# Patient Record
Sex: FEMALE | Race: WHITE | NOT HISPANIC OR LATINO | Employment: OTHER | URBAN - METROPOLITAN AREA
[De-identification: names, ages, dates, MRNs, and addresses within clinical notes are randomized per-mention and may not be internally consistent; named-entity substitution may affect disease eponyms.]

---

## 2018-02-05 ENCOUNTER — CONVERSION ENCOUNTER (OUTPATIENT)
Dept: MAMMOGRAPHY | Facility: HOSPITAL | Age: 63
End: 2018-02-05

## 2019-01-17 ENCOUNTER — OFFICE VISIT CONVERTED (OUTPATIENT)
Dept: FAMILY MEDICINE CLINIC | Facility: CLINIC | Age: 64
End: 2019-01-17
Attending: NURSE PRACTITIONER

## 2019-02-20 ENCOUNTER — HOSPITAL ENCOUNTER (OUTPATIENT)
Dept: GASTROENTEROLOGY | Facility: HOSPITAL | Age: 64
Setting detail: HOSPITAL OUTPATIENT SURGERY
Discharge: HOME | End: 2019-02-20
Attending: INTERNAL MEDICINE

## 2019-07-17 ENCOUNTER — OFFICE VISIT CONVERTED (OUTPATIENT)
Dept: FAMILY MEDICINE CLINIC | Facility: CLINIC | Age: 64
End: 2019-07-17
Attending: NURSE PRACTITIONER

## 2019-10-17 ENCOUNTER — OFFICE VISIT CONVERTED (OUTPATIENT)
Dept: FAMILY MEDICINE CLINIC | Facility: CLINIC | Age: 64
End: 2019-10-17
Attending: NURSE PRACTITIONER

## 2019-10-17 ENCOUNTER — HOSPITAL ENCOUNTER (OUTPATIENT)
Dept: FAMILY MEDICINE CLINIC | Facility: CLINIC | Age: 64
Discharge: HOME | End: 2019-10-17
Attending: NURSE PRACTITIONER

## 2019-10-17 LAB
FOLATE SERPL-MCNC: >20 NG/ML (ref 4.8–20)
IRON SATN MFR SERPL: 17 % (ref 20–55)
IRON SERPL-MCNC: 68 UG/DL (ref 60–170)
TIBC SERPL-MCNC: 396 UG/DL (ref 245–450)
TRANSFERRIN SERPL-MCNC: 277 MG/DL (ref 250–380)
VIT B12 SERPL-MCNC: 419 PG/ML (ref 211–911)

## 2019-12-13 ENCOUNTER — OFFICE VISIT CONVERTED (OUTPATIENT)
Dept: FAMILY MEDICINE CLINIC | Facility: CLINIC | Age: 64
End: 2019-12-13
Attending: NURSE PRACTITIONER

## 2019-12-16 ENCOUNTER — HOSPITAL ENCOUNTER (OUTPATIENT)
Dept: GENERAL RADIOLOGY | Facility: HOSPITAL | Age: 64
Discharge: HOME | End: 2019-12-16
Attending: NURSE PRACTITIONER

## 2019-12-27 ENCOUNTER — OFFICE VISIT CONVERTED (OUTPATIENT)
Dept: ORTHOPEDIC SURGERY | Facility: CLINIC | Age: 64
End: 2019-12-27
Attending: ORTHOPAEDIC SURGERY

## 2019-12-31 ENCOUNTER — HOSPITAL ENCOUNTER (OUTPATIENT)
Dept: MRI IMAGING | Facility: HOSPITAL | Age: 64
Discharge: HOME | End: 2019-12-31
Attending: ORTHOPAEDIC SURGERY

## 2020-01-09 ENCOUNTER — CONVERSION ENCOUNTER (OUTPATIENT)
Dept: ORTHOPEDIC SURGERY | Facility: CLINIC | Age: 65
End: 2020-01-09

## 2020-01-09 ENCOUNTER — OFFICE VISIT CONVERTED (OUTPATIENT)
Dept: ORTHOPEDIC SURGERY | Facility: CLINIC | Age: 65
End: 2020-01-09
Attending: ORTHOPAEDIC SURGERY

## 2020-01-15 ENCOUNTER — OFFICE VISIT CONVERTED (OUTPATIENT)
Dept: FAMILY MEDICINE CLINIC | Facility: CLINIC | Age: 65
End: 2020-01-15
Attending: NURSE PRACTITIONER

## 2020-02-11 ENCOUNTER — CONVERSION ENCOUNTER (OUTPATIENT)
Dept: GASTROENTEROLOGY | Facility: CLINIC | Age: 65
End: 2020-02-11

## 2020-02-11 ENCOUNTER — OFFICE VISIT CONVERTED (OUTPATIENT)
Dept: GASTROENTEROLOGY | Facility: CLINIC | Age: 65
End: 2020-02-11
Attending: NURSE PRACTITIONER

## 2020-03-13 ENCOUNTER — HOSPITAL ENCOUNTER (OUTPATIENT)
Dept: GASTROENTEROLOGY | Facility: HOSPITAL | Age: 65
Setting detail: HOSPITAL OUTPATIENT SURGERY
Discharge: HOME | End: 2020-03-13
Attending: INTERNAL MEDICINE

## 2020-04-10 ENCOUNTER — OFFICE VISIT CONVERTED (OUTPATIENT)
Dept: ORTHOPEDIC SURGERY | Facility: CLINIC | Age: 65
End: 2020-04-10
Attending: ORTHOPAEDIC SURGERY

## 2020-04-15 ENCOUNTER — TELEMEDICINE CONVERTED (OUTPATIENT)
Dept: FAMILY MEDICINE CLINIC | Facility: CLINIC | Age: 65
End: 2020-04-15
Attending: NURSE PRACTITIONER

## 2020-05-07 ENCOUNTER — TELEMEDICINE CONVERTED (OUTPATIENT)
Dept: GASTROENTEROLOGY | Facility: CLINIC | Age: 65
End: 2020-05-07
Attending: NURSE PRACTITIONER

## 2020-05-19 ENCOUNTER — HOSPITAL ENCOUNTER (OUTPATIENT)
Dept: LAB | Facility: HOSPITAL | Age: 65
Discharge: HOME | End: 2020-05-19
Attending: NURSE PRACTITIONER

## 2020-05-19 LAB
25(OH)D3 SERPL-MCNC: 31.1 NG/ML (ref 30–100)
ALBUMIN SERPL-MCNC: 4 G/DL (ref 3.5–5)
ALBUMIN/GLOB SERPL: 1 {RATIO} (ref 1.4–2.6)
ALP SERPL-CCNC: 74 U/L (ref 43–160)
ALT SERPL-CCNC: 22 U/L (ref 10–40)
ANION GAP SERPL CALC-SCNC: 21 MMOL/L (ref 8–19)
AST SERPL-CCNC: 33 U/L (ref 15–50)
BASOPHILS # BLD AUTO: 0.03 10*3/UL (ref 0–0.2)
BASOPHILS NFR BLD AUTO: 0.6 % (ref 0–3)
BILIRUB SERPL-MCNC: 0.39 MG/DL (ref 0.2–1.3)
BUN SERPL-MCNC: 12 MG/DL (ref 5–25)
BUN/CREAT SERPL: 11 {RATIO} (ref 6–20)
CALCIUM SERPL-MCNC: 9.3 MG/DL (ref 8.7–10.4)
CHLORIDE SERPL-SCNC: 101 MMOL/L (ref 99–111)
CHOLEST SERPL-MCNC: 193 MG/DL (ref 107–200)
CHOLEST/HDLC SERPL: 2.5 {RATIO} (ref 3–6)
CONV ABS IMM GRAN: 0.01 10*3/UL (ref 0–0.2)
CONV CO2: 21 MMOL/L (ref 22–32)
CONV IMMATURE GRAN: 0.2 % (ref 0–1.8)
CONV TOTAL PROTEIN: 8 G/DL (ref 6.3–8.2)
CREAT UR-MCNC: 1.12 MG/DL (ref 0.5–0.9)
DEPRECATED RDW RBC AUTO: 47.5 FL (ref 36.4–46.3)
EOSINOPHIL # BLD AUTO: 0.05 10*3/UL (ref 0–0.7)
EOSINOPHIL # BLD AUTO: 1 % (ref 0–7)
ERYTHROCYTE [DISTWIDTH] IN BLOOD BY AUTOMATED COUNT: 14.5 % (ref 11.7–14.4)
FOLATE SERPL-MCNC: >20 NG/ML (ref 4.8–20)
GFR SERPLBLD BASED ON 1.73 SQ M-ARVRAT: 52 ML/MIN/{1.73_M2}
GLOBULIN UR ELPH-MCNC: 4 G/DL (ref 2–3.5)
GLUCOSE SERPL-MCNC: 102 MG/DL (ref 65–99)
HCT VFR BLD AUTO: 42 % (ref 37–47)
HDLC SERPL-MCNC: 77 MG/DL (ref 40–60)
HGB BLD-MCNC: 13.2 G/DL (ref 12–16)
IRON SATN MFR SERPL: 12 % (ref 20–55)
IRON SERPL-MCNC: 59 UG/DL (ref 60–170)
LDLC SERPL CALC-MCNC: 95 MG/DL (ref 70–100)
LYMPHOCYTES # BLD AUTO: 1.82 10*3/UL (ref 1–5)
LYMPHOCYTES NFR BLD AUTO: 35.2 % (ref 20–45)
MCH RBC QN AUTO: 27.8 PG (ref 27–31)
MCHC RBC AUTO-ENTMCNC: 31.4 G/DL (ref 33–37)
MCV RBC AUTO: 88.6 FL (ref 81–99)
MONOCYTES # BLD AUTO: 0.55 10*3/UL (ref 0.2–1.2)
MONOCYTES NFR BLD AUTO: 10.6 % (ref 3–10)
NEUTROPHILS # BLD AUTO: 2.71 10*3/UL (ref 2–8)
NEUTROPHILS NFR BLD AUTO: 52.4 % (ref 30–85)
NRBC CBCN: 0 % (ref 0–0.7)
OSMOLALITY SERPL CALC.SUM OF ELEC: 286 MOSM/KG (ref 273–304)
PLATELET # BLD AUTO: 332 10*3/UL (ref 130–400)
PMV BLD AUTO: 9.9 FL (ref 9.4–12.3)
POTASSIUM SERPL-SCNC: 4.7 MMOL/L (ref 3.5–5.3)
RBC # BLD AUTO: 4.74 10*6/UL (ref 4.2–5.4)
SODIUM SERPL-SCNC: 138 MMOL/L (ref 135–147)
TIBC SERPL-MCNC: 480 UG/DL (ref 245–450)
TRANSFERRIN SERPL-MCNC: 336 MG/DL (ref 250–380)
TRIGL SERPL-MCNC: 104 MG/DL (ref 40–150)
VIT B12 SERPL-MCNC: 458 PG/ML (ref 211–911)
VLDLC SERPL-MCNC: 21 MG/DL (ref 5–37)
WBC # BLD AUTO: 5.17 10*3/UL (ref 4.8–10.8)

## 2020-07-14 ENCOUNTER — OFFICE VISIT CONVERTED (OUTPATIENT)
Dept: ORTHOPEDIC SURGERY | Facility: CLINIC | Age: 65
End: 2020-07-14
Attending: ORTHOPAEDIC SURGERY

## 2020-07-15 ENCOUNTER — TELEMEDICINE CONVERTED (OUTPATIENT)
Dept: FAMILY MEDICINE CLINIC | Facility: CLINIC | Age: 65
End: 2020-07-15
Attending: NURSE PRACTITIONER

## 2020-09-18 ENCOUNTER — HOSPITAL ENCOUNTER (OUTPATIENT)
Dept: LAB | Facility: HOSPITAL | Age: 65
Discharge: HOME | End: 2020-09-18
Attending: NURSE PRACTITIONER

## 2020-09-19 LAB
25(OH)D3 SERPL-MCNC: 39.1 NG/ML (ref 30–100)
IRON SATN MFR SERPL: 17 % (ref 20–55)
IRON SERPL-MCNC: 82 UG/DL (ref 60–170)
TIBC SERPL-MCNC: 470 UG/DL (ref 245–450)
TRANSFERRIN SERPL-MCNC: 329 MG/DL (ref 250–380)

## 2020-09-22 LAB
CONV CELIAC DISEASE AB-IGA: 79 MG/DL (ref 68–408)
TTG IGA SER-ACNC: <2 U/ML (ref 0–3)

## 2020-10-05 ENCOUNTER — OFFICE VISIT CONVERTED (OUTPATIENT)
Dept: FAMILY MEDICINE CLINIC | Facility: CLINIC | Age: 65
End: 2020-10-05
Attending: NURSE PRACTITIONER

## 2020-10-06 ENCOUNTER — OFFICE VISIT CONVERTED (OUTPATIENT)
Dept: ORTHOPEDIC SURGERY | Facility: CLINIC | Age: 65
End: 2020-10-06
Attending: ORTHOPAEDIC SURGERY

## 2020-10-26 ENCOUNTER — HOSPITAL ENCOUNTER (OUTPATIENT)
Dept: PREADMISSION TESTING | Facility: HOSPITAL | Age: 65
Discharge: HOME | End: 2020-10-26
Attending: ORTHOPAEDIC SURGERY

## 2020-10-26 LAB
ALBUMIN SERPL-MCNC: 3.6 G/DL (ref 3.5–5)
ALBUMIN/GLOB SERPL: 0.8 {RATIO} (ref 1.4–2.6)
ALP SERPL-CCNC: 76 U/L (ref 43–160)
ALT SERPL-CCNC: 18 U/L (ref 10–40)
ANION GAP SERPL CALC-SCNC: 15 MMOL/L (ref 8–19)
APTT BLD: 22 S (ref 22.2–34.2)
AST SERPL-CCNC: 26 U/L (ref 15–50)
BASOPHILS # BLD AUTO: 0.07 10*3/UL (ref 0–0.2)
BASOPHILS NFR BLD AUTO: 1.1 % (ref 0–3)
BILIRUB SERPL-MCNC: 0.33 MG/DL (ref 0.2–1.3)
BUN SERPL-MCNC: 16 MG/DL (ref 5–25)
BUN/CREAT SERPL: 15 {RATIO} (ref 6–20)
CALCIUM SERPL-MCNC: 10.1 MG/DL (ref 8.7–10.4)
CHLORIDE SERPL-SCNC: 102 MMOL/L (ref 99–111)
CONV ABS IMM GRAN: 0.03 10*3/UL (ref 0–0.2)
CONV CO2: 22 MMOL/L (ref 22–32)
CONV IMMATURE GRAN: 0.5 % (ref 0–1.8)
CONV TOTAL PROTEIN: 7.9 G/DL (ref 6.3–8.2)
CREAT UR-MCNC: 1.04 MG/DL (ref 0.5–0.9)
DEPRECATED RDW RBC AUTO: 45.4 FL (ref 36.4–46.3)
EOSINOPHIL # BLD AUTO: 0.25 10*3/UL (ref 0–0.7)
EOSINOPHIL # BLD AUTO: 3.8 % (ref 0–7)
ERYTHROCYTE [DISTWIDTH] IN BLOOD BY AUTOMATED COUNT: 14.2 % (ref 11.7–14.4)
EST. AVERAGE GLUCOSE BLD GHB EST-MCNC: 108 MG/DL
GFR SERPLBLD BASED ON 1.73 SQ M-ARVRAT: 57 ML/MIN/{1.73_M2}
GLOBULIN UR ELPH-MCNC: 4.3 G/DL (ref 2–3.5)
GLUCOSE SERPL-MCNC: 90 MG/DL (ref 65–99)
HBA1C MFR BLD: 5.4 % (ref 3.5–5.7)
HCT VFR BLD AUTO: 38.8 % (ref 37–47)
HGB BLD-MCNC: 12.6 G/DL (ref 12–16)
INR PPP: 0.99 (ref 2–3)
LYMPHOCYTES # BLD AUTO: 1.79 10*3/UL (ref 1–5)
LYMPHOCYTES NFR BLD AUTO: 26.9 % (ref 20–45)
MCH RBC QN AUTO: 28.2 PG (ref 27–31)
MCHC RBC AUTO-ENTMCNC: 32.5 G/DL (ref 33–37)
MCV RBC AUTO: 86.8 FL (ref 81–99)
MONOCYTES # BLD AUTO: 0.66 10*3/UL (ref 0.2–1.2)
MONOCYTES NFR BLD AUTO: 9.9 % (ref 3–10)
NEUTROPHILS # BLD AUTO: 3.85 10*3/UL (ref 2–8)
NEUTROPHILS NFR BLD AUTO: 57.8 % (ref 30–85)
NRBC CBCN: 0 % (ref 0–0.7)
OSMOLALITY SERPL CALC.SUM OF ELEC: 279 MOSM/KG (ref 273–304)
PLATELET # BLD AUTO: 284 10*3/UL (ref 130–400)
PMV BLD AUTO: 9.1 FL (ref 9.4–12.3)
POTASSIUM SERPL-SCNC: 4.7 MMOL/L (ref 3.5–5.3)
PROTHROMBIN TIME: 10.7 S (ref 9.4–12)
RBC # BLD AUTO: 4.47 10*6/UL (ref 4.2–5.4)
SODIUM SERPL-SCNC: 134 MMOL/L (ref 135–147)
WBC # BLD AUTO: 6.65 10*3/UL (ref 4.8–10.8)

## 2020-10-30 ENCOUNTER — HOSPITAL ENCOUNTER (OUTPATIENT)
Dept: PREADMISSION TESTING | Facility: HOSPITAL | Age: 65
Discharge: HOME | End: 2020-10-30
Attending: ORTHOPAEDIC SURGERY

## 2020-11-01 LAB — SARS-COV-2 RNA SPEC QL NAA+PROBE: NOT DETECTED

## 2020-11-11 ENCOUNTER — CONVERSION ENCOUNTER (OUTPATIENT)
Dept: FAMILY MEDICINE CLINIC | Facility: CLINIC | Age: 65
End: 2020-11-11

## 2020-11-11 ENCOUNTER — OFFICE VISIT CONVERTED (OUTPATIENT)
Dept: FAMILY MEDICINE CLINIC | Facility: CLINIC | Age: 65
End: 2020-11-11
Attending: NURSE PRACTITIONER

## 2020-11-19 ENCOUNTER — CONVERSION ENCOUNTER (OUTPATIENT)
Dept: ORTHOPEDIC SURGERY | Facility: CLINIC | Age: 65
End: 2020-11-19

## 2020-11-19 ENCOUNTER — OFFICE VISIT CONVERTED (OUTPATIENT)
Dept: ORTHOPEDIC SURGERY | Facility: CLINIC | Age: 65
End: 2020-11-19
Attending: ORTHOPAEDIC SURGERY

## 2020-12-21 ENCOUNTER — OFFICE VISIT CONVERTED (OUTPATIENT)
Dept: ORTHOPEDIC SURGERY | Facility: CLINIC | Age: 65
End: 2020-12-21
Attending: PHYSICIAN ASSISTANT

## 2021-01-04 ENCOUNTER — OFFICE VISIT CONVERTED (OUTPATIENT)
Dept: FAMILY MEDICINE CLINIC | Facility: CLINIC | Age: 66
End: 2021-01-04
Attending: NURSE PRACTITIONER

## 2021-02-01 ENCOUNTER — OFFICE VISIT CONVERTED (OUTPATIENT)
Dept: ORTHOPEDIC SURGERY | Facility: CLINIC | Age: 66
End: 2021-02-01
Attending: PHYSICIAN ASSISTANT

## 2021-03-10 ENCOUNTER — HOSPITAL ENCOUNTER (OUTPATIENT)
Dept: VACCINE CLINIC | Facility: HOSPITAL | Age: 66
Discharge: HOME | End: 2021-03-10
Attending: INTERNAL MEDICINE

## 2021-03-15 ENCOUNTER — HOSPITAL ENCOUNTER (OUTPATIENT)
Dept: LAB | Facility: HOSPITAL | Age: 66
Discharge: HOME | End: 2021-03-15
Attending: NURSE PRACTITIONER

## 2021-03-15 LAB
ALBUMIN SERPL-MCNC: 3.6 G/DL (ref 3.5–5)
ALBUMIN/GLOB SERPL: 0.8 {RATIO} (ref 1.4–2.6)
ALP SERPL-CCNC: 70 U/L (ref 43–160)
ALT SERPL-CCNC: 15 U/L (ref 10–40)
ANION GAP SERPL CALC-SCNC: 12 MMOL/L (ref 8–19)
AST SERPL-CCNC: 25 U/L (ref 15–50)
BASOPHILS # BLD AUTO: 0.05 10*3/UL (ref 0–0.2)
BASOPHILS NFR BLD AUTO: 0.9 % (ref 0–3)
BILIRUB SERPL-MCNC: 0.42 MG/DL (ref 0.2–1.3)
BUN SERPL-MCNC: 15 MG/DL (ref 5–25)
BUN/CREAT SERPL: 17 {RATIO} (ref 6–20)
CALCIUM SERPL-MCNC: 9.1 MG/DL (ref 8.7–10.4)
CHLORIDE SERPL-SCNC: 101 MMOL/L (ref 99–111)
CHOLEST SERPL-MCNC: 167 MG/DL (ref 107–200)
CHOLEST/HDLC SERPL: 2.7 {RATIO} (ref 3–6)
CONV ABS IMM GRAN: 0.01 10*3/UL (ref 0–0.2)
CONV CO2: 25 MMOL/L (ref 22–32)
CONV IMMATURE GRAN: 0.2 % (ref 0–1.8)
CONV TOTAL PROTEIN: 7.9 G/DL (ref 6.3–8.2)
CREAT UR-MCNC: 0.9 MG/DL (ref 0.5–0.9)
DEPRECATED RDW RBC AUTO: 51.3 FL (ref 36.4–46.3)
EOSINOPHIL # BLD AUTO: 0.14 10*3/UL (ref 0–0.7)
EOSINOPHIL # BLD AUTO: 2.4 % (ref 0–7)
ERYTHROCYTE [DISTWIDTH] IN BLOOD BY AUTOMATED COUNT: 16.1 % (ref 11.7–14.4)
FOLATE SERPL-MCNC: >20 NG/ML (ref 4.8–20)
GFR SERPLBLD BASED ON 1.73 SQ M-ARVRAT: >60 ML/MIN/{1.73_M2}
GLOBULIN UR ELPH-MCNC: 4.3 G/DL (ref 2–3.5)
GLUCOSE SERPL-MCNC: 86 MG/DL (ref 65–99)
HCT VFR BLD AUTO: 38.7 % (ref 37–47)
HDLC SERPL-MCNC: 63 MG/DL (ref 40–60)
HGB BLD-MCNC: 11.9 G/DL (ref 12–16)
IRON SATN MFR SERPL: 20 % (ref 20–55)
IRON SERPL-MCNC: 80 UG/DL (ref 60–170)
LDLC SERPL CALC-MCNC: 82 MG/DL (ref 70–100)
LYMPHOCYTES # BLD AUTO: 2.77 10*3/UL (ref 1–5)
LYMPHOCYTES NFR BLD AUTO: 47.4 % (ref 20–45)
MCH RBC QN AUTO: 26.6 PG (ref 27–31)
MCHC RBC AUTO-ENTMCNC: 30.7 G/DL (ref 33–37)
MCV RBC AUTO: 86.6 FL (ref 81–99)
MONOCYTES # BLD AUTO: 0.45 10*3/UL (ref 0.2–1.2)
MONOCYTES NFR BLD AUTO: 7.7 % (ref 3–10)
NEUTROPHILS # BLD AUTO: 2.42 10*3/UL (ref 2–8)
NEUTROPHILS NFR BLD AUTO: 41.4 % (ref 30–85)
NRBC CBCN: 0 % (ref 0–0.7)
OSMOLALITY SERPL CALC.SUM OF ELEC: 278 MOSM/KG (ref 273–304)
PLATELET # BLD AUTO: 316 10*3/UL (ref 130–400)
PMV BLD AUTO: 10.4 FL (ref 9.4–12.3)
POTASSIUM SERPL-SCNC: 4.2 MMOL/L (ref 3.5–5.3)
RBC # BLD AUTO: 4.47 10*6/UL (ref 4.2–5.4)
SODIUM SERPL-SCNC: 134 MMOL/L (ref 135–147)
TIBC SERPL-MCNC: 406 UG/DL (ref 245–450)
TRANSFERRIN SERPL-MCNC: 284 MG/DL (ref 250–380)
TRIGL SERPL-MCNC: 108 MG/DL (ref 40–150)
TSH SERPL-ACNC: 3.42 M[IU]/L (ref 0.27–4.2)
VIT B12 SERPL-MCNC: >2000 PG/ML (ref 211–911)
VLDLC SERPL-MCNC: 22 MG/DL (ref 5–37)
WBC # BLD AUTO: 5.84 10*3/UL (ref 4.8–10.8)

## 2021-03-31 ENCOUNTER — OFFICE VISIT CONVERTED (OUTPATIENT)
Dept: FAMILY MEDICINE CLINIC | Facility: CLINIC | Age: 66
End: 2021-03-31
Attending: NURSE PRACTITIONER

## 2021-03-31 ENCOUNTER — HOSPITAL ENCOUNTER (OUTPATIENT)
Dept: VACCINE CLINIC | Facility: HOSPITAL | Age: 66
Discharge: HOME | End: 2021-03-31
Attending: INTERNAL MEDICINE

## 2021-05-03 ENCOUNTER — CONVERSION ENCOUNTER (OUTPATIENT)
Dept: OTHER | Facility: HOSPITAL | Age: 66
End: 2021-05-03

## 2021-05-03 ENCOUNTER — OFFICE VISIT CONVERTED (OUTPATIENT)
Dept: ORTHOPEDIC SURGERY | Facility: CLINIC | Age: 66
End: 2021-05-03
Attending: PHYSICIAN ASSISTANT

## 2021-05-12 NOTE — PROGRESS NOTES
Progress Note      Patient Name: Jeimy Willard   Patient ID: [id number]   Sex: Female   YOB: 1955    Primary Care Provider: Jeaneth HOLLINS   Referring Provider: Jeaneth HOLLINS    Visit Date: April 10, 2020    Provider: Chuck Valdez MD   Location: [facility]   Location Address: [address] [place] [address]  [id number]   Location Phone: [phone]          Chief Complaint  · Left knee pain      History Of Present Illness  Jeimy Willard is a 65 year old /White female who presents today to [facility]. She presents today for evaluation of left knee pain. She had a previous steroid injection about 3 months ago. The injection helped her significantly about 2 months. However, last month she did notice increasing pain to the knee. No new injury or symptoms. She is requesting a Zilretta today. She had a trip planned out west in 3 weeks that has been cancelled.       Past Medical History  Anemia; Arthritis; Bone Density Screening; Cancer; GERD (gastroesophageal reflux disease); Hypertension; Insomnia; Lymphoma, Malignant; Osteoporosis; Pap smear for cervical cancer screening; Screening Mammogram; Seasonal allergies; Stomach Disorder; Stomach Neoplasm, Malignant; Ulcer; Vitamin D deficiency         Past Surgical History  Cholecystecomy; Colonoscopy; EGD; Gallbladder; Gastrectomy, laparoscopic         Medication List  acetaminophen 325 mg oral tablet; Ambien 5 mg oral tablet; celecoxib 200 mg oral capsule; cyanocobalamin (vitamin B-12) 1,000 mcg/mL injection solution; ferrous sulfate 325 mg (65 mg iron) oral tablet; folic acid 1 mg oral tablet; lisinopril 10 mg oral tablet; loratadine 10 mg oral tablet; Miracle Cream 0; olopatadine 0.1 % ophthalmic (eye) drops; pantoprazole 40 mg oral tablet,delayed release (DR/EC); Premarin 0.625 mg/gram vaginal cream; PreserVision AREDS 7,160-113-100 unit-mg-unit oral tablet; Tums oral; Vitamin B-6 oral; Vitamin D3 1,000 unit oral  "tablet; Zantac 150 mg oral tablet         Allergy List  NO KNOWN DRUG ALLERGIES         Family Medical History  Breast Neoplasm, Malignant; Lung Neoplasm, Malignant; Stroke; Heart Disease; Hypertension; Cancer, Unspecified; Diabetes, unspecified type; Alzheimer's Disease; - No Family History of Colorectal Cancer; Renal failure; Diabetes; Esophagus Neoplasm, Malignant; Family history of Arthritis         Social History  Alcohol (Never); Alcohol Use (Never); lives with spouse; .; Recreational Drug Use (Never); Retired.; Tobacco (Never)         Review of Systems  · Constitutional  o Denies  o : fever, chills, weight loss  · Cardiovascular  o Denies  o : chest pain, shortness of breath  · Gastrointestinal  o Denies  o : liver disease, heartburn, nausea, blood in stools  · Genitourinary  o Denies  o : painful urination, blood in urine  · Integument  o Denies  o : rash, itching  · Neurologic  o Denies  o : headache, weakness, loss of consciousness  · Musculoskeletal  o Denies  o : painful, swollen joints  · Psychiatric  o Denies  o : drug/alcohol addiction, anxiety, depression      Vitals  Date Time BP Position Site L\R Cuff Size HR RR TEMP (F) WT  HT  BMI kg/m2 BSA m2 O2 Sat        04/10/2020 08:28 AM         205lbs 16oz 5'  3\" 36.49 2.04           Physical Examination  · Constitutional  o Appearance  o : well developed, well-nourished, no obvious deformities present  · Head and Face  o Head  o :   § Inspection  § : normocephalic  o Face  o :   § Inspection  § : no facial lesions  · Eyes  o Conjunctivae  o : conjunctivae normal  o Sclerae  o : sclerae white  · Ears, Nose, Mouth and Throat  o Ears  o :   § External Ears  § : appearance within normal limits  § Hearing  § : intact  o Nose  o :   § External Nose  § : appearance normal  · Neck  o Inspection/Palpation  o : normal appearance  o Range of Motion  o : full range of motion  · Respiratory  o Respiratory Effort  o : breathing unlabored  o Inspection of " Chest  o : normal appearance  o Auscultation of Lungs  o : no audible wheezing or rales  · Cardiovascular  o Heart  o : regular rate  · Gastrointestinal  o Abdominal Examination  o : soft and non-tender  · Skin and Subcutaneous Tissue  o General Inspection  o : intact, no rashes  · Psychiatric  o General  o : Alert and oriented x3  o Judgement and Insight  o : judgment and insight intact  o Mood and Affect  o : mood normal, affect appropriate  · Left Knee  o Inspection  o : Positive crepitus, sensation intact, Neurovascularly intact, flexion 110 degrees to full extension, skin intact, no skin discoloration, stable to varus and valgus stress, stable to anterior and posterior drawer, negative Alejandra, mild medial joint line tenderness, tender lateral joint line, Patella is stable, pain with patella compression  · Injection Note/Aspiration Note  o Site  o : left knee  o Procedure  o : Procedure: After educating the patient, patient gave consent for procedure. After using Chloraprep, the joint space was injected. The patient tolerated the procedure well.  o Medication  o : 32mg of SAMPLE Zilretta with 5ccs of 1% Lidocaine          Assessment  · Primary osteoarthritis of left knee     715.16/M17.12  · Left knee pain, unspecified chronicity     719.46/M25.562      Plan  · Orders  o Zilretta- 1 mg. () () - 715.16/M17.12 - 04/10/2020   Lot [id number] Exp 11 2020 Flexion Administered by CELI PIPER MD  o Knee Intra-articular Injection without US [facility] (33875) - 715.16/M17.12 - 04/10/2020   Lot [id number] Exp 07 01 2021 Hospira Administered by CELI PIPER MD  · Medications  o Medications have been Reconciled  o Transition of Care or Provider Policy  · Instructions  o Reviewed the patient's Past Medical, Social, and Family history as well as the ROS at today's visit, no changes.  o Call or return if worsening symptoms.  o This note is transcribed by Radha hernández  o Discussed treatment option with  the patient. She wishes to pursue left knee Zilretta injection today. Risks and benefits were discussed. She tolerated the injection well. Follow-up in 3 months to recheck.             Electronically Signed by: Radha Wilcox, -Author on April 13, 2020 12:02:55 PM  Electronically Co-signed by: Chuck Valdez MD -Reviewer on April 13, 2020 08:48:25 PM

## 2021-05-12 NOTE — PROGRESS NOTES
Progress Note      Patient Name: Jeimy Willard   Patient ID: [id number]   Sex: Female   YOB: 1955    Primary Care Provider: Jeaneth HOLLINS   Referring Provider: Jeaneth HOLLINS    Visit Date: April 15, 2020    Provider: GUNJAN Malloy   Location: [facility]   Location Address: [address] [place] [address]  [id number]   Location Phone: [phone]          Chief Complaint  · 3 Month Follow up for HTN, Insomnia, GERD, Anemia, and Vitamin D Deficiency      History Of Present Illness  Video Conferencing Visit  Jeimy Willard is a 65 year old /White female who is presenting for evaluation via video conferencing. Verbal consent obtained before beginning visit.   The following staff were present during this visit: Sofiya Nash CMA   Jeimy Willard is a 65 year old /White female who presents for evaluation and treatment of:      3 Month Follow up for HTN, Insomnia, GERD, Anemia, and Vitamin D Deficiency  Last lab work done 7/2019  Med Refills needed of ambien and acetaminophen.    Pt reported she took ferrous sulfate for a few days but caused nausea so stopped taking.    Also needing new referral to /[facility] for injections in her Lt Knee arthritis. Last injection on Monday.    uds 1.20. Insomnia - pt is sleeping 7-8 hours and awakens rested. denies any med se.          flu shot- 9/2019  Pap-10/2018  Dexa- 2/2018  Mammo- 2019 [facility]  Colon- 2/2019       Past Medical History  Disease Name Date Onset Notes   Anemia --  --    Arthritis --  --    Bone Density Screening 2/5/18 --    Cancer --  --    GERD (gastroesophageal reflux disease) --  --    Hypertension --  --    Insomnia --  --    Lymphoma, Malignant 1999 --    Osteoporosis --  --    Pap smear for cervical cancer screening 10/2018 --    Screening Mammogram 2019 [facility]   Seasonal allergies --  --    Stomach Disorder --  --    Stomach Neoplasm, Malignant 2005 --    Ulcer --  --     Vitamin D deficiency --  --          Past Surgical History  Procedure Name Date Notes   Cholecystecomy --  --    Colonoscopy 2/20/19 02/20/2019 - Polyp (6mm) in ascending colon, Polyp (2mm) in sigmoid colon - Polypectomy, Mild diverticulosis of sigmoid colon, Grade 1 internal hemorrhoids.    EGD 03/13/2020 Normal mucosa in the whole esophagus, Nodular tissue visualized at the esophago-jejunal anastomosis, Afferent and efferent small bowel limbs visualized. Patchy erythema and single erosion visualized in one jejunal limb. No gastric tissue visualized.   Gallbladder --  --    Gastrectomy, laparoscopic --  --          Medication List  Name Date Started Instructions   acetaminophen 325 mg oral tablet 10/17/2019 take 1 - 2 tablets (325 - 650 mg) by oral route every 4-6 hours as needed for 90 days   Ambien 5 mg oral tablet 01/15/2020 take 1 tablet (5 mg) by oral route once daily at bedtime for 90 days   celecoxib 200 mg oral capsule 01/15/2020 take 1 capsule (200 mg) by oral route once daily for 90 days   cyanocobalamin (vitamin B-12) 1,000 mcg/mL injection solution 01/15/2020 inject 1 milliliter (1,000 mcg) by intramuscular route once a month   ferrous sulfate 325 mg (65 mg iron) oral tablet 01/15/2020 take 1 tablet (325 mg) by oral route once daily for 90 days   folic acid 1 mg oral tablet 01/15/2020 take 1 tablet (1 mg) by oral route once daily for 90 days   lisinopril 10 mg oral tablet 01/15/2020 take 1 tablet (10 mg) by oral route once daily for 90 days   loratadine 10 mg oral tablet 01/15/2020 take 1 tablet (10 mg) by oral route once daily for 90 days   Miracle Cream 0 01/15/2020 clotrimazole/zinc/hydrocortisone apply to affected area tid   olopatadine 0.1 % ophthalmic (eye) drops  instill 1 drop into affected eye(s) by ophthalmic route 2 times per day at an interval of 6 to 8 hours   pantoprazole 40 mg oral tablet,delayed release (DR/EC) 01/24/2020 take 1 tablet (40 mg) by oral route once daily in the morning    Premarin 0.625 mg/gram vaginal cream  --    PreserVision AREDS 7,160-113-100 unit-mg-unit oral tablet  take 1 tablet by oral route 2 times a day   Tums oral  --    Vitamin B-6 oral  --    Vitamin D3 1,000 unit oral tablet  take 1 tablet by oral route daily   Zantac 150 mg oral tablet 01/15/2020 take 1 tablet (150 mg) by oral route 2 times per day for 90 days         Allergy List  Allergen Name Date Reaction Notes   NO KNOWN DRUG ALLERGIES --  --  --          Family Medical History  Disease Name Relative/Age Notes   Breast Neoplasm, Malignant Mother/   --    Lung Neoplasm, Malignant Grandmother (maternal)/   --    Stroke Mother/   Mother   Heart Disease Brother/  Mother/   Mother; Brother   Hypertension Brother/  Mother/   --    Cancer, Unspecified Brother/  Mother/   Mother; Brother   Diabetes, unspecified type Brother/  Father/  Mother/   Mother; Father; Brother   Alzheimer's Disease Grandfather (maternal)/   --    - No Family History of Colorectal Cancer  --    Renal failure Mother/   --    Diabetes Brother/  Father/  Mother/   --    Esophagus Neoplasm, Malignant Grandmother (paternal)/   --    Family history of Arthritis Mother/   Mother         Social History  Finding Status Start/Stop Quantity Notes   Alcohol Never --/-- --  --    Alcohol Use Never --/-- --  does not drink   lives with spouse --  --/-- --  --    . --  --/-- --  --    Recreational Drug Use Never --/-- --  no   Retired. --  --/-- --  --    Tobacco Never --/-- --  never smoker         Immunizations  NameDate Admin Mfg Trade Name Lot Number Route Inj VIS Given VIS Publication   Eznlxecit79/17/2019 NE Not Entered  NE NE     Comments: [facility]         Review of Systems  · Constitutional  o Denies  o : fatigue, fever, weight gain, weight loss, chills  · Cardiovascular  o Denies  o : chest Pain, palpitations, edema (swelling)  · Respiratory  o Denies  o : frequent cough, shortness of breath  · Gastrointestinal  o Denies  o : nausea,  vomiting, changes in bowel habits  · Genitourinary  o Denies  o : dysuria, urinary frequency, urinary urgency, polyuria  · Neurologic  o Denies  o : headache, tingling or numbness, dizziness  · Musculoskeletal  o Admits  o : knee pain  o Denies  o : joint pain, myalgias  · Endocrine  o Denies  o : polydipsia, polyphagia  · Psychiatric  o Admits  o : difficulty sleeping  o Denies  o : mood changes, memory changes, SI/HI          Assessment  · Anemia     285.9/D64.9  · Essential hypertension     401.9/I10  · GERD (gastroesophageal reflux disease)     530.81/K21.9  · Insomnia, unspecified     780.52/G47.00  · Vitamin D deficiency     268.9/E55.9  · Arthritis of knee, left     716.96/M17.12      Plan  · Orders  o B12 Folate levels (B12FO) - 285.9/D64.9 - 04/15/2020  o CBC with Auto Diff [facility] (47841) - 285.9/D64.9 - 04/15/2020  o Iron panel (iron, TIBC, transferrin saturation) (91034, 66065, 89042) - 285.9/D64.9 - 04/15/2020  o HTN/Lipid Panel (CMP, Lipid) [facility] (38283, 47506) - 401.9/I10 - 04/15/2020  o Vitamin D Level (31619) - 268.9/E55.9 - 04/15/2020  o LEESA Report (KASPR) - - 04/15/2020  o ACO-39: Current medications updated and reviewed () - - 04/15/2020  o ORTHOPEDIC CONSULTATION (ORTHO) - 716.96/M17.12 - 04/15/2020  · Medications  o acetaminophen 325 mg oral tablet   SIG: take 1 - 2 tablets (325 - 650 mg) by oral route every 4-6 hours as needed for 90 days   DISP: (90) tablets with 1 refills  Adjusted on 04/15/2020     o Ambien 5 mg oral tablet   SIG: take 1 tablet (5 mg) by oral route once daily at bedtime for 90 days   DISP: (90) tablet with 0 refills  Adjusted on 04/15/2020     o Medications have been Reconciled  o Transition of Care or Provider Policy  · Instructions  o Patient advised to monitor blood pressure (B/P) at home and journal readings. Patient informed that a B/P reading at home of more than 130/80 is considered hypertension. For readings greater [id number]/90 or higher patient is advised to  follow up in the office with readings for management. Patient advised to limit sodium intake.  o Maintain a healthy weight. Avoid tight fitting clothes. Avoid fried, fatty foods, tomato sauce, chocolate, mint, garlic, onion, alcohol. caffeine. Eat smaller meals, dont lie down after a meal, dont smoke. Elevate the head of your bed 6-9 inches.  o Avoid any electronic use for at least 30 minutes prior to bed time. Cell phone screens, tablets and TVs imitate daylight, so your brain can become confused on the time of day. No caffeine use in the late afternoon and evenings.  o Obtained a written consent for LEESA query. Discussed the risk and benefits of the use of controlled substances with the patient, including the risk of tolerance and drug dependence. The patient has been counseled on the need to have an exit strategy, including potentially discontinuing the use of controlled substances. LEESA has or will be reviewed as soon as it becomes avaliable.  o Patient was educated/instructed on their diagnosis, treatment and medications prior to discharge from the clinic today.  o Call the office with any concerns or questions.  · Disposition  o Return to Office in 3 months.            Electronically Signed by: GUNJAN Malloy -Author on April 15, 2020 08:10:42 AM

## 2021-05-13 NOTE — PROGRESS NOTES
Progress Note      Patient Name: Jeimy Willard   Patient ID: [id number]   Sex: Female   YOB: 1955    Primary Care Provider: Jeaneth HOLLINS   Referring Provider: Jeaneth HOLLINS    Visit Date: November 11, 2020    Provider: GUNJAN Malloy   Location: [facility]   Location Address: [address] [place] [address]  [id number]   Location Phone: [phone]          Chief Complaint  · Follow up office visit within 7 calendar days of discharge from inpatient status (high complexity).      History Of Present Illness  Jeimy Willard is a 65 year old /White female who presents for evaluation and treatment of:   FOLLOW UP OFFICE VISIT WITHIN 7 CALENDAR DAYS OF INPATIENT STATUS (SEVERE COMPLEXITY)  Jeimy Willard presents to office for follow up post discharge from inpatient status within 7 calendar days. Patient was contacted within 2 business days via phone conversation. Documentation of that phone contact is present in the patient's electronic chart. Patient was admitted to an inpatient faciliity on 11/04/2020 and discharged on 11/05/2020 due to: Left Knee OA   Admitting MD: Dr. Moy Santiago/   Her discharge summary has been reviewed and placed in the patient's electronic chart.   Patient's problem list is: Anemia, GERD (gastroesophageal reflux disease), and Ulcer   Patient's outpatient medication list has been reconciled with the medication list from the discharge summary and has been reviewed with the patient. Current medication list is: acetaminophen 325 mg oral tablet, Ambien CR 6.25 mg oral tablet,ext release multiphase, celecoxib 200 mg oral capsule, cyanocobalamin (vitamin B-12) 1,000 mcg/mL injection solution, Eliquis 2.5 mg oral tablet, folic acid 1 mg oral tablet, hydrocodone-acetaminophen 7.5-325 mg oral tablet, lisinopril 10 mg oral tablet, Miracle Cream 0, Norco 7.5-325 mg oral tablet, olopatadine 0.1 % ophthalmic (eye)  drops, pantoprazole 40 mg oral tablet,delayed release (DR/EC), Premarin 0.625 mg/gram vaginal cream, PreserVision AREDS 7,160-113-100 unit-mg-unit oral tablet, Tums oral, Vitamin B-6 oral, Vitamin D3 125 mcg (5,000 unit) oral tablet, Vitron-C 65 mg iron- 125 mg oral tablet,delayed release (DR/EC), and Voltaren 1 % topical gel      Pt is her for In-Pt follow up from having Lt knee total arthroplasty by Dr. Valdez  Currently doing PT 3 times per week  Will follow up with Ortho on 11/19/20 to have staples removed.    Pt reports pain is improved.            Past Medical History  Disease Name Date Onset Notes   Anemia --  --    Arthritis --  --    Bone Density Screening 2/5/18 --    Cancer --  --    GERD (gastroesophageal reflux disease) --  --    Hypertension --  --    Insomnia --  --    Lymphoma, Malignant 1999 --    Osteoporosis --  --    Pap smear for cervical cancer screening 10/2018 --    Screening Mammogram 8/20/20 [place]- 2019   Seasonal allergies --  --    Stomach Disorder --  --    Stomach Neoplasm, Malignant 2005 --    Ulcer --  --    Vitamin D deficiency --  --          Past Surgical History  Procedure Name Date Notes   Cholecystecomy --  --    Colonoscopy 2/20/19 02/20/2019 - Polyp (6mm) in ascending colon, Polyp (2mm) in sigmoid colon - Polypectomy, Mild diverticulosis of sigmoid colon, Grade 1 internal hemorrhoids.    EGD 03/13/2020 Normal mucosa in the whole esophagus, Nodular tissue visualized at the esophago-jejunal anastomosis, Afferent and efferent small bowel limbs visualized. Patchy erythema and single erosion visualized in one jejunal limb. No gastric tissue visualized.   Gallbladder --  --    Gastrectomy, laparoscopic --  --          Medication List  Name Date Started Instructions   acetaminophen 325 mg oral tablet 10/05/2020 take 1 - 2 tablets (325 - 650 mg) by oral route every 4-6 hours as needed for 90 days   Ambien CR 6.25 mg oral tablet,ext release multiphase 10/05/2020 take 1 tablet  (6.25 mg) by oral route once daily at bedtime for 30 days   celecoxib 200 mg oral capsule 07/15/2020 take 1 capsule (200 mg) by oral route once daily for 90 days   cyanocobalamin (vitamin B-12) 1,000 mcg/mL injection solution 07/15/2020 inject 1 milliliter (1,000 mcg) by intramuscular route once a month   Eliquis 2.5 mg oral tablet  take 1 tablet (2.5 mg) by oral route 2 times per day for 14 days   folic acid 1 mg oral tablet 07/15/2020 take 1 tablet (1 mg) by oral route once daily for 90 days   hydrocodone-acetaminophen 7.5-325 mg oral tablet  take 1 tablet by oral route every 4-6 hours as needed for pain   lisinopril 10 mg oral tablet 07/15/2020 take 1 tablet (10 mg) by oral route once daily for 90 days   Miracle Cream 0 01/15/2020 clotrimazole/zinc/hydrocortisone apply to affected area tid   Norco 7.5-325 mg oral tablet 11/10/2020 take 1 tablet by oral route every 4 to 6 hours   olopatadine 0.1 % ophthalmic (eye) drops  instill 1 drop into affected eye(s) by ophthalmic route 2 times per day at an interval of 6 to 8 hours   pantoprazole 40 mg oral tablet,delayed release (DR/EC) 07/15/2020 take 1 tablet (40 mg) by oral route once daily in the morning   Premarin 0.625 mg/gram vaginal cream 07/15/2020 insert one-fourth applicatorful (0.5 gram) by vaginal route twice weekly   PreserVision AREDS 7,160-113-100 unit-mg-unit oral tablet  take 1 tablet by oral route 2 times a day   Tums oral  --    Vitamin B-6 oral  --    Vitamin D3 125 mcg (5,000 unit) oral tablet  take 1 tablet by oral route 2 times a day   Vitron-C 65 mg iron- 125 mg oral tablet,delayed release (DR/EC)  take 1 tablet by oral route daily   Voltaren 1 % topical gel 07/15/2020 apply 2 grmas to left knee by topical route QID         Allergy List  Allergen Name Date Reaction Notes   NO KNOWN DRUG ALLERGIES --  --  --          Family Medical History  Disease Name Relative/Age Notes   Breast Neoplasm, Malignant Mother/   --    Lung Neoplasm, Malignant  "Grandmother (maternal)/   --    Stroke Mother/   Mother   Heart Disease Brother/  Mother/   Mother; Brother   Hypertension Brother/  Mother/   --    Cancer, Unspecified Brother/  Mother/   Mother; Brother   Diabetes, unspecified type Brother/  Father/  Mother/   Mother; Father; Brother   Alzheimer's Disease Grandfather (maternal)/   --    - No Family History of Colorectal Cancer  --    Renal failure Mother/   --    Diabetes Brother/  Father/  Mother/   --    Esophagus Neoplasm, Malignant Grandmother (paternal)/   --    Family history of Arthritis Mother/   Mother         Social History  Finding Status Start/Stop Quantity Notes   Alcohol Never --/-- --  --    lives with spouse --  --/-- --  --    . --  --/-- --  --    Recreational Drug Use Never --/-- --  no   Retired. --  --/-- --  --    Tobacco Never --/-- --  never smoker         Immunizations  NameDate Admin Mfg Trade Name Lot Number Route Inj VIS Given VIS Publication   [id number] NE Not Entered  NE NE     Comments: Pt reported, administered at pharmacy         Review of Systems  · Constitutional  o Denies  o : fever, fatigue, weight loss, weight gain  · Cardiovascular  o Denies  o : lower extremity edema, claudication, chest pressure, palpitations  · Respiratory  o Denies  o : shortness of breath, wheezing, frequent cough, hemoptysis, dyspnea on exertion  · Gastrointestinal  o Denies  o : nausea, vomiting, diarrhea, constipation, abdominal pain  · Musculoskeletal  o Admits  o : knee pain  o Denies  o : joint pain, myalgias      Vitals  Date Time BP Position Site L\R Cuff Size HR RR TEMP (F) WT  HT  BMI kg/m2 BSA m2 O2 Sat FR L/min FiO2 HC       10/05/2020 07:33 /70 Sitting    82 - R 24 98.1 210lbs 0oz 5'  3\" 37.2 2.06 96 %      10/06/2020 08:29 AM         210lbs 4oz 5'  3\" 37.24 2.06       11/11/2020 02:04 /63 Sitting    100 - R 24 98.8  5'  3\"   97 %            Physical Examination  · Constitutional  o Appearance  o : " well-nourished, in no acute distress  · Eyes  o Conjunctivae  o : conjunctivae normal  o Sclerae  o : sclerae white  o Pupils and Irises  o : pupils equal and round  o Eyelids/Ocular Adnexae  o : eyelid appearance normal, no exudates present  · Respiratory  o Respiratory Effort  o : breathing unlabored  o Inspection of Chest  o : normal appearance  o Auscultation of Lungs  o : normal breath sounds throughout inspiration and expiration  · Cardiovascular  o Heart  o :   § Auscultation of Heart  § : regular rate and rhythm, no murmurs, gallops or rubs  o Peripheral Vascular System  o :   § Carotid Arteries  § : normal pulses bilaterally, no bruits present  § Extremities  § : mild lower extremity edema present-left lower extremity, no cyanosis, no distal hair loss, normal capillary refill  · Gastrointestinal  o Abdominal Examination  o : abdomen nontender to palpation, tone normal without rigidity or guarding, no masses present, bowel sounds present  · Musculoskeletal  o Left Lower Extremity  o :   § Inspection/Palpation  § : left knee surgical incision with staples will approximated over medial knee. mild erythema, no warmth or drainage. mild bruising and edema noted.   · Skin and Subcutaneous Tissue  o General Inspection  o : no rashes or lesions present, no areas of discoloration  o Body Hair  o : hair normal for age, general body hair distribution normal for age  o Digits and Nails  o : no clubbing, cyanosis, deformities or edema present, normal appearing nails  · Neurologic  o Mental Status Examination  o :   § Orientation  § : grossly oriented to person, place and time  o Gait and Station  o : normal gait, able to stand without difficulty  · Psychiatric  o Judgement and Insight  o : judgment and insight intact  o Mood and Affect  o : mood normal, affect appropriate     bends slightly greater than 90 degrees.               Assessment  · Knee osteoarthritis     715.36/M17.10  continue with PT and ortho. icing and  wear compression stockings.   · Knee pain, left     719.46/M25.562      Plan  · Orders  o Discharge medications reconciled with the current medication list (1111F) - - 11/11/2020  o ACO-39: Current medications updated and reviewed (, 1159F) - - 11/11/2020  · Instructions  o Patient was educated/instructed on their diagnosis, treatment and medications prior to discharge from the clinic today.  o Patient discharge summary has been reviewed and placed in patient's electronic medical record.  o Patient received a phone call from my office within 2 business days of discharge from inpatient status, and documented within the patient's chart.  o Also patient was seen (face to face) for follow up evaluation within 7 calendar days of discharge from inpatient status for a high complexity issue.  o Patient was educated on their diagnosis, treatment and any medication changes while being evaluated today.  · Disposition  o FOLLOW UP PRN            Electronically Signed by: GUNJAN Malloy -Author on November 11, 2020 02:36:21 PM

## 2021-05-13 NOTE — PROGRESS NOTES
Progress Note      Patient Name: Jeimy Willard   Patient ID: [id number]   Sex: Female   YOB: 1955    Primary Care Provider: Jeaneth HOLLINS   Referring Provider: Jeaneth HOLLINS    Visit Date: October 6, 2020    Provider: Chuck Valdez MD   Location: [facility]   Location Address: [address] [place] [address]  [id number]   Location Phone: [phone]          Chief Complaint  · Left knee pain      History Of Present Illness  Jeimy Willard is a 65 year old /White female who presents today to [facility].      The patient presents here today for follow up evaluation of left knee pain. The patient has had previous injections in the past for her left knee with minimal relief. She received a steroid injection in July 2020. The patient reports she has had knee pain in her left knee for several years. She reports her pain is getting worse. She does use Celebrex and voltaren gel for pain.            Past Medical History  Anemia; Arthritis; Bone Density Screening; Cancer; GERD (gastroesophageal reflux disease); Hypertension; Insomnia; Lymphoma, Malignant; Osteoporosis; Pap smear for cervical cancer screening; Screening Mammogram; Seasonal allergies; Stomach Disorder; Stomach Neoplasm, Malignant; Ulcer; Vitamin D deficiency         Past Surgical History  Cholecystecomy; Colonoscopy; EGD; Gallbladder; Gastrectomy, laparoscopic         Medication List  acetaminophen 325 mg oral tablet; Ambien CR 6.25 mg oral tablet,ext release multiphase; celecoxib 200 mg oral capsule; cyanocobalamin (vitamin B-12) 1,000 mcg/mL injection solution; folic acid 1 mg oral tablet; lisinopril 10 mg oral tablet; loratadine 10 mg oral tablet; Miracle Cream 0; Ocuvite Eye Health 50 mg-15 unit- 4.5 mg-2.5 mg oral tablet,chewable; olopatadine 0.1 % ophthalmic (eye) drops; pantoprazole 40 mg oral tablet,delayed release (DR/EC); Premarin 0.625 mg/gram vaginal cream; PreserVision AREDS  "7,160-113-100 unit-mg-unit oral tablet; Tums oral; Vitamin B-6 oral; Vitamin D3 1,000 unit oral tablet; Vitron-C 65 mg iron- 125 mg oral tablet,delayed release (DR/EC); Voltaren 1 % topical gel         Allergy List  NO KNOWN DRUG ALLERGIES       Allergies Reconciled  Family Medical History  Breast Neoplasm, Malignant; Lung Neoplasm, Malignant; Stroke; Heart Disease; Hypertension; Cancer, Unspecified; Diabetes, unspecified type; Alzheimer's Disease; - No Family History of Colorectal Cancer; Renal failure; Diabetes; Esophagus Neoplasm, Malignant; Family history of Arthritis         Social History  Alcohol (Never); lives with spouse; .; Recreational Drug Use (Never); Retired.; Tobacco (Never)         Review of Systems  · Constitutional  o Denies  o : fever, chills, weight loss  · Cardiovascular  o Denies  o : chest pain, shortness of breath  · Gastrointestinal  o Denies  o : liver disease, heartburn, nausea, blood in stools  · Genitourinary  o Denies  o : painful urination, blood in urine  · Integument  o Denies  o : rash, itching  · Neurologic  o Denies  o : headache, weakness, loss of consciousness  · Musculoskeletal  o Denies  o : painful, swollen joints  · Psychiatric  o Denies  o : drug/alcohol addiction, anxiety, depression      Vitals  Date Time BP Position Site L\R Cuff Size HR RR TEMP (F) WT  HT  BMI kg/m2 BSA m2 O2 Sat FR L/min FiO2 HC       10/06/2020 08:29 AM         210lbs 4oz 5'  3\" 37.24 2.06             Physical Examination  · Constitutional  o Appearance  o : well developed, well-nourished, no obvious deformities present  · Head and Face  o Head  o :   § Inspection  § : normocephalic  o Face  o :   § Inspection  § : no facial lesions  · Eyes  o Conjunctivae  o : conjunctivae normal  o Sclerae  o : sclerae white  · Ears, Nose, Mouth and Throat  o Ears  o :   § External Ears  § : appearance within normal limits  § Hearing  § : intact  o Nose  o :   § External Nose  § : appearance " normal  · Neck  o Inspection/Palpation  o : normal appearance  o Range of Motion  o : full range of motion  · Respiratory  o Respiratory Effort  o : breathing unlabored  o Inspection of Chest  o : normal appearance  o Auscultation of Lungs  o : no audible wheezing or rales  · Cardiovascular  o Heart  o : regular rate  · Gastrointestinal  o Abdominal Examination  o : soft and non-tender  · Skin and Subcutaneous Tissue  o General Inspection  o : intact, no rashes  · Psychiatric  o General  o : Alert and oriented x3  o Judgement and Insight  o : judgment and insight intact  o Mood and Affect  o : mood normal, affect appropriate  · Left Knee  o Inspection  o : 0 Extension; 15 valgus angulation. flexion 125. Positive crepitus. Neurovascularly intact. Stability intact. Stable to anterior and posterior drawer.   · In Office Procedures  o View  o : LAT/SUNRISE/STANDING  o Site  o : left knee  o Indication  o : left knee pain  o Study  o : X-rays ordered, taken in the office, and reviewed today.  o Xray  o : Advance moderate degenerative changes to the knee with valgus deformity. Tricompartmental osteophytes. No fracture.   o Comparative Data  o : No comparative data found          Assessment  · Primary osteoarthritis of left knee     715.16/M17.12  · Left knee pain, unspecified chronicity     719.46/M25.562      Plan  · Orders  o Knee (Left) [facility]erred View ([id number]) - 719.46/M25.562 - 10/06/2020  · Medications  o Medications have been Reconciled  o Transition of Care or Provider Policy  · Instructions  o Reviewed the patient's Past Medical, Social, and Family history as well as the ROS at today's visit, no changes.  o Call or return if worsening symptoms.  o Discussed surgery.  o Risks/benefits discussed with patient including, but not limited to: infection, bleeding, neurovascular damage, malunion, nonunion, aesthetic deformity, need for further surgery, and death.  o Discussed with patient the implant type being used  during surgery and patient understands and desires to proceed.  o Surgery pamphlet given.  o X-ray ordered, taken and reviewed at this visit.  o The above service was scribed by Charmaine Mg on my behalf and I attest to the accuracy of the note. jsb  o Discussed treatment options with the patient. We discussed the risks and benefits of a Left Total Knee Arthroplasty. Patient expressed understanding and wished to proceed with a Left Total Knee Arthroplasty. Her  works from home so he will be there to help her post operatively. Follow up 2 weeks post operatively.  o Electronically Identified Patient Education Materials Provided Electronically  · Referrals  o ID: [id number] Date: 07/14/2020 Type: Inbound  Specialty: Orthopedic Surgery            Electronically Signed by: Charmaine Mg MA -Author on October 6, 2020 09:06:41 AM  Electronically Co-signed by: Chuck Valdez MD -Reviewer on October 6, 2020 10:05:51 PM

## 2021-05-13 NOTE — PROGRESS NOTES
Progress Note      Patient Name: Jeimy Willard   Patient ID: [id number]   Sex: Female   YOB: 1955    Primary Care Provider: Jeaneth HOLLINS   Referring Provider: Jeaneth HOLLINS    Visit Date: October 5, 2020    Provider: GUNJAN Malloy   Location: [facility]   Location Address: [address] [place] [address]  [id number]   Location Phone: [phone]          Chief Complaint  · 3 Month Follow up for HTN, Insomnia, GERD, Anemia, and Vitamin D Deficiency      History Of Present Illness  Jeimy Willard is a 65 year old /White female who presents for evaluation and treatment of:      3 Month Follow up for HTN, Insomnia, GERD, Anemia, and Vitamin D Deficiency  Last lab work done 9/18/20  Med Refills needed (printed scripts)    Needing referrals to  and     Anemia - Dr. Sharma added Vitron iron w/ vit c taking 1 daily.    Flu shot- 9/26/20    HTN - well controlled.    Insomnia - Pt reports     Vit D - takes vit d otc daily.    Gerd - controlled.           Past Medical History  Disease Name Date Onset Notes   Anemia --  --    Arthritis --  --    Bone Density Screening 2/5/18 --    Cancer --  --    GERD (gastroesophageal reflux disease) --  --    Hypertension --  --    Insomnia --  --    Lymphoma, Malignant 1999 --    Osteoporosis --  --    Pap smear for cervical cancer screening 10/2018 --    Screening Mammogram 8/20/20 [facility]- 2019   Seasonal allergies --  --    Stomach Disorder --  --    Stomach Neoplasm, Malignant 2005 --    Ulcer --  --    Vitamin D deficiency --  --          Past Surgical History  Procedure Name Date Notes   Cholecystecomy --  --    Colonoscopy 2/20/19 02/20/2019 - Polyp (6mm) in ascending colon, Polyp (2mm) in sigmoid colon - Polypectomy, Mild diverticulosis of sigmoid colon, Grade 1 internal hemorrhoids.    EGD 03/13/2020 Normal mucosa in the whole esophagus, Nodular tissue visualized at the  esophago-jejunal anastomosis, Afferent and efferent small bowel limbs visualized. Patchy erythema and single erosion visualized in one jejunal limb. No gastric tissue visualized.   Gallbladder --  --    Gastrectomy, laparoscopic --  --          Medication List  Name Date Started Instructions   acetaminophen 325 mg oral tablet 10/05/2020 take 1 - 2 tablets (325 - 650 mg) by oral route every 4-6 hours as needed for 90 days   Ambien CR 6.25 mg oral tablet,ext release multiphase 10/05/2020 take 1 tablet (6.25 mg) by oral route once daily at bedtime for 30 days   celecoxib 200 mg oral capsule 07/15/2020 take 1 capsule (200 mg) by oral route once daily for 90 days   cyanocobalamin (vitamin B-12) 1,000 mcg/mL injection solution 07/15/2020 inject 1 milliliter (1,000 mcg) by intramuscular route once a month   folic acid 1 mg oral tablet 07/15/2020 take 1 tablet (1 mg) by oral route once daily for 90 days   lisinopril 10 mg oral tablet 07/15/2020 take 1 tablet (10 mg) by oral route once daily for 90 days   loratadine 10 mg oral tablet 07/15/2020 take 1 tablet (10 mg) by oral route once daily for 90 days   Miracle Cream 0 01/15/2020 clotrimazole/zinc/hydrocortisone apply to affected area tid   Ocuvite Eye Health 50 mg-15 unit- 4.5 mg-2.5 mg oral tablet,chewable  --    olopatadine 0.1 % ophthalmic (eye) drops  instill 1 drop into affected eye(s) by ophthalmic route 2 times per day at an interval of 6 to 8 hours   pantoprazole 40 mg oral tablet,delayed release (DR/EC) 07/15/2020 take 1 tablet (40 mg) by oral route once daily in the morning   Premarin 0.625 mg/gram vaginal cream 07/15/2020 insert one-fourth applicatorful (0.5 gram) by vaginal route twice weekly   PreserVision AREDS 7,160-113-100 unit-mg-unit oral tablet  take 1 tablet by oral route 2 times a day   Tums oral  --    Vitamin B-6 oral  --    Vitamin D3 1,000 unit oral tablet  take 1 tablet by oral route daily   Vitron-C 65 mg iron- 125 mg oral tablet,delayed release  (/EC)  take 1 tablet by oral route daily   Voltaren 1 % topical gel 07/15/2020 apply 2 grmas to left knee by topical route QID         Allergy List  Allergen Name Date Reaction Notes   NO KNOWN DRUG ALLERGIES --  --  --          Family Medical History  Disease Name Relative/Age Notes   Breast Neoplasm, Malignant Mother/   --    Lung Neoplasm, Malignant Grandmother (maternal)/   --    Stroke Mother/   Mother   Heart Disease Brother/  Mother/   Mother; Brother   Hypertension Brother/  Mother/   --    Cancer, Unspecified Brother/  Mother/   Mother; Brother   Diabetes, unspecified type Brother/  Father/  Mother/   Mother; Father; Brother   Alzheimer's Disease Grandfather (maternal)/   --    - No Family History of Colorectal Cancer  --    Renal failure Mother/   --    Diabetes Brother/  Father/  Mother/   --    Esophagus Neoplasm, Malignant Grandmother (paternal)/   --    Family history of Arthritis Mother/   Mother         Social History  Finding Status Start/Stop Quantity Notes   Alcohol Never --/-- --  --    lives with spouse --  --/-- --  --    . --  --/-- --  --    Recreational Drug Use Never --/-- --  no   Retired. --  --/-- --  --    Tobacco Never --/-- --  never smoker         Immunizations  NameDate Admin Mfg Trade Name Lot Number Route Inj VIS Given VIS Publication   Ysutyurfi58/26/2020 NE Not Entered  NE NE     Comments: Pt reported, administered at pharmacy         Review of Systems  · Constitutional  o Denies  o : fatigue, fever, weight gain, weight loss, chills  · Cardiovascular  o Denies  o : chest Pain, palpitations, edema (swelling)  · Respiratory  o Denies  o : frequent cough, shortness of breath  · Gastrointestinal  o Denies  o : nausea, vomiting, changes in bowel habits  · Genitourinary  o Denies  o : dysuria, urinary frequency, urinary urgency, polyuria  · Neurologic  o Denies  o : headache, tingling or numbness, dizziness  · Musculoskeletal  o Denies  o : joint pain,  "myalgias  · Endocrine  o Denies  o : polydipsia, polyphagia  · Psychiatric  o Denies  o : mood changes, memory changes, SI/HI  · Allergic-Immunologic  o Denies  o : eczema, seasonal allergies, urticaria      Vitals  Date Time BP Position Site L\R Cuff Size HR RR TEMP (F) WT  HT  BMI kg/m2 BSA m2 O2 Sat FR L/min FiO2 HC       01/15/2020 08:25 /81 Sitting    98 - R 18  204lbs 16oz 5'  3\" 36.31 2.03 97 %      02/11/2020 09:01 /73 Sitting       206lbs 6oz 5'  3\" 36.56 2.04       10/05/2020 07:33 /70 Sitting    82 - R 24 98.1 210lbs 0oz 5'  3\" 37.2 2.06 96 %            Physical Examination  · Constitutional  o Appearance  o : well-nourished, in no acute distress  · Eyes  o Conjunctivae  o : conjunctivae normal  o Sclerae  o : sclerae white  o Pupils and Irises  o : pupils equal and round  o Eyelids/Ocular Adnexae  o : eyelid appearance normal, no exudates present  · Ears, Nose, Mouth and Throat  o Ears  o :   § External Ears  § : external auditory canal appearance within normal limits, no discharge present  § Otoscopic Examination  § : tympanic membrane appearance within normal limits bilaterally, cerumen not present  o Nose  o :   § External Nose  § : appearance normal  § Intranasal Exam  § : mucosa within normal limits, vestibules normal, no intranasal lesions present, septum midline, sinuses non tender to palpation  § Nasopharynx  § : no discharge present  o Oral Cavity  o :   § Oral Mucosa  § : oral mucosa normal  § Lips  § : lip appearance normal  § Teeth  § : normal dentation for age  o Throat  o :   § Oropharynx  § : no inflammation or lesions present, tonsils within normal limits  · Neck  o Inspection/Palpation  o : normal appearance, no masses or tenderness, trachea midline  o Range of Motion  o : cervical range of motion within normal limits  o Thyroid  o : gland size normal, nontender, no nodules or masses present on palpation  · Respiratory  o Respiratory Effort  o : breathing " unlabored  o Inspection of Chest  o : normal appearance  o Auscultation of Lungs  o : normal breath sounds throughout inspiration and expiration  · Cardiovascular  o Heart  o :   § Auscultation of Heart  § : regular rate and rhythm, no murmurs, gallops or rubs  o Peripheral Vascular System  o :   § Carotid Arteries  § : normal pulses bilaterally, no bruits present  § Extremities  § : no clubbing or edema  · Gastrointestinal  o Abdominal Examination  o : abdomen nontender to palpation, tone normal without rigidity or guarding, no masses present, bowel sounds present  · Musculoskeletal  o Spine  o :   § Inspection/Palpation  § : no spinal tenderness, scoliosis or kyphosis present  § Range of Motion  § : spine range of motion normal  o Right Upper Extremity  o :   § Inspection/Palpation  § : no tenderness to palpation, ROM normal  o Left Upper Extremity  o :   § Inspection/Palpation  § : no tenderness to palpation, ROM normal  o Right Lower Extremity  o :   § Inspection/Palpation  § : no joint or limb tenderness to palpation, ROM normal  o Left Lower Extremity  o :   § Inspection/Palpation  § : no joint or limb tenderness to palpation, ROM normal  · Skin and Subcutaneous Tissue  o General Inspection  o : no rashes or lesions present, no areas of discoloration  o Body Hair  o : hair normal for age, general body hair distribution normal for age  o Digits and Nails  o : no clubbing, cyanosis, deformities or edema present, normal appearing nails  · Neurologic  o Mental Status Examination  o :   § Orientation  § : grossly oriented to person, place and time  o Gait and Station  o : normal gait, able to stand without difficulty  · Psychiatric  o Judgement and Insight  o : judgment and insight intact  o Mood and Affect  o : mood normal, affect appropriate          Results  · In-Office Procedures  o Lab procedure  § IOP - Urine Drug Screen In-House [facility] ([address])   § Amphetamines Ur Ql: Negative   § Barbiturates Ur Ql: Negative    § Buprenorphine+Nor Ur Ql Scn: Negative   § Benzodiaz Ur Ql: Negative   § Cocaine Ur Ql: Negative   § Methadone Ur Ql: Negative   § Methamphet Ur Ql: Negative   § MDMA Ur Ql Scn: Negative   § Opiates Ur Ql: Negative   § Oxycodone Ur Ql: Negative   § PCP Ur Ql: Negative   § THC Ur Ql: Negative   § Temp in Range?: Within/Acceptable   § Control Seen?: Yes       Assessment  · Screening for depression     V79.0/Z13.89  · Anemia     285.9/D64.9  · Essential hypertension     401.9/I10  · GERD (gastroesophageal reflux disease)     530.81/K21.9  · Insomnia, unspecified     780.52/G47.00  · Vitamin D deficiency     268.9/E55.9  · Knee pain, left     719.46/M25.562      Plan  · Orders  o LEESA Report (KASPR) - - 10/05/2020  o ACO-39: Current medications updated and reviewed (1159F, ) - - 10/05/2020  o ACO-18: Negative screen for clinical depression using a standardized tool () - - 10/05/2020  o ACO-14: Influenza immunization administered or previously received [facility] () - - 10/05/2020  o ORTHOPEDIC CONSULTATION (ORTHO) - 719.46/M25.562 - 10/05/2020   ch    referral  o HEMATOLOGY/ONCOLOGY CONSULTATION (HEMOC) - 285.9/D64.9 - 10/05/2020   [facility]  referral  · Medications  o Ambien CR 6.25 mg oral tablet,ext release multiphase   SIG: take 1 tablet (6.25 mg) by oral route once daily at bedtime for 30 days   DISP: (30) Tablet with 2 refills  Adjusted on 10/05/2020     o acetaminophen 325 mg oral tablet   SIG: take 1 - 2 tablets (325 - 650 mg) by oral route every 4-6 hours as needed for 90 days   DISP: (90) Tablet with 1 refills  Refilled on 10/05/2020     o Medications have been Reconciled  o Transition of Care or Provider Policy  · Instructions  o Depression Screen completed and scanned into the EMR under the designated folder within the patient's documents.  o Today's PHQ-9 result is __1_  o Patient advised to monitor blood pressure (B/P) at home and journal readings. Patient informed  that a B/P reading at home of more than 130/80 is considered hypertension. For readings greater xffh816/90 or higher patient is advised to follow up in the office with readings for management. Patient advised to limit sodium intake.  o Maintain a healthy weight. Avoid tight fitting clothes. Avoid fried, fatty foods, tomato sauce, chocolate, mint, garlic, onion, alcohol. caffeine. Eat smaller meals, dont lie down after a meal, dont smoke. Elevate the head of your bed 6-9 inches.  o Avoid any electronic use for at least 30 minutes prior to bed time. Cell phone screens, tablets and TVs imitate daylight, so your brain can become confused on the time of day. No caffeine use in the late afternoon and evenings.  o Obtained a written consent for LEESA query. Discussed the risk and benefits of the use of controlled substances with the patient, including the risk of tolerance and drug dependence. The patient has been counseled on the need to have an exit strategy, including potentially discontinuing the use of controlled substances. LEESA has or will be reviewed as soon as it becomes avaliable.  o Patient was educated/instructed on their diagnosis, treatment and medications prior to discharge from the clinic today.  o Call the office with any concerns or questions.  · Disposition  o Return to Office in 3 months.            Electronically Signed by: GUNJAN Malloy -Author on October 6, 2020 03:02:47 PM

## 2021-05-13 NOTE — PROGRESS NOTES
Progress Note      Patient Name: Jeimy Willard   Patient ID: [id number]   Sex: Female   YOB: 1955    Primary Care Provider: Jeaneth HOLLINS   Referring Provider: Jeaneth HOLLINS    Visit Date: July 14, 2020    Provider: Chuck Valdez MD   Location: [facility]   Location Address: [address] [place] [address]  [id number]   Location Phone: [phone]          Chief Complaint  · Follow up left knee pain      History Of Present Illness  Jeimy Willard is a 65 year old /White female who presents today to [facility].      The patient presents today for follow-up of left knee pain. She had a previous Zilretta injection which helped for about a month. The injection started to wear off. She has no new injuries. She is interested in knee replacement in the future but not any time soon.       Past Medical History  Anemia; Arthritis; Bone Density Screening; Cancer; GERD (gastroesophageal reflux disease); Hypertension; Insomnia; Lymphoma, Malignant; Osteoporosis; Pap smear for cervical cancer screening; Screening Mammogram; Seasonal allergies; Stomach Disorder; Stomach Neoplasm, Malignant; Ulcer; Vitamin D deficiency         Past Surgical History  Cholecystecomy; Colonoscopy; EGD; Gallbladder; Gastrectomy, laparoscopic         Medication List  acetaminophen 325 mg oral tablet; Ambien 5 mg oral tablet; celecoxib 200 mg oral capsule; cyanocobalamin (vitamin B-12) 1,000 mcg/mL injection solution; ferrous sulfate 325 mg (65 mg iron) oral tablet,delayed release (DR/EC); folic acid 1 mg oral tablet; lisinopril 10 mg oral tablet; loratadine 10 mg oral tablet; Miracle Cream 0; Ocuvite Eye Health 50 mg-15 unit- 4.5 mg-2.5 mg oral tablet,chewable; olopatadine 0.1 % ophthalmic (eye) drops; pantoprazole 40 mg oral tablet,delayed release (DR/EC); Premarin 0.625 mg/gram vaginal cream; PreserVision AREDS 7,160-113-100 unit-mg-unit oral tablet; Tums oral; Vitamin B-6 oral; Vitamin D3  "1,000 unit oral tablet         Allergy List  NO KNOWN DRUG ALLERGIES         Family Medical History  Breast Neoplasm, Malignant; Lung Neoplasm, Malignant; Stroke; Heart Disease; Hypertension; Cancer, Unspecified; Diabetes, unspecified type; Alzheimer's Disease; - No Family History of Colorectal Cancer; Renal failure; Diabetes; Esophagus Neoplasm, Malignant; Family history of Arthritis         Social History  Alcohol (Never); lives with spouse; .; Recreational Drug Use (Never); Retired.; Tobacco (Never)         Review of Systems  · Constitutional  o Denies  o : fever, chills, weight loss  · Cardiovascular  o Denies  o : chest pain, shortness of breath  · Gastrointestinal  o Denies  o : liver disease, heartburn, nausea, blood in stools  · Genitourinary  o Denies  o : painful urination, blood in urine  · Integument  o Denies  o : rash, itching  · Neurologic  o Denies  o : headache, weakness, loss of consciousness  · Musculoskeletal  o Denies  o : painful, swollen joints  · Psychiatric  o Denies  o : drug/alcohol addiction, anxiety, depression      Vitals  Date Time BP Position Site L\R Cuff Size HR RR TEMP (F) WT  HT  BMI kg/m2 BSA m2 O2 Sat        07/14/2020 08:23 AM      77 - R   204lbs 16oz 5'  3\" 36.31 2.03 97 %          Physical Examination  · Constitutional  o Appearance  o : well developed, well-nourished, no obvious deformities present  · Head and Face  o Head  o :   § Inspection  § : normocephalic  o Face  o :   § Inspection  § : no facial lesions  · Eyes  o Conjunctivae  o : conjunctivae normal  o Sclerae  o : sclerae white  · Ears, Nose, Mouth and Throat  o Ears  o :   § External Ears  § : appearance within normal limits  § Hearing  § : intact  o Nose  o :   § External Nose  § : appearance normal  · Neck  o Inspection/Palpation  o : normal appearance  o Range of Motion  o : full range of motion  · Respiratory  o Respiratory Effort  o : breathing unlabored  o Inspection of Chest  o : normal " appearance  o Auscultation of Lungs  o : no audible wheezing or rales  · Cardiovascular  o Heart  o : regular rate  · Gastrointestinal  o Abdominal Examination  o : soft and non-tender  · Skin and Subcutaneous Tissue  o General Inspection  o : intact, no rashes  · Psychiatric  o General  o : Alert and oriented x3  o Judgement and Insight  o : judgment and insight intact  o Mood and Affect  o : mood normal, affect appropriate  · Left Knee  o Inspection  o : no significant swelling, crepitus with ROM, ROM intact, stability intact, Neurovascularly intact   · Injection Note/Aspiration Note  o Site  o : left knee  o Procedure  o : Procedure: After educating the patient, patient gave consent for procedure. After using Chloraprep, the joint space was injected. The patient tolerated the procedure well.  o Medication  o : 80 mg of DepoMedrol with 9cc of 1% Lidocaine          Assessment  · Primary osteoarthritis of left knee     715.16/M17.12  · Pain: Knee     719.46/M25.569      Plan  · Orders  o Depo-Medrol injection 80mg () - 715.16/M17.12 - 07/15/2020   Lot [id number] Exp 06 2021 [employer] Administered by CELI PIPER MD  o Knee Intra-articular Injection without US Guidance [facility] ([id number]) - 715.16/M17.12 - 07/15/2020   Lot [id number] Exp 07 01 2021 [employer] Administered by CELI PIPER MD  · Medications  o Medications have been Reconciled  o Transition of Care or Provider Policy  · Instructions  o Reviewed the patient's Past Medical, Social, and Family history as well as the ROS at today's visit, no changes.  o Call or return if worsening symptoms.  o This note was transcribed by Delores Ludwig. edgar.  o Patient tolerated the left knee injection well today. Follow-up in 3 months with standing x-rays, 3 views of left knee.             Electronically Signed by: Delores Ludwig-, Other -Author on July 15, 2020 07:58:48 AM  Electronically Co-signed by: Celi Piper MD -Reviewer on July 15,  2020 10:15:31 PM

## 2021-05-13 NOTE — PROGRESS NOTES
Progress Note      Patient Name: Jeimy Willard   Patient ID: [id number]   Sex: Female   YOB: 1955    Primary Care Provider: Jeaneth HOLLINS   Referring Provider: Jeaneth HOLLINS    Visit Date: July 15, 2020    Provider: GUNJAN Malloy   Location: [facility]   Location Address: [address] [place] [address]  [id number]   Location Phone: [phone]          Chief Complaint  · 3 Month Follow up for HTN, Insomnia, GERD, Anemia, and Vitamin D Deficiency      History Of Present Illness  Video Conferencing Visit  Jeimy Willard is a 65 year old /White female who is presenting for evaluation via video conferencing via ZOOM. Verbal consent obtained before beginning visit.   The following staff were present during this visit: Sofiya Nash CMA   Jeimy Willard is a 65 year old /White female who presents for evaluation and treatment of:      3 Month Follow up for HTN, Insomnia, GERD, Anemia, and Vitamin D Deficiency.  Last lab work done 5/2020, next lab work due 8/20/20.  Med Refills needed including Ambien, Voltaren Gel for knee pain ( 4gm 3-4 x per day), and Premarin cream (1gm QHS 2x a week)    Pt was to have celiac panel done. had done at [facility] but was not a complete panel done and was told she needed to repeat the celiac panel. Have not received a call back from Myra Gould's office about the re-order of the lab.     HTN - pt reports b/p wnl at home when checking.    GERD - flaring 2-3 times per week. Pt continue with gastro and had recent EGD.    Insomnia - pt reports most nights getting 8 hours.     flu shot- 9/2019  Pap-10/2018  Dexa- 2/2018  Mammo- 2019 [facility]  Colon- 2/2019  Last UDS 1/15/20       Past Medical History  Disease Name Date Onset Notes   Anemia --  --    Arthritis --  --    Bone Density Screening 2/5/18 --    Cancer --  --    GERD (gastroesophageal reflux disease) --  --    Hypertension --  --    Insomnia --  --    Lymphoma,  Malignant 1999 --    Osteoporosis --  --    Pap smear for cervical cancer screening 10/2018 --    Screening Mammogram 2019 [facility]   Seasonal allergies --  --    Stomach Disorder --  --    Stomach Neoplasm, Malignant 2005 --    Ulcer --  --    Vitamin D deficiency --  --          Past Surgical History  Procedure Name Date Notes   Cholecystecomy --  --    Colonoscopy 2/20/19 02/20/2019 - Polyp (6mm) in ascending colon, Polyp (2mm) in sigmoid colon - Polypectomy, Mild diverticulosis of sigmoid colon, Grade 1 internal hemorrhoids.    EGD 03/13/2020 Normal mucosa in the whole esophagus, Nodular tissue visualized at the esophago-jejunal anastomosis, Afferent and efferent small bowel limbs visualized. Patchy erythema and single erosion visualized in one jejunal limb. No gastric tissue visualized.   Gallbladder --  --    Gastrectomy, laparoscopic --  --          Medication List  Name Date Started Instructions   acetaminophen 325 mg oral tablet 04/15/2020 take 1 - 2 tablets (325 - 650 mg) by oral route every 4-6 hours as needed for 90 days   Ambien 5 mg oral tablet 04/15/2020 take 1 tablet (5 mg) by oral route once daily at bedtime for 90 days   celecoxib 200 mg oral capsule 07/15/2020 take 1 capsule (200 mg) by oral route once daily for 90 days   cyanocobalamin (vitamin B-12) 1,000 mcg/mL injection solution 07/15/2020 inject 1 milliliter (1,000 mcg) by intramuscular route once a month   ferrous sulfate 325 mg (65 mg iron) oral tablet,delayed release (DR/EC)  take 1 tablet by oral route daily   folic acid 1 mg oral tablet 07/15/2020 take 1 tablet (1 mg) by oral route once daily for 90 days   lisinopril 10 mg oral tablet 07/15/2020 take 1 tablet (10 mg) by oral route once daily for 90 days   loratadine 10 mg oral tablet 07/15/2020 take 1 tablet (10 mg) by oral route once daily for 90 days   Miracle Cream 0 01/15/2020 clotrimazole/zinc/hydrocortisone apply to affected area tid   Zevia Eye TopFun 50 mg-15 unit- 4.5 mg-2.5 mg  oral tablet,chewable  --    olopatadine 0.1 % ophthalmic (eye) drops  instill 1 drop into affected eye(s) by ophthalmic route 2 times per day at an interval of 6 to 8 hours   pantoprazole 40 mg oral tablet,delayed release (DR/EC) 07/15/2020 take 1 tablet (40 mg) by oral route once daily in the morning   Premarin 0.625 mg/gram vaginal cream  --    PreserVision AREDS 7,160-113-100 unit-mg-unit oral tablet  take 1 tablet by oral route 2 times a day   Tums oral  --    Vitamin B-6 oral  --    Vitamin D3 1,000 unit oral tablet  take 1 tablet by oral route daily         Allergy List  Allergen Name Date Reaction Notes   NO KNOWN DRUG ALLERGIES --  --  --          Family Medical History  Disease Name Relative/Age Notes   Breast Neoplasm, Malignant Mother/   --    Lung Neoplasm, Malignant Grandmother (maternal)/   --    Stroke Mother/   Mother   Heart Disease Brother/  Mother/   Mother; Brother   Hypertension Brother/  Mother/   --    Cancer, Unspecified Brother/  Mother/   Mother; Brother   Diabetes, unspecified type Brother/  Father/  Mother/   Mother; Father; Brother   Alzheimer's Disease Grandfather (maternal)/   --    - No Family History of Colorectal Cancer  --    Renal failure Mother/   --    Diabetes Brother/  Father/  Mother/   --    Esophagus Neoplasm, Malignant Grandmother (paternal)/   --    Family history of Arthritis Mother/   Mother         Social History  Finding Status Start/Stop Quantity Notes   Alcohol Never --/-- --  --    lives with spouse --  --/-- --  --    . --  --/-- --  --    Recreational Drug Use Never --/-- --  no   Retired. --  --/-- --  --    Tobacco Never --/-- --  never smoker         Immunizations  NameDate Admin Mfg Trade Name Lot Number Route Inj VIS Given VIS Publication   [id number]/17/2019 NE Not Entered  NE NE     Comments: [facility]         Review of Systems  · Constitutional  o Denies  o : fatigue, fever, weight gain, weight loss,  "chills  · Cardiovascular  o Denies  o : chest Pain, palpitations, edema (swelling)  · Respiratory  o Denies  o : frequent cough, shortness of breath  · Gastrointestinal  o Admits  o : reflux/heartburn  o Denies  o : nausea, vomiting, changes in bowel habits  · Genitourinary  o Denies  o : dysuria, urinary frequency, urinary urgency, polyuria  · Neurologic  o Denies  o : headache, tingling or numbness, dizziness  · Musculoskeletal  o Admits  o : knee pain  o Denies  o : joint pain, myalgias  · Psychiatric  o Admits  o : difficulty sleeping  o Denies  o : mood changes, memory changes, SI/HI      Vitals  Date Time BP Position Site L\R Cuff Size HR RR TEMP (F) WT  HT  BMI kg/m2 BSA m2 O2 Sat HC       02/11/2020 09:01 /73 Sitting       206lbs 6oz 5'  3\" 36.56 2.04           Physical Examination  · Constitutional  o Appearance  o : NAD, alert and oriented, pleasant  · Neurologic  o Mental Status Examination  o :   § Orientation  § : grossly oriented to person, place and time          Assessment  · Asthma     493.90/J45.909  · Essential hypertension     401.9/I10  · GERD (gastroesophageal reflux disease)     530.81/K21.9  · Insomnia, unspecified     780.52/G47.00  · Vitamin D deficiency     268.9/E55.9  · Knee pain     719.46/M25.569  · Jejunal inflammation     558.9/K52.9  · Knee pain, left     719.46/M25.562      Plan  · Orders  o LEESA Report (KASPR) - - 07/15/2020  o ACO-39: Current medications updated and reviewed () - - 07/15/2020  o Celiac disease panel (70994, 27220) - 558.9/K52.9 - 07/15/2020  · Medications  o Voltaren 1 % topical gel   SIG: apply 2 grmas to left knee by topical route QID   DISP: (1) 100 gm tube with 1 refills  Prescribed on 07/15/2020     o Premarin 0.625 mg/gram vaginal cream   SIG: insert one-fourth applicatorful (0.5 gram) by vaginal route twice weekly   DISP: (1) 30 gm tube/kit with 5 refills  Adjusted on 07/15/2020     o Ambien 5 mg oral tablet   SIG: take 1 tablet (5 mg) by oral " route once daily at bedtime for 90 days   DISP: (90) tablet with 0 refills  Refilled on 07/15/2020     o celecoxib 200 mg oral capsule   SIG: take 1 capsule (200 mg) by oral route once daily for 90 days   DISP: (90) capsule with 1 refills  Refilled on 07/15/2020     o cyanocobalamin (vitamin B-12) 1,000 mcg/mL injection solution   SIG: inject 1 milliliter (1,000 mcg) by intramuscular route once a month   DISP: (3) 1 ml vial with 1 refills  Refilled on 07/15/2020     o folic acid 1 mg oral tablet   SIG: take 1 tablet (1 mg) by oral route once daily for 90 days   DISP: (90) tablet with 1 refills  Refilled on 07/15/2020     o lisinopril 10 mg oral tablet   SIG: take 1 tablet (10 mg) by oral route once daily for 90 days   DISP: (90) tablet with 1 refills  Refilled on 07/15/2020     o loratadine 10 mg oral tablet   SIG: take 1 tablet (10 mg) by oral route once daily for 90 days   DISP: (90) tablet with 1 refills  Refilled on 07/15/2020     o pantoprazole 40 mg oral tablet,delayed release (DR/EC)   SIG: take 1 tablet (40 mg) by oral route once daily in the morning   DISP: (90) tablets with 1 refills  Refilled on 07/15/2020     o Medications have been Reconciled  o Transition of Care or Provider Policy  · Instructions  o Patient advised to monitor blood pressure (B/P) at home and journal readings. Patient informed that a B/P reading at home of more than 130/80 is considered hypertension. For readings greater weit392/90 or higher patient is advised to follow up in the office with readings for management. Patient advised to limit sodium intake.  o Maintain a healthy weight. Avoid tight fitting clothes. Avoid fried, fatty foods, tomato sauce, chocolate, mint, garlic, onion, alcohol. caffeine. Eat smaller meals, dont lie down after a meal, dont smoke. Elevate the head of your bed 6-9 inches.  o Avoid any electronic use for at least 30 minutes prior to bed time. Cell phone screens, tablets and TVs imitate daylight, so your  brain can become confused on the time of day. No caffeine use in the late afternoon and evenings.  o Obtained a written consent for LEESA query. Discussed the risk and benefits of the use of controlled substances with the patient, including the risk of tolerance and drug dependence. The patient has been counseled on the need to have an exit strategy, including potentially discontinuing the use of controlled substances. LEESA has or will be reviewed as soon as it becomes avaliable.  o Patient was educated/instructed on their diagnosis, treatment and medications prior to discharge from the clinic today.  · Disposition  o Return to Office in 3 months.            Electronically Signed by: GUNJAN Malloy -Author on July 15, 2020 08:37:06 AM

## 2021-05-13 NOTE — PROGRESS NOTES
Progress Note      Patient Name: Jeimy Willard   Patient ID: [id number]   Sex: Female   YOB: 1955    Primary Care Provider: Jeaneth HOLLINS   Referring Provider: Jeaneth HOLLINS    Visit Date: May 7, 2020    Provider: GUNJAN Bartlett   Location: [facility]   Location Address: [address] [place] [address], [address]  [place], [place]  [id number]   Location Phone: [phone]          Chief Complaint  · Follow-up of EGD      History Of Present Illness  Video Conferencing Visit  Jeimy Willard is a 65 year old /White female who is presenting for evaluation via video conferencing. Verbal consent obtained before beginning visit.   The following staff were present during this visit: Carmencita Cage MA; GUNJAN Bartlett      She presents for follow-up GERD and history of non-Hodgkin's lymphoma.    3/13/2020 EGD-normal esophagus.  Nodular tissue visualized at the esophago-jejunal anastomosis  No gastric tissue visualized.  Patchy erythema and single erosion visualized in 1 jejunal limb-chronic active inflammation and villous blunting.  Rx sent for Carafate.    She reports that reflux is improved since taking Carafate.  Denies dysphagia.      She obtained celiac labs at [facility], but results have not been sent.  She denies having any issues when eating gluten containing foods.      She is on Celebrex  and has been on this for several years.             Past Medical History  Anemia; Arthritis; Bone Density Screening; Cancer; GERD (gastroesophageal reflux disease); Hypertension; Insomnia; Lymphoma, Malignant; Osteoporosis; Pap smear for cervical cancer screening; Screening Mammogram; Seasonal allergies; Stomach Disorder; Stomach Neoplasm, Malignant; Ulcer; Vitamin D deficiency         Past Surgical History  Cholecystecomy; Colonoscopy; EGD; Gallbladder; Gastrectomy, laparoscopic         Medication List  acetaminophen 325 mg oral tablet; Ambien 5 mg oral tablet; celecoxib 200 mg  "oral capsule; cyanocobalamin (vitamin B-12) 1,000 mcg/mL injection solution; folic acid 1 mg oral tablet; lisinopril 10 mg oral tablet; loratadine 10 mg oral tablet; Miracle Cream 0; Ocuvite Eye Health 50 mg-15 unit- 4.5 mg-2.5 mg oral tablet,chewable; olopatadine 0.1 % ophthalmic (eye) drops; pantoprazole 40 mg oral tablet,delayed release (DR/EC); Premarin 0.625 mg/gram vaginal cream; PreserVision AREDS 7,160-113-100 unit-mg-unit oral tablet; Tums oral; Vitamin B-6 oral; Vitamin D3 1,000 unit oral tablet         Allergy List  NO KNOWN DRUG ALLERGIES       Allergies Reconciled  Family Medical History  Breast Neoplasm, Malignant; Lung Neoplasm, Malignant; Stroke; Heart Disease; Hypertension; Cancer, Unspecified; Diabetes, unspecified type; Alzheimer's Disease; - No Family History of Colorectal Cancer; Renal failure; Diabetes; Esophagus Neoplasm, Malignant; Family history of Arthritis         Social History  Alcohol (Never); lives with spouse; .; Recreational Drug Use (Never); Retired.; Tobacco (Never)         Immunizations  Name Date Admin   Influenza          Review of Systems  · Constitutional  o Denies  o : chills, fever  · Cardiovascular  o Denies  o : chest pain, irregular heart beats  · Respiratory  o Denies  o : cough, shortness of breath  · Gastrointestinal  o Admits  o : see HPI   · Endocrine  o Denies  o : weight gain, weight loss      Vitals  Date Time BP Position Site L\R Cuff Size HR RR TEMP (F) WT  HT  BMI kg/m2 BSA m2 O2 Sat        05/07/2020 01:34 PM         205lbs 16oz 5'  3\" 36.49 2.04           Physical Examination  · Constitutional  o Appearance  o : Healthy-appearing, awake and alert in no acute distress  · Head and Face  o Head  o : Normocephalic with no worriesome skin lesions  · Eyes  o Vision  o :   § Visual Fields  § : eyes move symmetrical in all directions  o Sclerae  o : sclerae anicteric  o Pupils and Irises  o : pupils equal and symmetrical  · Neck  o Inspection/Palpation  o : " normal appearance, trachea midline  · Respiratory  o Respiratory Effort  o : Breathing is unlabored.  · Skin and Subcutaneous Tissue  o General Inspection  o : no lesions present, no areas of discoloration  · Psychiatric  o General  o : Alert and oriented x3  o Mood and Affect  o : Mood and affect are appropriate to circumstances          Assessment  · Gastroesophageal Reflux     530.81/K21.9  · History of non-Hodgkin's lymphoma     V10.79/Z85.72  with gastrectomy      Plan  · Medications  o Medications have been Reconciled  · Instructions  o Information given on current diagnoses.  · Disposition  o Follow up PRN - Call if any change in bowel pattern, abdominal pain, rectal bleeding, or any new GI complaint.            Electronically Signed by: GUNJAN Bartlett -Author on May 7, 2020 02:14:48 PM

## 2021-05-13 NOTE — PROGRESS NOTES
Progress Note      Patient Name: Jeimy Willard   Patient ID: [id number]   Sex: Female   YOB: 1955    Primary Care Provider: Jeaneth HOLLINS   Referring Provider: Jeaneth HOLLINS    Visit Date: November 19, 2020    Provider: Chuck Valdez MD   Location: [facility]   Location Address: [address] [place] [address]  [id number]   Location Phone: [phone]          Chief Complaint  · Followup status post left total knee arthroplasty 11/04/2020.       History Of Present Illness  Jeimy Willard is a 65 year old /White female who presents today for followup of the left knee. She is doing well today and is having no problems. She has been attending physical therapy and has been making gains with therapy. No redness, drainage, or signs of infection. She has been wearing her RUBI hose and using her medication as directed.       Past Medical History  Anemia; Arthritis; Bone Density Screening; Cancer; GERD (gastroesophageal reflux disease); Hypertension; Insomnia; Lymphoma, Malignant; Osteoporosis; Pap smear for cervical cancer screening; Screening Mammogram; Seasonal allergies; Stomach Disorder; Stomach Neoplasm, Malignant; Ulcer; Vitamin D deficiency         Past Surgical History  Cholecystecomy; Colonoscopy; EGD; Gallbladder; Gastrectomy, laparoscopic         Medication List  acetaminophen 325 mg oral tablet; Ambien CR 6.25 mg oral tablet,ext release multiphase; celecoxib 200 mg oral capsule; cyanocobalamin (vitamin B-12) 1,000 mcg/mL injection solution; Eliquis 2.5 mg oral tablet; folic acid 1 mg oral tablet; hydrocodone-acetaminophen 7.5-325 mg oral tablet; lisinopril 10 mg oral tablet; Miracle Cream 0; Norco 7.5-325 mg oral tablet; olopatadine 0.1 % ophthalmic (eye) drops; pantoprazole 40 mg oral tablet,delayed release (DR/EC); Premarin 0.625 mg/gram vaginal cream; PreserVision AREDS 7,160-113-100 unit-mg-unit oral tablet; Tums oral; Vitamin B-6 oral; Vitamin D3 125 mcg  "(5,000 unit) oral tablet; Vitron-C 65 mg iron- 125 mg oral tablet,delayed release (DR/EC); Voltaren 1 % topical gel         Allergy List  NO KNOWN DRUG ALLERGIES         Family Medical History  Breast Neoplasm, Malignant; Lung Neoplasm, Malignant; Stroke; Heart Disease; Hypertension; Cancer, Unspecified; Diabetes, unspecified type; Alzheimer's Disease; - No Family History of Colorectal Cancer; Renal failure; Diabetes; Esophagus Neoplasm, Malignant; Family history of Arthritis         Social History  Alcohol (Never); lives with spouse; .; Recreational Drug Use (Never); Retired.; Tobacco (Never)         Review of Systems  · Constitutional  o Denies  o : fever, chills, weight loss  · Cardiovascular  o Denies  o : chest pain, shortness of breath  · Gastrointestinal  o Denies  o : liver disease, heartburn, nausea, blood in stools  · Genitourinary  o Denies  o : painful urination, blood in urine  · Integument  o Denies  o : rash, itching  · Neurologic  o Denies  o : headache, weakness, loss of consciousness  · Musculoskeletal  o Denies  o : painful, swollen joints  · Psychiatric  o Denies  o : drug/alcohol addiction, anxiety, depression      Vitals  Date Time BP Position Site L\R Cuff Size HR RR TEMP (F) WT  HT  BMI kg/m2 BSA m2 O2 Sat FR L/min FiO2 HC       11/19/2020 10:02 AM         207lbs 2oz 5'  2\" 37.88 2.03             Physical Examination  · Constitutional  o Appearance  o : well developed, well-nourished, no obvious deformities present  · Head and Face  o Head  o :   § Inspection  § : normocephalic  o Face  o :   § Inspection  § : no facial lesions  · Eyes  o Conjunctivae  o : conjunctivae normal  o Sclerae  o : sclerae white  · Ears, Nose, Mouth and Throat  o Ears  o :   § External Ears  § : appearance within normal limits  § Hearing  § : intact  o Nose  o :   § External Nose  § : appearance normal  · Neck  o Inspection/Palpation  o : normal appearance  o Range of Motion  o : full range of " motion  · Respiratory  o Respiratory Effort  o : breathing unlabored  o Inspection of Chest  o : normal appearance  o Auscultation of Lungs  o : no audible wheezing or rales  · Cardiovascular  o Heart  o : regular rate  · Gastrointestinal  o Abdominal Examination  o : soft and non-tender  · Skin and Subcutaneous Tissue  o General Inspection  o : intact, no rashes  · Psychiatric  o General  o : Alert and oriented x3  o Judgement and Insight  o : judgment and insight intact  o Mood and Affect  o : mood normal, affect appropriate  · Left Knee  o Inspection  o : Incisions are well healing. Staples removed. Small area of bleeding where staples were removed. No signs of infection. Range of motion -3 to 102. Stable to varus/valgus stress. Stable to anterior/posterior drawer. Ambulates with walker with mild antalgic gait.   · In Office Procedures  o View  o : AP/LATERAL/SUNRISE  o Site  o : Left knee  o Indication  o : Left total knee arthroplasty  o Study  o : X-rays ordered, taken in the office, and reviewed today.  o Xray  o : Intact total knee replacement without evidence of subsidence, loosening, or periprosthetic fracture.   o Comparative Data  o : Comparative Data found and reviewed today           Assessment  · Aftercare following left total knee arthroplasty 11/04/2020     V54.81/Z47.1  · Left knee pain, unspecified chronicity     719.46/M25.562      Plan  · Orders  o Knee (Left) [facility]erred View (17151-ZS) - 719.46/M25.562 - 11/19/2020  · Instructions  o Staples removed in clinic today.  o Reviewed the patient's Past Medical, Social, and Family history as well as the ROS at today's visit, no changes.  o Call or return if worsening symptoms.  o Note transcribed by Lauren Leonard. edgar  o She is doing well today. Continue with physical therapy. Steri-Strips placed on the incision today. Given a refill for pain medication, as well as 2 more weeks of Eliquis, as she is not able to take aspirin. Follow up in 4 weeks  to recheck.   · Referrals  o ID: [id number] Date: 07/14/2020 Type: Inbound  Specialty: Orthopedic Surgery            Electronically Signed by: Lauren Leonard-, Other -Author on November 21, 2020 02:28:39 PM  Electronically Co-signed by: Chuck Valdez MD -Reviewer on November 21, 2020 09:25:19 PM

## 2021-05-14 VITALS
RESPIRATION RATE: 24 BRPM | HEIGHT: 63 IN | OXYGEN SATURATION: 97 % | TEMPERATURE: 98.8 F | SYSTOLIC BLOOD PRESSURE: 105 MMHG | HEART RATE: 100 BPM | DIASTOLIC BLOOD PRESSURE: 63 MMHG

## 2021-05-14 VITALS — HEIGHT: 63 IN | WEIGHT: 210.25 LBS | BODY MASS INDEX: 37.25 KG/M2

## 2021-05-14 VITALS — BODY MASS INDEX: 37.61 KG/M2 | HEART RATE: 115 BPM | OXYGEN SATURATION: 98 % | WEIGHT: 204.37 LBS | HEIGHT: 62 IN

## 2021-05-14 VITALS
OXYGEN SATURATION: 96 % | SYSTOLIC BLOOD PRESSURE: 126 MMHG | TEMPERATURE: 98.1 F | HEART RATE: 82 BPM | WEIGHT: 210 LBS | BODY MASS INDEX: 37.21 KG/M2 | DIASTOLIC BLOOD PRESSURE: 70 MMHG | HEIGHT: 63 IN | RESPIRATION RATE: 24 BRPM

## 2021-05-14 VITALS
OXYGEN SATURATION: 98 % | DIASTOLIC BLOOD PRESSURE: 85 MMHG | HEIGHT: 62 IN | SYSTOLIC BLOOD PRESSURE: 123 MMHG | HEART RATE: 88 BPM | RESPIRATION RATE: 18 BRPM | TEMPERATURE: 98 F | WEIGHT: 201 LBS | BODY MASS INDEX: 36.99 KG/M2

## 2021-05-14 VITALS
OXYGEN SATURATION: 97 % | SYSTOLIC BLOOD PRESSURE: 117 MMHG | DIASTOLIC BLOOD PRESSURE: 78 MMHG | WEIGHT: 201.37 LBS | HEIGHT: 62 IN | HEART RATE: 94 BPM | TEMPERATURE: 98.6 F | RESPIRATION RATE: 18 BRPM | BODY MASS INDEX: 37.06 KG/M2

## 2021-05-14 VITALS — HEART RATE: 103 BPM | WEIGHT: 202.12 LBS | HEIGHT: 62 IN | BODY MASS INDEX: 37.19 KG/M2 | OXYGEN SATURATION: 98 %

## 2021-05-14 VITALS — WEIGHT: 203 LBS | OXYGEN SATURATION: 98 % | BODY MASS INDEX: 37.36 KG/M2 | HEART RATE: 85 BPM | HEIGHT: 62 IN

## 2021-05-14 VITALS — WEIGHT: 207.12 LBS | BODY MASS INDEX: 38.12 KG/M2 | HEIGHT: 62 IN

## 2021-05-14 NOTE — PROGRESS NOTES
Progress Note      Patient Name: Jeimy Willard   Patient ID: [id number]   Sex: Female   YOB: 1955    Primary Care Provider: Jeaneth HOLLINS   Referring Provider: Jeaneth HOLLINS    Visit Date: May 3, 2021    Provider: Arnulfo Burnette PA-C   Location: [facility]   Location Address: [address] [place] [address]  [id number]   Location Phone: [phone]          Chief Complaint  · Left TKA      History Of Present Illness  Jeimy Willard is a 66 year old /White female who presents today to [facility]. Patient presents for follow-up evaluation of left total knee arthroplasty, 11/4/2020. Patient states that she is doing well she states she has been able to use the knee, goes up and down stairs, states the knee feels somewhat better compared to prior to her surgery. Patient is about to take a cross-country road trip, she denies swelling, denies locking catching or buckling of the knee. No new complaints.       Past Medical History  Anemia; Arthritis; Bone Density Screening; Cancer; GERD (gastroesophageal reflux disease); Hypertension; Insomnia; Lymphoma, Malignant; Osteoporosis; Pap smear for cervical cancer screening; Screening Mammogram; Seasonal allergies; Stomach Disorder; Stomach Neoplasm, Malignant; Ulcer; Vitamin D deficiency         Past Surgical History  Cholecystecomy; Colonoscopy; EGD; Gallbladder; Gastrectomy, laparoscopic; Total knee replacement         Medication List  acetaminophen 325 mg oral tablet; Ambien CR 6.25 mg oral tablet,ext release multiphase; celecoxib 200 mg oral capsule; cyanocobalamin (vitamin B-12) 1,000 mcg/mL injection solution; folic acid 1 mg oral tablet; lisinopril 10 mg oral tablet; loratadine 10 mg oral tablet; Miracle Cream 0; olopatadine 0.1 % ophthalmic (eye) drops; pantoprazole 40 mg oral tablet,delayed release (DR/EC); Pepcid 40 mg oral tablet; Premarin 0.625 mg/gram vaginal cream; PreserVision AREDS  "7,160-113-100 unit-mg-unit oral tablet; Tums oral; Vitamin B-6 oral; Vitamin D3 125 mcg (5,000 unit) oral tablet; Vitron-C 65 mg iron- 125 mg oral tablet,delayed release (DR/EC); Voltaren 1 % topical gel         Allergy List  NO KNOWN DRUG ALLERGIES       Allergies Reconciled  Family Medical History  Breast Neoplasm, Malignant; Lung Neoplasm, Malignant; Stroke; Heart Disease; Hypertension; Cancer, Unspecified; Diabetes, unspecified type; Alzheimer's Disease; - No Family History of Colorectal Cancer; Renal failure; Diabetes; Esophagus Neoplasm, Malignant; Family history of Arthritis         Social History  Alcohol (Never); lives with spouse; .; Recreational Drug Use (Never); Retired.; Tobacco (Never)         Immunizations  Name Date Admin   Influenza 09/26/2020   Influenza 09/17/2019         Review of Systems  · Constitutional  o Denies  o : fever, chills, weight loss  · Cardiovascular  o Denies  o : chest pain, shortness of breath  · Gastrointestinal  o Denies  o : liver disease, heartburn, nausea, blood in stools  · Genitourinary  o Denies  o : painful urination, blood in urine  · Integument  o Denies  o : rash, itching  · Neurologic  o Denies  o : headache, weakness, loss of consciousness  · Musculoskeletal  o Denies  o : painful, swollen joints  · Psychiatric  o Denies  o : drug/alcohol addiction, anxiety, depression      Vitals  Date Time BP Position Site L\R Cuff Size HR RR TEMP (F) WT  HT  BMI kg/m2 BSA m2 O2 Sat FR L/min FiO2        05/03/2021 09:03 AM      85 - R   203lbs 0oz 5'  2\" 37.13 2.01 98 %            Physical Examination  · Constitutional  o Appearance  o : well developed, well-nourished, no obvious deformities present  · Head and Face  o Head  o :   § Inspection  § : normocephalic  o Face  o :   § Inspection  § : no facial lesions  · Eyes  o Conjunctivae  o : conjunctivae normal  o Sclerae  o : sclerae white  · Ears, Nose, Mouth and Throat  o Ears  o :   § External Ears  § : appearance " within normal limits  § Hearing  § : intact  o Nose  o :   § External Nose  § : appearance normal  · Neck  o Inspection/Palpation  o : normal appearance  o Range of Motion  o : full range of motion  · Respiratory  o Respiratory Effort  o : breathing unlabored  o Inspection of Chest  o : normal appearance  o Auscultation of Lungs  o : no audible wheezing or rales  · Cardiovascular  o Heart  o : regular rate  · Gastrointestinal  o Abdominal Examination  o : soft and non-tender  · Skin and Subcutaneous Tissue  o General Inspection  o : intact, no rashes  · Psychiatric  o General  o : Alert and oriented x3  o Judgement and Insight  o : judgment and insight intact  o Mood and Affect  o : mood normal, affect appropriate  · Left Knee  o Inspection  o : Incision is well-healed, no erythema, no ecchymosis, no swelling, no effusion, no signs of infection, nontender to palpation, full extension, flexion 120, stable anterior/posterior drawer, stable to varus/valgus stress. No pain with range of motion, neurovascularly intact.  · In Office Procedures  o View  o : AP/LATERAL/SUNRISE  o Site  o : left, knee  o Indication  o : Left knee pain  o Study  o : X-rays ordered, taken in the office, and reviewed today.  o Xray  o : Intact appearing left total knee arthroplasty, good alignment, no periprosthetic fracture, no signs of hardware failure.  o Comparative Data  o : No comparative data found          Assessment  · Aftercare following surgery of left total knee arthroplasty, 11/4/2020     V54.81  · Left knee pain, unspecified chronicity     719.46/M25.562      Plan  · Orders  o Knee (Left) [facility] Preferred View ([id number]) - 719.46/M25.562 - 05/03/2021  · Medications  o Medications have been Reconciled  o Transition of Care or Provider Policy  · Instructions  o Reviewed the patient's Past Medical, Social, and Family history as well as the ROS at today's visit, no changes.  o Call or return if worsening symptoms.  o Follow up in 6  months.  o Reviewed x-rays with the patient, patient was advised to continue activity and weightbearing as tolerated, follow-up in 6 months for reevaluation with x-rays            Electronically Signed by: MALCOLM Hernanedz-TUCKER -Author on May 3, 2021 10:14:18 AM  Electronically Co-signed by: Chuck Valdez MD -Reviewer on May 3, 2021 02:08:06 PM

## 2021-05-14 NOTE — PROGRESS NOTES
Progress Note      Patient Name: Jeimy Willard   Patient ID: [id number]   Sex: Female   YOB: 1955    Primary Care Provider: Jeaneth HOLLINS   Referring Provider: Jeaneth HOLLINS    Visit Date: March 31, 2021    Provider: GUNJAN Malloy   Location: [facility]   Location Address: [address] [place] [address]  [id number]   Location Phone: [phone]          Chief Complaint  · 3 Month Follow up for HTN, Insomnia, GERD, Anemia, and Vitamin D Deficiency      History Of Present Illness  Jeimy Willard is a 66 year old /White female who presents for evaluation and treatment of:      3 Month Follow up for HTN, Insomnia, GERD, Anemia, and Vitamin D Deficiency  Last lab work done 3/15/21, Need to review results  Med Refills needed    HTN - well controlled.     GERD - pt reports compliance with meds. some flaring approx. 3-4 times per week, denies diet related.     Insomnia - pt is getting approx. 7 hours of sleep per night and awakens rested.     Anemia - mild. pt continues with supplement.       UDS- 10/5/20  Narcotic Consent- 10/5/20    flu shot- 9/26/20  Pap-10/2018  Dexa- 2/2018  Mammo- 2019 [facility]  Colon- 2/20/2019 Repeat in 3-5 yrs       Past Medical History  Disease Name Date Onset Notes   Anemia --  --    Arthritis --  --    Bone Density Screening 2/5/18 --    Cancer --  --    GERD (gastroesophageal reflux disease) --  --    Hypertension --  --    Insomnia --  --    Lymphoma, Malignant 1999 --    Osteoporosis --  --    Pap smear for cervical cancer screening 10/2018 --    Screening Mammogram 8/20/20 [facility]- 2019   Seasonal allergies --  --    Stomach Disorder --  --    Stomach Neoplasm, Malignant 2005 --    Ulcer --  --    Vitamin D deficiency --  --          Past Surgical History  Procedure Name Date Notes   Cholecystecomy --  --    Colonoscopy 2/20/19 02/20/2019 - Polyp (6mm) in ascending colon, Polyp (2mm) in sigmoid colon -  Polypectomy, Mild diverticulosis of sigmoid colon, Grade 1 internal hemorrhoids. Repeat in 3-5 yrs    EGD 03/13/2020 Normal mucosa in the whole esophagus, Nodular tissue visualized at the esophago-jejunal anastomosis, Afferent and efferent small bowel limbs visualized. Patchy erythema and single erosion visualized in one jejunal limb. No gastric tissue visualized.   Gallbladder --  --    Gastrectomy, laparoscopic --  --    Total knee replacement 2020 left         Medication List  Name Date Started Instructions   acetaminophen 325 mg oral tablet 10/05/2020 take 1 - 2 tablets (325 - 650 mg) by oral route every 4-6 hours as needed for 90 days   Ambien CR 6.25 mg oral tablet,ext release multiphase 01/04/2021 take 1 tablet (6.25 mg) by oral route once daily at bedtime for 30 days   celecoxib 200 mg oral capsule 01/04/2021 take 1 capsule (200 mg) by oral route once daily for 90 days   cyanocobalamin (vitamin B-12) 1,000 mcg/mL injection solution 01/04/2021 inject 1 milliliter (1,000 mcg) by intramuscular route once a month   folic acid 1 mg oral tablet 01/04/2021 take 1 tablet (1 mg) by oral route once daily for 90 days   lisinopril 10 mg oral tablet 01/04/2021 take 1 tablet (10 mg) by oral route once daily for 90 days   loratadine 10 mg oral tablet 01/06/2021 take 1 tablet (10 mg) by oral route once daily for 90 days   Miracle Cream 0 01/15/2020 clotrimazole/zinc/hydrocortisone apply to affected area tid   Norco 7.5-325 mg oral tablet 01/07/2021 take 1 tablet by oral route every 4-6 hours as needed for pain   olopatadine 0.1 % ophthalmic (eye) drops  instill 1 drop into affected eye(s) by ophthalmic route 2 times per day at an interval of 6 to 8 hours   pantoprazole 40 mg oral tablet,delayed release (DR/EC) 01/04/2021 take 1 tablet (40 mg) by oral route once daily in the morning   Premarin 0.625 mg/gram vaginal cream 01/04/2021 insert one-fourth applicatorful (0.5 gram) by vaginal route twice weekly   PreserVision AREDS  7,160-113-100 unit-mg-unit oral tablet  take 1 tablet by oral route 2 times a day   Tums oral  --    Vitamin B-6 oral  --    Vitamin D3 125 mcg (5,000 unit) oral tablet  take 1 tablet by oral route 2 times a day   Vitron-C 65 mg iron- 125 mg oral tablet,delayed release (DR/EC)  take 1 tablet by oral route daily   Voltaren 1 % topical gel 01/04/2021 apply 2 grmas to left knee by topical route QID         Allergy List  Allergen Name Date Reaction Notes   NO KNOWN DRUG ALLERGIES --  --  --          Family Medical History  Disease Name Relative/Age Notes   Breast Neoplasm, Malignant Mother/   --    Lung Neoplasm, Malignant Grandmother (maternal)/   --    Stroke Mother/   Mother   Heart Disease Brother/  Mother/   Mother; Brother   Hypertension Brother/  Mother/   --    Cancer, Unspecified Brother/  Mother/   Mother; Brother   Diabetes, unspecified type Brother/  Father/  Mother/   Mother; Father; Brother   Alzheimer's Disease Grandfather (maternal)/   --    - No Family History of Colorectal Cancer  --    Renal failure Mother/   --    Diabetes Brother/  Father/  Mother/   --    Esophagus Neoplasm, Malignant Grandmother (paternal)/   --    Family history of Arthritis Mother/   Mother         Social History  Finding Status Start/Stop Quantity Notes   Alcohol Never --/-- --  --    lives with spouse --  --/-- --  --    . --  --/-- --  --    Recreational Drug Use Never --/-- --  no   Retired. --  --/-- --  --    Tobacco Never --/-- --  never smoker         Immunizations  NameDate Admin Mfg Trade Name Lot Number Route Inj VIS Given VIS Publication   [id number] NE Not Entered  NE NE     Comments: Pt reported, administered at pharmacy         Review of Systems  · Constitutional  o Denies  o : fatigue, fever, weight gain, weight loss, chills  · Cardiovascular  o Denies  o : chest Pain, palpitations, edema (swelling)  · Respiratory  o Denies  o : frequent cough, shortness of  "breath  · Gastrointestinal  o Admits  o : reflux/heartburn  o Denies  o : nausea, vomiting, changes in bowel habits  · Genitourinary  o Denies  o : dysuria, urinary frequency, urinary urgency, polyuria  · Neurologic  o Denies  o : headache, tingling or numbness, dizziness  · Musculoskeletal  o Denies  o : joint pain, myalgias  · Psychiatric  o Admits  o : difficulty sleeping  o Denies  o : mood changes, memory changes, SI/HI      Vitals  Date Time BP Position Site L\R Cuff Size HR RR TEMP (F) WT  HT  BMI kg/m2 BSA m2 O2 Sat FR L/min FiO2 HC       01/04/2021 08:23 /78 Sitting    94 - R 18 98.6 201lbs 6oz 5'  2.5\" 36.24 2.01 97 %      02/01/2021 08:56 AM      103 - R   202lbs 2oz 5'  2.5\" 36.38 2.01 98 %      03/31/2021 07:51 /85 Sitting    88 - R 18 98 201lbs 0oz 5'  2.5\" 36.18 2.01 98 %            Physical Examination  · Constitutional  o Appearance  o : well-nourished, in no acute distress  · Eyes  o Conjunctivae  o : conjunctivae normal  o Sclerae  o : sclerae white  o Pupils and Irises  o : pupils equal and round  o Eyelids/Ocular Adnexae  o : eyelid appearance normal, no exudates present  · Neck  o Inspection/Palpation  o : normal appearance, no masses or tenderness, trachea midline  o Range of Motion  o : cervical range of motion within normal limits  o Thyroid  o : gland size normal, nontender, no nodules or masses present on palpation  · Respiratory  o Respiratory Effort  o : breathing unlabored  o Inspection of Chest  o : normal appearance  o Auscultation of Lungs  o : normal breath sounds throughout inspiration and expiration  · Cardiovascular  o Heart  o :   § Auscultation of Heart  § : regular rate and rhythm, no murmurs, gallops or rubs  o Peripheral Vascular System  o :   § Carotid Arteries  § : normal pulses bilaterally, no bruits present  · Gastrointestinal  o Abdominal Examination  o : abdomen nontender to palpation, tone normal without rigidity or guarding, no masses present, bowel " sounds present  · Skin and Subcutaneous Tissue  o General Inspection  o : no rashes or lesions present, no areas of discoloration  o Body Hair  o : hair normal for age, general body hair distribution normal for age  o Digits and Nails  o : no clubbing, cyanosis, deformities or edema present, normal appearing nails  · Neurologic  o Mental Status Examination  o :   § Orientation  § : grossly oriented to person, place and time  o Gait and Station  o : normal gait, able to stand without difficulty  · Psychiatric  o Judgement and Insight  o : judgment and insight intact  o Mood and Affect  o : mood normal, affect appropriate          Assessment  · Anemia     285.9/D64.9  · Essential hypertension     401.9/I10  · GERD (gastroesophageal reflux disease)     530.81/K21.9  · Insomnia, unspecified     780.52/G47.00      Plan  · Orders  o LEESA Report (KASPR) - - 03/31/2021  o ACO-39: Current medications updated and reviewed (1159F, ) - - 03/31/2021  · Medications  o Pepcid 40 mg oral tablet   SIG: take 1 tablet (40 mg) by oral route once daily at bedtime for 90 days   DISP: (90) Tablet with 1 refills  Prescribed on 03/31/2021     o Ambien CR 6.25 mg oral tablet,ext release multiphase   SIG: take 1 tablet (6.25 mg) by oral route once daily at bedtime for 30 days   DISP: (30) Tablet with 2 refills  Refilled on 03/31/2021     o celecoxib 200 mg oral capsule   SIG: take 1 capsule (200 mg) by oral route once daily for 90 days   DISP: (90) Capsule with 1 refills  Refilled on 03/31/2021     o folic acid 1 mg oral tablet   SIG: take 1 tablet (1 mg) by oral route once daily for 90 days   DISP: (90) Tablet with 1 refills  Refilled on 03/31/2021     o lisinopril 10 mg oral tablet   SIG: take 1 tablet (10 mg) by oral route once daily for 90 days   DISP: (90) Tablet with 1 refills  Refilled on 03/31/2021     o loratadine 10 mg oral tablet   SIG: take 1 tablet (10 mg) by oral route once daily for 90 days   DISP: (90) Tablet with 1  refills  Refilled on 03/31/2021     o pantoprazole 40 mg oral tablet,delayed release (DR/EC)   SIG: take 1 tablet (40 mg) by oral route once daily in the morning   DISP: (90) Tablet with 1 refills  Refilled on 03/31/2021     o Premarin 0.625 mg/gram vaginal cream   SIG: insert one-fourth applicatorful (0.5 gram) by vaginal route twice weekly   DISP: (1) Tube with 5 refills  Refilled on 03/31/2021     o Medications have been Reconciled  o Transition of Care or Provider Policy  · Instructions  o Maintain a healthy weight. Avoid tight fitting clothes. Avoid fried, fatty foods, tomato sauce, chocolate, mint, garlic, onion, alcohol. caffeine. Eat smaller meals, dont lie down after a meal, dont smoke. Elevate the head of your bed 6-9 inches.  o Avoid any electronic use for at least 30 minutes prior to bed time. Cell phone screens, tablets and TVs imitate daylight, so your brain can become confused on the time of day. No caffeine use in the late afternoon and evenings.  o Obtained a written consent for LEESA query. Discussed the risk and benefits of the use of controlled substances with the patient, including the risk of tolerance and drug dependence. The patient has been counseled on the need to have an exit strategy, including potentially discontinuing the use of controlled substances. LEESA has or will be reviewed as soon as it becomes avaliable.  o Patient was educated/instructed on their diagnosis, treatment and medications prior to discharge from the clinic today.  o Call the office with any concerns or questions.  · Disposition  o Return to Office in 3 months.            Electronically Signed by: GUNJAN Malloy -Author on March 31, 2021 08:22:20 AM

## 2021-05-14 NOTE — PROGRESS NOTES
Progress Note      Patient Name: Jeimy Willard   Patient ID: [id number]   Sex: Female   YOB: 1955    Primary Care Provider: Jeaneth HOLLINS   Referring Provider: Jeaneth HOLLINS    Visit Date: January 4, 2021    Provider: GUNJAN Malloy   Location: [facility]   Location Address: [address] [place] [address]  [id number]   Location Phone: [phone]          Chief Complaint  · 3 Month Follow up for HTN, Insomnia, GERD, Anemia, and Vitamin D Deficiency      History Of Present Illness  Jeimy Willard is a 65 year old /White female who presents for evaluation and treatment of:      3 Month Follow up for HTN, Insomnia, GERD, Anemia, and Vitamin D Deficiency  Last lab work done 9/18/20  Med Refills needed    UDS- 10/5/20  Narcotic Consent- 10/5/20    GERD - controlled mostly. Pt does take food with pain meds to help prevent flaring.     HTN - well controlled.     Insomnia - pt is sleeping well. Pt is getting approx. 7-8 hours most nights.     flu shot- 9/26/20  Pap-10/2018  Dexa- 2/2018  Mammo- 2019 [facility]  Colon- 2/20/2019 Repeat in 3-5 yrs       Past Medical History  Disease Name Date Onset Notes   Anemia --  --    Arthritis --  --    Bone Density Screening 2/5/18 --    Cancer --  --    GERD (gastroesophageal reflux disease) --  --    Hypertension --  --    Insomnia --  --    Lymphoma, Malignant 1999 --    Osteoporosis --  --    Pap smear for cervical cancer screening 10/2018 --    Screening Mammogram 8/20/20 [facility]- 2019   Seasonal allergies --  --    Stomach Disorder --  --    Stomach Neoplasm, Malignant 2005 --    Ulcer --  --    Vitamin D deficiency --  --          Past Surgical History  Procedure Name Date Notes   Cholecystecomy --  --    Colonoscopy 2/20/19 02/20/2019 - Polyp (6mm) in ascending colon, Polyp (2mm) in sigmoid colon - Polypectomy, Mild diverticulosis of sigmoid colon, Grade 1 internal hemorrhoids. Repeat in 3-5 yrs    EGD  03/13/2020 Normal mucosa in the whole esophagus, Nodular tissue visualized at the esophago-jejunal anastomosis, Afferent and efferent small bowel limbs visualized. Patchy erythema and single erosion visualized in one jejunal limb. No gastric tissue visualized.   Gallbladder --  --    Gastrectomy, laparoscopic --  --          Medication List  Name Date Started Instructions   acetaminophen 325 mg oral tablet 10/05/2020 take 1 - 2 tablets (325 - 650 mg) by oral route every 4-6 hours as needed for 90 days   Ambien CR 6.25 mg oral tablet,ext release multiphase 10/05/2020 take 1 tablet (6.25 mg) by oral route once daily at bedtime for 30 days   celecoxib 200 mg oral capsule 01/04/2021 take 1 capsule (200 mg) by oral route once daily for 90 days   cyanocobalamin (vitamin B-12) 1,000 mcg/mL injection solution 01/04/2021 inject 1 milliliter (1,000 mcg) by intramuscular route once a month   folic acid 1 mg oral tablet 01/04/2021 take 1 tablet (1 mg) by oral route once daily for 90 days   lisinopril 10 mg oral tablet 01/04/2021 take 1 tablet (10 mg) by oral route once daily for 90 days   Miracle Cream 0 01/15/2020 clotrimazole/zinc/hydrocortisone apply to affected area tid   Norco 7.5-325 mg oral tablet 12/17/2020 take 1-2 tablets by oral route every 4 to 6 hours as needed   olopatadine 0.1 % ophthalmic (eye) drops  instill 1 drop into affected eye(s) by ophthalmic route 2 times per day at an interval of 6 to 8 hours   pantoprazole 40 mg oral tablet,delayed release (DR/EC) 01/04/2021 take 1 tablet (40 mg) by oral route once daily in the morning   Premarin 0.625 mg/gram vaginal cream 01/04/2021 insert one-fourth applicatorful (0.5 gram) by vaginal route twice weekly   PreserVision AREDS 7,160-113-100 unit-mg-unit oral tablet  take 1 tablet by oral route 2 times a day   Tums oral  --    Vitamin B-6 oral  --    Vitamin D3 125 mcg (5,000 unit) oral tablet  take 1 tablet by oral route 2 times a day   Vitron-C 65 mg iron- 125 mg  oral tablet,delayed release (DR/EC)  take 1 tablet by oral route daily   Voltaren 1 % topical gel 01/04/2021 apply 2 grmas to left knee by topical route QID         Allergy List  Allergen Name Date Reaction Notes   NO KNOWN DRUG ALLERGIES --  --  --          Family Medical History  Disease Name Relative/Age Notes   Breast Neoplasm, Malignant Mother/   --    Lung Neoplasm, Malignant Grandmother (maternal)/   --    Stroke Mother/   Mother   Heart Disease Brother/  Mother/   Mother; Brother   Hypertension Brother/  Mother/   --    Cancer, Unspecified Brother/  Mother/   Mother; Brother   Diabetes, unspecified type Brother/  Father/  Mother/   Mother; Father; Brother   Alzheimer's Disease Grandfather (maternal)/   --    - No Family History of Colorectal Cancer  --    Renal failure Mother/   --    Diabetes Brother/  Father/  Mother/   --    Esophagus Neoplasm, Malignant Grandmother (paternal)/   --    Family history of Arthritis Mother/   Mother         Social History  Finding Status Start/Stop Quantity Notes   Alcohol Never --/-- --  --    lives with spouse --  --/-- --  --    . --  --/-- --  --    Recreational Drug Use Never --/-- --  no   Retired. --  --/-- --  --    Tobacco Never --/-- --  never smoker         Immunizations  NameDate Admin Mfg Trade Name Lot Number Route Inj VIS Given VIS Publication   Mwqhadrms06/26/2020 NE Not Entered  NE NE     Comments: Pt reported, administered at pharmacy         Review of Systems  · Constitutional  o Admits  o : weight loss  o Denies  o : fatigue, fever, weight gain, chills  · Cardiovascular  o Denies  o : chest Pain, palpitations, edema (swelling)  · Respiratory  o Denies  o : frequent cough, shortness of breath  · Gastrointestinal  o Denies  o : nausea, vomiting, changes in bowel habits  · Genitourinary  o Denies  o : dysuria, urinary frequency, urinary urgency, polyuria  · Neurologic  o Denies  o : headache, tingling or numbness,  "dizziness  · Musculoskeletal  o Admits  o : knee pain  o Denies  o : joint pain, myalgias  · Endocrine  o Denies  o : polydipsia, polyphagia  · Psychiatric  o Admits  o : difficulty sleeping  o Denies  o : sleep distrubances, mood changes, memory changes, SI/HI      Vitals  Date Time BP Position Site L\R Cuff Size HR RR TEMP (F) WT  HT  BMI kg/m2 BSA m2 O2 Sat FR L/min FiO2 HC       10/05/2020 07:33 /70 Sitting    82 - R 24 98.1 210lbs 0oz 5'  3\" 37.2 2.06 96 %      11/11/2020 02:04 /63 Sitting    100 - R 24 98.8  5'  3\"   97 %      01/04/2021 08:23 /78 Sitting    94 - R 18 98.6 201lbs 6oz 5'  2.5\" 36.24 2.01 97 %            Physical Examination  · Constitutional  o Appearance  o : well-nourished, in no acute distress  · Eyes  o Conjunctivae  o : conjunctivae normal  o Sclerae  o : sclerae white  o Pupils and Irises  o : pupils equal and round  o Eyelids/Ocular Adnexae  o : eyelid appearance normal, no exudates present  · Neck  o Inspection/Palpation  o : normal appearance, no masses or tenderness, trachea midline  o Range of Motion  o : cervical range of motion within normal limits  o Thyroid  o : gland size normal, nontender, no nodules or masses present on palpation  · Respiratory  o Respiratory Effort  o : breathing unlabored  o Inspection of Chest  o : normal appearance  o Auscultation of Lungs  o : normal breath sounds throughout inspiration and expiration  · Cardiovascular  o Heart  o :   § Auscultation of Heart  § : regular rate and rhythm, no murmurs, gallops or rubs  o Peripheral Vascular System  o :   § Carotid Arteries  § : normal pulses bilaterally, no bruits present  § Extremities  § : no clubbing or edema  · Gastrointestinal  o Abdominal Examination  o : abdomen nontender to palpation, tone normal without rigidity or guarding, no masses present, bowel sounds present  · Skin and Subcutaneous Tissue  o General Inspection  o : no rashes or lesions present, no areas of " discoloration  o Body Hair  o : hair normal for age, general body hair distribution normal for age  o Digits and Nails  o : no clubbing, cyanosis, deformities or edema present, normal appearing nails  · Neurologic  o Mental Status Examination  o :   § Orientation  § : grossly oriented to person, place and time  o Gait and Station  o : normal gait, able to stand without difficulty  · Psychiatric  o Judgement and Insight  o : judgment and insight intact  o Mood and Affect  o : mood normal, affect appropriate          Assessment  · Anemia     285.9/D64.9  · Essential hypertension     401.9/I10  · GERD (gastroesophageal reflux disease)     530.81/K21.9  · Insomnia, unspecified     780.52/G47.00  · Vitamin D deficiency     268.9/E55.9      Plan  · Orders  o B12 Folate levels (B12FO) - 285.9/D64.9 - 04/04/2021  o Iron panel (iron, TIBC, transferrin saturation) (33750, 17476, 82353) - 285.9/D64.9 - 04/04/2021  o Physical, Primary Care Panel (CBC, CMP, Lipid, TSH) [facility] (04737, 11925, 92336, 13216) - - 04/04/2021  o LEESA Report (KASPR) - - 01/04/2021  · Medications  o Ambien CR 6.25 mg oral tablet,ext release multiphase   SIG: take 1 tablet (6.25 mg) by oral route once daily at bedtime for 30 days   DISP: (30) Tablet with 2 refills  Refilled on 01/04/2021     o celecoxib 200 mg oral capsule   SIG: take 1 capsule (200 mg) by oral route once daily for 90 days   DISP: (90) Capsule with 1 refills  Refilled on 01/04/2021     o cyanocobalamin (vitamin B-12) 1,000 mcg/mL injection solution   SIG: inject 1 milliliter (1,000 mcg) by intramuscular route once a month   DISP: (3) Vial with 1 refills  Refilled on 01/04/2021     o folic acid 1 mg oral tablet   SIG: take 1 tablet (1 mg) by oral route once daily for 90 days   DISP: (90) Tablet with 1 refills  Refilled on 01/04/2021     o lisinopril 10 mg oral tablet   SIG: take 1 tablet (10 mg) by oral route once daily for 90 days   DISP: (90) Tablet with 1 refills  Refilled on 01/04/2021      o pantoprazole 40 mg oral tablet,delayed release (DR/EC)   SIG: take 1 tablet (40 mg) by oral route once daily in the morning   DISP: (90) Tablet with 1 refills  Refilled on 01/04/2021     o Premarin 0.625 mg/gram vaginal cream   SIG: insert one-fourth applicatorful (0.5 gram) by vaginal route twice weekly   DISP: (1) Tube with 5 refills  Refilled on 01/04/2021     o Voltaren 1 % topical gel   SIG: apply 2 grmas to left knee by topical route QID   DISP: (1) Tube with 1 refills  Refilled on 01/04/2021     o Medications have been Reconciled  o Transition of Care or Provider Policy  · Instructions  o Patient advised to monitor blood pressure (B/P) at home and journal readings. Patient informed that a B/P reading at home of more than 130/80 is considered hypertension. For readings greater wtqr910/90 or higher patient is advised to follow up in the office with readings for management. Patient advised to limit sodium intake.  o Maintain a healthy weight. Avoid tight fitting clothes. Avoid fried, fatty foods, tomato sauce, chocolate, mint, garlic, onion, alcohol. caffeine. Eat smaller meals, dont lie down after a meal, dont smoke. Elevate the head of your bed 6-9 inches.  o Obtained a written consent for LEESA query. Discussed the risk and benefits of the use of controlled substances with the patient, including the risk of tolerance and drug dependence. The patient has been counseled on the need to have an exit strategy, including potentially discontinuing the use of controlled substances. LEESA has or will be reviewed as soon as it becomes avaliable.  o Patient was educated/instructed on their diagnosis, treatment and medications prior to discharge from the clinic today.  o Call the office with any concerns or questions.  · Disposition  o Return to Office in 3 months.            Electronically Signed by: GUNJAN Malloy -Author on January 4, 2021 08:49:10 AM

## 2021-05-14 NOTE — PROGRESS NOTES
Progress Note      Patient Name: Jeimy Willard   Patient ID: [id number]   Sex: Female   YOB: 1955    Primary Care Provider: Jeaneth HOLLINS   Referring Provider: Jeaneth HOLLINS    Visit Date: December 21, 2020    Provider: Arnulfo Burnette PA-C   Location: [facility]   Location Address: [address] [place] [address]  [id number]   Location Phone: [phone]          Chief Complaint  · Left TKA      History Of Present Illness  Jiemy Willard is a 65 year old /White female who presents today to [facility]. Patient presents with her  for follow-up evaluation of left total knee arthroplasty, 11/4/2020. Patient states she is doing well, she is attending physical therapy 2 times per week, she states pain is controlled with her pain medication, she takes it only when she has physical therapy and sometimes at night. Patient finished her Eliquis which she states she took a longer dose due to inability to take aspirin. Patient states she is happy with her surgery results, has increased range of motion and strength. No new complaints today.       Past Medical History  Anemia; Arthritis; Bone Density Screening; Cancer; GERD (gastroesophageal reflux disease); Hypertension; Insomnia; Lymphoma, Malignant; Osteoporosis; Pap smear for cervical cancer screening; Screening Mammogram; Seasonal allergies; Stomach Disorder; Stomach Neoplasm, Malignant; Ulcer; Vitamin D deficiency         Past Surgical History  Cholecystecomy; Colonoscopy; EGD; Gallbladder; Gastrectomy, laparoscopic         Medication List  acetaminophen 325 mg oral tablet; Ambien CR 6.25 mg oral tablet,ext release multiphase; celecoxib 200 mg oral capsule; cyanocobalamin (vitamin B-12) 1,000 mcg/mL injection solution; folic acid 1 mg oral tablet; hydrocodone-acetaminophen 7.5-325 mg oral tablet; lisinopril 10 mg oral tablet; Miracle Cream 0; Norco 7.5-325 mg oral tablet; olopatadine 0.1 % ophthalmic (eye)  "drops; pantoprazole 40 mg oral tablet,delayed release (DR/EC); Premarin 0.625 mg/gram vaginal cream; PreserVision AREDS 7,160-113-100 unit-mg-unit oral tablet; Tums oral; Vitamin B-6 oral; Vitamin D3 125 mcg (5,000 unit) oral tablet; Vitron-C 65 mg iron- 125 mg oral tablet,delayed release (DR/EC); Voltaren 1 % topical gel         Allergy List  NO KNOWN DRUG ALLERGIES       Allergies Reconciled  Family Medical History  Breast Neoplasm, Malignant; Lung Neoplasm, Malignant; Stroke; Heart Disease; Hypertension; Cancer, Unspecified; Diabetes, unspecified type; Alzheimer's Disease; - No Family History of Colorectal Cancer; Renal failure; Diabetes; Esophagus Neoplasm, Malignant; Family history of Arthritis         Social History  Alcohol (Never); lives with spouse; .; Recreational Drug Use (Never); Retired.; Tobacco (Never)         Immunizations  Name Date Admin   Influenza 09/26/2020   Influenza 09/17/2019         Review of Systems  · Constitutional  o Denies  o : fever, chills, weight loss  · Cardiovascular  o Denies  o : chest pain, shortness of breath  · Gastrointestinal  o Denies  o : liver disease, heartburn, nausea, blood in stools  · Genitourinary  o Denies  o : painful urination, blood in urine  · Integument  o Denies  o : rash, itching  · Neurologic  o Denies  o : headache, weakness, loss of consciousness  · Musculoskeletal  o Denies  o : painful, swollen joints  · Psychiatric  o Denies  o : drug/alcohol addiction, anxiety, depression      Vitals  Date Time BP Position Site L\R Cuff Size HR RR TEMP (F) WT  HT  BMI kg/m2 BSA m2 O2 Sat FR L/min FiO2        12/21/2020 09:23 AM      115 - R   204lbs 6oz 5'  2.5\" 36.78 2.02 98 %            Physical Examination  · Constitutional  o Appearance  o : well developed, well-nourished, no obvious deformities present  · Head and Face  o Head  o :   § Inspection  § : normocephalic  o Face  o :   § Inspection  § : no facial lesions  · Eyes  o Conjunctivae  o : " conjunctivae normal  o Sclerae  o : sclerae white  · Ears, Nose, Mouth and Throat  o Ears  o :   § External Ears  § : appearance within normal limits  § Hearing  § : intact  o Nose  o :   § External Nose  § : appearance normal  · Neck  o Inspection/Palpation  o : normal appearance  o Range of Motion  o : full range of motion  · Respiratory  o Respiratory Effort  o : breathing unlabored  o Inspection of Chest  o : normal appearance  o Auscultation of Lungs  o : no audible wheezing or rales  · Cardiovascular  o Heart  o : regular rate  · Gastrointestinal  o Abdominal Examination  o : soft and non-tender  · Skin and Subcutaneous Tissue  o General Inspection  o : intact, no rashes  · Psychiatric  o General  o : Alert and oriented x3  o Judgement and Insight  o : judgment and insight intact  o Mood and Affect  o : mood normal, affect appropriate  · Left Knee  o Inspection  o : Incision is well-healed, no erythema, no ecchymosis, no swelling, no signs of infection. No effusion. Extension -2, flexion 115, nontender calf, negative Homans' sign. Stable to varus/valgus stress, stable anterior/posterior drawer.          Assessment  · Aftercare following surgery of left total knee arthroplasty, 11/4/2020     V54.81  · Left knee pain, unspecified chronicity     719.46/M25.562      Plan  · Medications  o Medications have been Reconciled  o Transition of Care or Provider Policy  · Instructions  o Reviewed the patient's Past Medical, Social, and Family history as well as the ROS at today's visit, no changes.  o Call or return if worsening symptoms.  o Follow up in 6 weeks.  o Advised patient to continue physical therapy, continue activity as tolerated, return if any new or concerning symptoms occur, otherwise follow-up in 6 weeks with x-rays.  · Referrals  o ID: [id number] Date: 07/14/2020 Type: Inbound  Specialty: Orthopedic Surgery            Electronically Signed by: NICOLA HernandezC -Author on December 21, 2020 09:51:17  AM  Electronically Co-signed by: Chuck Valdez MD -Reviewer on December 21, 2020 07:08:38 PM

## 2021-05-14 NOTE — PROGRESS NOTES
Progress Note      Patient Name: Jeimy Willard   Patient ID: [id number]   Sex: Female   YOB: 1955    Primary Care Provider: Jeaneth HOLLINS   Referring Provider: Jeaneth HOLLINS    Visit Date: February 1, 2021    Provider: Arnulfo Burnette PA-C   Location: [facility]   Location Address: [address] [place] [address]          Chief Complaint  · Left TKA      History Of Present Illness  Jeimy Willard is a 65 year old /White female who presents today to [facility]. Patient presents for follow-up evaluation of left total knee arthroplasty, 11/4/2020. Patient states she is doing well, she states the knee does not cause her pain like it did prior to her surgery, she states she has continued physical therapy 2 times per week, patient states that she has progressed to going up and down stairs normally,, she does state going downstairs she is still uses her rail for support for safety. Patient states she takes pain medication before she goes to physical therapy, patient admits to stiffness in the knee when she sits for long periods. Patient cannot take NSAIDs, she can take Tylenol. Patient denies swelling, denies difficulty with range of motion.       Past Medical History  Anemia; Arthritis; Bone Density Screening; Cancer; GERD (gastroesophageal reflux disease); Hypertension; Insomnia; Lymphoma, Malignant; Osteoporosis; Pap smear for cervical cancer screening; Screening Mammogram; Seasonal allergies; Stomach Disorder; Stomach Neoplasm, Malignant; Ulcer; Vitamin D deficiency         Past Surgical History  Cholecystecomy; Colonoscopy; EGD; Gallbladder; Gastrectomy, laparoscopic; Total knee replacement         Medication List  acetaminophen 325 mg oral tablet; Ambien CR 6.25 mg oral tablet,ext release multiphase; celecoxib 200 mg oral capsule; cyanocobalamin (vitamin B-12) 1,000 mcg/mL injection solution; folic acid 1 mg oral tablet; lisinopril 10 mg  "oral tablet; loratadine 10 mg oral tablet; Miracle Cream 0; Norco 7.5-325 mg oral tablet; olopatadine 0.1 % ophthalmic (eye) drops; pantoprazole 40 mg oral tablet,delayed release (DR/EC); Premarin 0.625 mg/gram vaginal cream; PreserVision AREDS 7,160-113-100 unit-mg-unit oral tablet; Tums oral; Vitamin B-6 oral; Vitamin D3 125 mcg (5,000 unit) oral tablet; Vitron-C 65 mg iron- 125 mg oral tablet,delayed release (DR/EC); Voltaren 1 % topical gel         Allergy List  NO KNOWN DRUG ALLERGIES       Allergies Reconciled  Family Medical History  Breast Neoplasm, Malignant; Lung Neoplasm, Malignant; Stroke; Heart Disease; Hypertension; Cancer, Unspecified; Diabetes, unspecified type; Alzheimer's Disease; - No Family History of Colorectal Cancer; Renal failure; Diabetes; Esophagus Neoplasm, Malignant; Family history of Arthritis         Social History  Alcohol (Never); lives with spouse; .; Recreational Drug Use (Never); Retired.; Tobacco (Never)         Immunizations  Name Date Admin   Influenza 09/26/2020   Influenza 09/17/2019         Review of Systems  · Constitutional  o Denies  o : fever, chills, weight loss  · Cardiovascular  o Denies  o : chest pain, shortness of breath  · Gastrointestinal  o Denies  o : liver disease, heartburn, nausea, blood in stools  · Genitourinary  o Denies  o : painful urination, blood in urine  · Integument  o Denies  o : rash, itching  · Neurologic  o Denies  o : headache, weakness, loss of consciousness  · Musculoskeletal  o Denies  o : painful, swollen joints  · Psychiatric  o Denies  o : drug/alcohol addiction, anxiety, depression      Vitals  Date Time BP Position Site L\R Cuff Size HR RR TEMP (F) WT  HT  BMI kg/m2 BSA m2 O2 Sat FR L/min FiO2 HC       02/01/2021 08:56 AM      103 - R   202lbs 2oz 5'  2.5\" 36.38 2.01 98 %            Physical Examination  · Constitutional  o Appearance  o : well developed, well-nourished, no obvious deformities present  · Head and " Face  o Head  o :   § Inspection  § : normocephalic  o Face  o :   § Inspection  § : no facial lesions  · Eyes  o Conjunctivae  o : conjunctivae normal  o Sclerae  o : sclerae white  · Ears, Nose, Mouth and Throat  o Ears  o :   § External Ears  § : appearance within normal limits  § Hearing  § : intact  o Nose  o :   § External Nose  § : appearance normal  · Neck  o Inspection/Palpation  o : normal appearance  o Range of Motion  o : full range of motion  · Respiratory  o Respiratory Effort  o : breathing unlabored  o Inspection of Chest  o : normal appearance  o Auscultation of Lungs  o : no audible wheezing or rales  · Cardiovascular  o Heart  o : regular rate  · Gastrointestinal  o Abdominal Examination  o : soft and non-tender  · Skin and Subcutaneous Tissue  o General Inspection  o : intact, no rashes  · Psychiatric  o General  o : Alert and oriented x3  o Judgement and Insight  o : judgment and insight intact  o Mood and Affect  o : mood normal, affect appropriate  · Left Knee  o Inspection  o : Incision is well-healed, no erythema, no swelling, no ecchymosis, no effusion, no signs of infection, nontender to palpation. Full extension, flexion 120, stable anterior/posterior drawer, stable to varus/valgus stress.  · In Office Procedures  o View  o : AP/LATERAL/SUNRISE  o Site  o : left, knee  o Indication  o : Left knee pain  o Xray  o : Intact appearing left knee replacement, normal alignment, no evidence of subsidence, loosening or periprosthetic fracture.  o Comparative Data  o : No comparative data found          Assessment  · Aftercare following surgery of left total knee arthroplasty, 11/4/2020     V54.81  · Left knee pain, unspecified chronicity     719.46/M25.562      Plan  · Orders  o Knee (Left) [facility] Preferred View ([id number]) - 719.46/M25.562 - 02/01/2021  · Medications  o Medications have been Reconciled  o Transition of Care or Provider Policy  · Instructions  o Reviewed the patient's Past Medical,  Social, and Family history as well as the ROS at today's visit, no changes.  o Call or return if worsening symptoms.  o Follow up in 3 months.  o Reviewed x-rays with the patient, advised her to continue activity as tolerated, follow-up in 3 months for reevaluation and x-rays.  · Referrals  o ID: [id number] Date: 07/14/2020 Type: Inbound  Specialty: Orthopedic Surgery            Electronically Signed by: Arnulfo Burnette PA-C -Author on February 1, 2021 09:56:29 AM  Electronically Co-signed by: Chuck Valdez MD -Reviewer on February 1, 2021 09:26:08 PM

## 2021-05-15 VITALS — BODY MASS INDEX: 36.5 KG/M2 | WEIGHT: 206 LBS | HEIGHT: 63 IN

## 2021-05-15 VITALS
BODY MASS INDEX: 36.57 KG/M2 | WEIGHT: 206.37 LBS | HEIGHT: 63 IN | SYSTOLIC BLOOD PRESSURE: 141 MMHG | DIASTOLIC BLOOD PRESSURE: 73 MMHG

## 2021-05-15 VITALS — HEIGHT: 63 IN | WEIGHT: 205 LBS | BODY MASS INDEX: 36.32 KG/M2 | OXYGEN SATURATION: 97 % | HEART RATE: 77 BPM

## 2021-05-15 VITALS
OXYGEN SATURATION: 99 % | TEMPERATURE: 98.1 F | WEIGHT: 202 LBS | RESPIRATION RATE: 28 BRPM | SYSTOLIC BLOOD PRESSURE: 143 MMHG | HEART RATE: 88 BPM | BODY MASS INDEX: 35.79 KG/M2 | HEIGHT: 63 IN | DIASTOLIC BLOOD PRESSURE: 85 MMHG

## 2021-05-15 VITALS — BODY MASS INDEX: 36.5 KG/M2 | HEIGHT: 63 IN | HEART RATE: 99 BPM | WEIGHT: 206 LBS | OXYGEN SATURATION: 99 %

## 2021-05-15 VITALS
RESPIRATION RATE: 20 BRPM | OXYGEN SATURATION: 100 % | BODY MASS INDEX: 36.32 KG/M2 | HEART RATE: 78 BPM | DIASTOLIC BLOOD PRESSURE: 74 MMHG | HEIGHT: 63 IN | SYSTOLIC BLOOD PRESSURE: 130 MMHG | WEIGHT: 205 LBS

## 2021-05-15 VITALS
HEART RATE: 90 BPM | SYSTOLIC BLOOD PRESSURE: 118 MMHG | DIASTOLIC BLOOD PRESSURE: 81 MMHG | HEIGHT: 63 IN | RESPIRATION RATE: 18 BRPM | OXYGEN SATURATION: 98 % | WEIGHT: 205.5 LBS | BODY MASS INDEX: 36.41 KG/M2

## 2021-05-15 VITALS
HEART RATE: 87 BPM | WEIGHT: 210 LBS | SYSTOLIC BLOOD PRESSURE: 138 MMHG | BODY MASS INDEX: 37.21 KG/M2 | OXYGEN SATURATION: 97 % | RESPIRATION RATE: 23 BRPM | DIASTOLIC BLOOD PRESSURE: 62 MMHG | TEMPERATURE: 97.8 F | HEIGHT: 63 IN

## 2021-05-15 VITALS — HEIGHT: 63 IN | OXYGEN SATURATION: 98 % | WEIGHT: 205.5 LBS | BODY MASS INDEX: 36.41 KG/M2 | HEART RATE: 93 BPM

## 2021-05-15 VITALS
WEIGHT: 205 LBS | OXYGEN SATURATION: 97 % | DIASTOLIC BLOOD PRESSURE: 81 MMHG | RESPIRATION RATE: 18 BRPM | BODY MASS INDEX: 36.32 KG/M2 | SYSTOLIC BLOOD PRESSURE: 110 MMHG | HEART RATE: 98 BPM | HEIGHT: 63 IN

## 2021-06-23 PROBLEM — G47.00 INSOMNIA: Status: ACTIVE | Noted: 2021-06-23

## 2021-06-23 PROBLEM — K31.9 STOMACH DISORDER: Status: ACTIVE | Noted: 2021-06-23

## 2021-06-23 PROBLEM — E55.9 VITAMIN D DEFICIENCY: Status: ACTIVE | Noted: 2021-06-23

## 2021-06-23 PROBLEM — C80.1 CANCER: Status: ACTIVE | Noted: 2021-06-23

## 2021-06-23 PROBLEM — D64.9 ANEMIA: Status: ACTIVE | Noted: 2021-06-23

## 2021-06-23 PROBLEM — I10 HYPERTENSION: Status: ACTIVE | Noted: 2021-06-23

## 2021-06-23 PROBLEM — IMO0002 ULCERATIVE LESION: Status: ACTIVE | Noted: 2021-06-23

## 2021-06-23 PROBLEM — K21.9 GERD (GASTROESOPHAGEAL REFLUX DISEASE): Status: ACTIVE | Noted: 2021-06-23

## 2021-06-23 PROBLEM — M19.90 ARTHRITIS: Status: ACTIVE | Noted: 2021-06-23

## 2021-06-23 PROBLEM — M81.0 OSTEOPOROSIS: Status: ACTIVE | Noted: 2021-06-23

## 2021-06-23 PROBLEM — J30.2 SEASONAL ALLERGIC RHINITIS: Status: ACTIVE | Noted: 2021-06-23

## 2021-06-23 RX ORDER — FAMOTIDINE 40 MG/1
40 TABLET, FILM COATED ORAL DAILY
COMMUNITY
End: 2022-08-29 | Stop reason: SDUPTHER

## 2021-06-23 RX ORDER — CELECOXIB 200 MG/1
200 CAPSULE ORAL DAILY
COMMUNITY
End: 2021-09-17 | Stop reason: SDUPTHER

## 2021-06-23 RX ORDER — PANTOPRAZOLE SODIUM 40 MG/1
40 TABLET, DELAYED RELEASE ORAL DAILY
COMMUNITY
End: 2021-09-17 | Stop reason: SDUPTHER

## 2021-06-23 RX ORDER — ZOLPIDEM TARTRATE 6.25 MG/1
6.25 TABLET, FILM COATED, EXTENDED RELEASE ORAL NIGHTLY PRN
COMMUNITY
End: 2021-06-30 | Stop reason: SDUPTHER

## 2021-06-23 RX ORDER — LISINOPRIL 10 MG/1
10 TABLET ORAL DAILY
COMMUNITY
End: 2021-09-17 | Stop reason: SDUPTHER

## 2021-06-23 RX ORDER — OLOPATADINE HYDROCHLORIDE 1 MG/ML
1 SOLUTION/ DROPS OPHTHALMIC
COMMUNITY

## 2021-06-23 RX ORDER — CALCIUM CARBONATE 200(500)MG
TABLET,CHEWABLE ORAL
COMMUNITY
End: 2021-06-30 | Stop reason: SDUPTHER

## 2021-06-23 RX ORDER — ACETAMINOPHEN 325 MG/1
650 TABLET ORAL EVERY 6 HOURS PRN
COMMUNITY

## 2021-06-23 RX ORDER — FOLIC ACID 1 MG/1
1 TABLET ORAL DAILY
COMMUNITY
End: 2021-09-17 | Stop reason: SDUPTHER

## 2021-06-23 RX ORDER — IRON,CARBONYL/ASCORBIC ACID 65MG-125MG
TABLET, DELAYED RELEASE (ENTERIC COATED) ORAL
COMMUNITY

## 2021-06-30 ENCOUNTER — OFFICE VISIT (OUTPATIENT)
Dept: FAMILY MEDICINE CLINIC | Facility: CLINIC | Age: 66
End: 2021-06-30

## 2021-06-30 VITALS
SYSTOLIC BLOOD PRESSURE: 135 MMHG | TEMPERATURE: 97.9 F | HEIGHT: 63 IN | HEART RATE: 80 BPM | BODY MASS INDEX: 35.22 KG/M2 | WEIGHT: 198.8 LBS | RESPIRATION RATE: 18 BRPM | DIASTOLIC BLOOD PRESSURE: 77 MMHG | OXYGEN SATURATION: 98 %

## 2021-06-30 DIAGNOSIS — Z12.31 VISIT FOR SCREENING MAMMOGRAM: ICD-10-CM

## 2021-06-30 DIAGNOSIS — F51.01 PRIMARY INSOMNIA: Primary | ICD-10-CM

## 2021-06-30 DIAGNOSIS — K21.9 GASTROESOPHAGEAL REFLUX DISEASE WITHOUT ESOPHAGITIS: ICD-10-CM

## 2021-06-30 DIAGNOSIS — M25.562 CHRONIC PAIN OF LEFT KNEE: ICD-10-CM

## 2021-06-30 DIAGNOSIS — G89.29 CHRONIC PAIN OF LEFT KNEE: ICD-10-CM

## 2021-06-30 LAB
AMPHET+METHAMPHET UR QL: NEGATIVE
BARBITURATES UR QL SCN: NEGATIVE
BENZODIAZ UR QL SCN: NEGATIVE
CANNABINOIDS SERPL QL: POSITIVE
COCAINE UR QL: NEGATIVE
METHADONE UR QL SCN: NEGATIVE
OPIATES UR QL: NEGATIVE
OXYCODONE UR QL SCN: NEGATIVE

## 2021-06-30 PROCEDURE — 80307 DRUG TEST PRSMV CHEM ANLYZR: CPT | Performed by: NURSE PRACTITIONER

## 2021-06-30 PROCEDURE — 99213 OFFICE O/P EST LOW 20 MIN: CPT | Performed by: NURSE PRACTITIONER

## 2021-06-30 RX ORDER — ZOLPIDEM TARTRATE 6.25 MG/1
6.25 TABLET, FILM COATED, EXTENDED RELEASE ORAL NIGHTLY PRN
Qty: 90 TABLET | Refills: 0 | Status: SHIPPED | OUTPATIENT
Start: 2021-06-30 | End: 2021-09-17 | Stop reason: SDUPTHER

## 2021-06-30 RX ORDER — CONJUGATED ESTROGENS 0.62 MG/G
1 CREAM VAGINAL AS NEEDED
COMMUNITY
Start: 2021-05-26 | End: 2021-09-17 | Stop reason: SDUPTHER

## 2021-06-30 RX ORDER — LORATADINE 10 MG/1
1 TABLET ORAL DAILY
COMMUNITY
Start: 2021-06-08 | End: 2021-09-17 | Stop reason: SDUPTHER

## 2021-06-30 NOTE — PATIENT INSTRUCTIONS
Insomnia  Insomnia is a sleep disorder that makes it difficult to fall asleep or stay asleep. Insomnia can cause fatigue, low energy, difficulty concentrating, mood swings, and poor performance at work or school.  There are three different ways to classify insomnia:  · Difficulty falling asleep.  · Difficulty staying asleep.  · Waking up too early in the morning.  Any type of insomnia can be long-term (chronic) or short-term (acute). Both are common. Short-term insomnia usually lasts for three months or less. Chronic insomnia occurs at least three times a week for longer than three months.  What are the causes?  Insomnia may be caused by another condition, situation, or substance, such as:  · Anxiety.  · Certain medicines.  · Gastroesophageal reflux disease (GERD) or other gastrointestinal conditions.  · Asthma or other breathing conditions.  · Restless legs syndrome, sleep apnea, or other sleep disorders.  · Chronic pain.  · Menopause.  · Stroke.  · Abuse of alcohol, tobacco, or illegal drugs.  · Mental health conditions, such as depression.  · Caffeine.  · Neurological disorders, such as Alzheimer's disease.  · An overactive thyroid (hyperthyroidism).  Sometimes, the cause of insomnia may not be known.  What increases the risk?  Risk factors for insomnia include:  · Gender. Women are affected more often than men.  · Age. Insomnia is more common as you get older.  · Stress.  · Lack of exercise.  · Irregular work schedule or working night shifts.  · Traveling between different time zones.  · Certain medical and mental health conditions.  What are the signs or symptoms?  If you have insomnia, the main symptom is having trouble falling asleep or having trouble staying asleep. This may lead to other symptoms, such as:  · Feeling fatigued or having low energy.  · Feeling nervous about going to sleep.  · Not feeling rested in the morning.  · Having trouble concentrating.  · Feeling irritable, anxious, or depressed.  How  is this diagnosed?  This condition may be diagnosed based on:  · Your symptoms and medical history. Your health care provider may ask about:  ? Your sleep habits.  ? Any medical conditions you have.  ? Your mental health.  · A physical exam.  How is this treated?  Treatment for insomnia depends on the cause. Treatment may focus on treating an underlying condition that is causing insomnia. Treatment may also include:  · Medicines to help you sleep.  · Counseling or therapy.  · Lifestyle adjustments to help you sleep better.  Follow these instructions at home:  Eating and drinking    · Limit or avoid alcohol, caffeinated beverages, and cigarettes, especially close to bedtime. These can disrupt your sleep.  · Do not eat a large meal or eat spicy foods right before bedtime. This can lead to digestive discomfort that can make it hard for you to sleep.  Sleep habits    · Keep a sleep diary to help you and your health care provider figure out what could be causing your insomnia. Write down:  ? When you sleep.  ? When you wake up during the night.  ? How well you sleep.  ? How rested you feel the next day.  ? Any side effects of medicines you are taking.  ? What you eat and drink.  · Make your bedroom a dark, comfortable place where it is easy to fall asleep.  ? Put up shades or blackout curtains to block light from outside.  ? Use a white noise machine to block noise.  ? Keep the temperature cool.  · Limit screen use before bedtime. This includes:  ? Watching TV.  ? Using your smartphone, tablet, or computer.  · Stick to a routine that includes going to bed and waking up at the same times every day and night. This can help you fall asleep faster. Consider making a quiet activity, such as reading, part of your nighttime routine.  · Try to avoid taking naps during the day so that you sleep better at night.  · Get out of bed if you are still awake after 15 minutes of trying to sleep. Keep the lights down, but try reading or  doing a quiet activity. When you feel sleepy, go back to bed.  General instructions  · Take over-the-counter and prescription medicines only as told by your health care provider.  · Exercise regularly, as told by your health care provider. Avoid exercise starting several hours before bedtime.  · Use relaxation techniques to manage stress. Ask your health care provider to suggest some techniques that may work well for you. These may include:  ? Breathing exercises.  ? Routines to release muscle tension.  ? Visualizing peaceful scenes.  · Make sure that you drive carefully. Avoid driving if you feel very sleepy.  · Keep all follow-up visits as told by your health care provider. This is important.  Contact a health care provider if:  · You are tired throughout the day.  · You have trouble in your daily routine due to sleepiness.  · You continue to have sleep problems, or your sleep problems get worse.  Get help right away if:  · You have serious thoughts about hurting yourself or someone else.  If you ever feel like you may hurt yourself or others, or have thoughts about taking your own life, get help right away. You can go to your nearest emergency department or call:  · Your local emergency services (911 in the [place]  · A suicide crisis helpline, such as the National Suicide Prevention Lifeline at 1-978.772.4280. This is open 24 hours a day.  Summary  · Insomnia is a sleep disorder that makes it difficult to fall asleep or stay asleep.  · Insomnia can be long-term (chronic) or short-term (acute).  · Treatment for insomnia depends on the cause. Treatment may focus on treating an underlying condition that is causing insomnia.  · Keep a sleep diary to help you and your health care provider figure out what could be causing your insomnia.  This information is not intended to replace advice given to you by your health care provider. Make sure you discuss any questions you have with your health care provider.  Document  Revised: 11/30/2018 Document Reviewed: 09/27/2018  Elsevier Patient Education © 2021 [employer]

## 2021-06-30 NOTE — PROGRESS NOTES
Chief Complaint  Arthritis, Cancer, and Med Refill    Subjective          Jeimy Willard is a 66 y.o. female who presents to [facility]  History of Present Illness    Gerd controlled with pepcid otc.    Ambien - sleeping well. Pt denies any med se. Pt is getting 7 hours of sleep per night.     Chronic left knee pain - pt requesting diclofenac gel that has helped in the past.     PHQ-2 Total Score: 0   PHQ-9 Total Score: 0       Review of Systems   Constitutional: Negative for chills, fatigue and fever.   Respiratory: Negative for cough and shortness of breath.    Cardiovascular: Negative for chest pain and palpitations.   Gastrointestinal: Negative for constipation, diarrhea, nausea and vomiting.   Musculoskeletal: Negative for neck pain and neck stiffness.        Knee pain left.   Skin: Negative for rash.   Neurological: Negative for dizziness and headaches.   Psychiatric/Behavioral: Positive for sleep disturbance. The patient is not nervous/anxious.           Medical History: has a past medical history of Anemia, Arthritis, Cancer (CMS/[facility]), GERD (gastroesophageal reflux disease), Hypertension, Insomnia, Lymphoma (CMS/[facility]) (1999), Osteoporosis, Seasonal allergies, Stomach cancer (CMS/[facility]) (2005), Stomach disorder, Ulcer (traumatic) of oral mucosa, and Vitamin D deficiency.     Surgical History: has a past surgical history that includes Cholecystectomy; Colonoscopy (02/20/2019); Esophagogastroduodenoscopy (03/13/2020); Gallbladder surgery; Laparoscopic total gastrectomy; and Replacement total knee (Left, 2020).     Family History: family history includes Alzheimer's disease in her maternal grandfather; Arthritis in her mother; Breast cancer in her mother; Cancer in her brother and mother; Diabetes in her brother, father, and mother; Esophageal cancer in her paternal grandmother; Heart disease in her brother and mother; Hypertension in her brother and mother; Kidney failure in her  mother; Lung cancer in her maternal grandmother; Stroke in her mother.     Social History: reports that she has never smoked. She has never used smokeless tobacco. She reports that she does not drink alcohol and does not use drugs.    Allergies: Patient has no known allergies.      Health Maintenance Due   Topic Date Due   • ANNUAL PHYSICAL  Never done   • TDAP/TD VACCINES (1 - Tdap) Never done   • ZOSTER VACCINE (1 of 2) Never done   • DXA SCAN  02/05/2020   • HEPATITIS C SCREENING  Never done   • PAP SMEAR  Never done            Current Outpatient Medications:   •  acetaminophen (TYLENOL) 325 MG tablet, Take 650 mg by mouth Every 6 (Six) Hours As Needed., Disp: , Rfl:   •  celecoxib (CeleBREX) 200 MG capsule, Take 200 mg by mouth Daily., Disp: , Rfl:   •  Diclofenac Sodium (VOLTAREN) 1 % gel gel, Apply 2 g topically to the appropriate area as directed 4 (Four) Times a Day As Needed (knee pain)., Disp: 100 g, Rfl: 1  •  estrogens, conjugated, (PREMARIN) 0.625 MG tablet, Take 0.625 mg by mouth Daily. Take daily for 21 days then do not take for 7 days., Disp: , Rfl:   •  famotidine (PEPCID) 40 MG tablet, Take 40 mg by mouth Daily., Disp: , Rfl:   •  folic acid (FOLVITE) 1 MG tablet, Take 1 mg by mouth Daily., Disp: , Rfl:   •  Iron-Vitamin C (Vitron-C)  MG tablet, Vitron-C 65 mg iron- 125 mg oral tablet,delayed release (DR/EC) take 1 tablet by oral route daily   Active, Disp: , Rfl:   •  lisinopril (PRINIVIL,ZESTRIL) 10 MG tablet, Take 10 mg by mouth Daily., Disp: , Rfl:   •  loratadine (CLARITIN) 10 MG tablet, Take 1 tablet by mouth Daily., Disp: , Rfl:   •  Loratadine-Pseudoephedrine (PX ALLERGY RELIEF D, LORATID, PO), Take  by mouth., Disp: , Rfl:   •  Multiple Vitamins-Minerals (PRESERVISION AREDS 2 PO), PreserVision AREDS 7,160-113-100 unit-mg-unit oral tablet take 1 tablet by oral route 2 times a day   Active, Disp: , Rfl:   •  olopatadine (PATANOL) 0.1 % ophthalmic solution, 1 drop., Disp: , Rfl:   •   "pantoprazole (PROTONIX) 40 MG EC tablet, Take 40 mg by mouth Daily., Disp: , Rfl:   •  Premarin 0.625 MG/GM vaginal cream, Insert 1 applicator into the vagina As Needed., Disp: , Rfl:   •  Pyridoxine HCl (VITAMIN B-6 PO), , Disp: , Rfl:   •  zolpidem CR (AMBIEN CR) 6.25 MG CR tablet, Take 1 tablet by mouth At Night As Needed for Sleep., Disp: 90 tablet, Rfl: 0  •  Calcium Carb-Cholecalciferol (Os-Nick Calcium + D3) 500-200 MG-UNIT tablet, Take 1 tablet by mouth 2 (two) times a day., Disp: 180 tablet, Rfl: 1      Immunization History   Administered Date(s) Administered   • COVID-19 (PFIZER) 03/10/2021, 03/31/2021   • Flu Vaccine Quad PF >18YRS 09/26/2020   • Flu Vaccine Quad PF >36MO 09/26/2020         Objective       Vitals:    06/30/21 0817   BP: 135/77   Pulse: 80   Resp: 18   Temp: 97.9 °F (36.6 °C)   TempSrc: Temporal   SpO2: 98%   Weight: 90.2 kg (198 lb 12.8 oz)   Height: 160 cm (63\")   PainSc: 0-No pain      Body mass index is 35.22 kg/m².   Wt Readings from Last 3 Encounters:   06/30/21 90.2 kg (198 lb 12.8 oz)   05/03/21 92.1 kg (203 lb)   03/31/21 91.2 kg (201 lb)      BP Readings from Last 3 Encounters:   06/30/21 135/77   03/31/21 123/85   01/04/21 117/78        Physical Exam  Vitals reviewed.   Constitutional:       Appearance: Normal appearance. She is well-developed.   HENT:      Head: Normocephalic and atraumatic.   Eyes:      Conjunctiva/sclera: Conjunctivae normal.      Pupils: Pupils are equal, round, and reactive to light.   Cardiovascular:      Rate and Rhythm: Normal rate and regular rhythm.      Heart sounds: Normal heart sounds. No murmur heard.     Pulmonary:      Effort: Pulmonary effort is normal.      Breath sounds: Normal breath sounds. No wheezing or rhonchi.   Abdominal:      General: Bowel sounds are normal. There is no distension.      Palpations: Abdomen is soft.      Tenderness: There is no abdominal tenderness.   Skin:     General: Skin is warm and dry.   Neurological:      Mental " Status: She is alert and oriented to person, place, and time.   Psychiatric:         Mood and Affect: Mood and affect normal.         Behavior: Behavior normal.         Thought Content: Thought content normal.         Judgment: Judgment normal.             Result Review :     CMP    CMP 10/26/20 11/5/20 3/15/21   Glucose 90 86 86   BUN 16 10 15   Creatinine 1.04 (A) 0.87 0.90   Sodium 134 (A) 135 134 (A)   Potassium 4.7 4.0 4.2   Chloride 102 101 101   Calcium 10.1 8.9 9.1   Albumin 3.6  3.6   Total Bilirubin 0.33  0.42   Alkaline Phosphatase 76  70   AST (SGOT) 26  25   ALT (SGPT) 18  15   (A) Abnormal value       Comments are available for some flowsheets but are not being displayed.           CBC    CBC 10/26/20 11/5/20 3/15/21   WBC 6.65 5.62 5.84   RBC 4.47 3.48 (A) 4.47   Hemoglobin 12.6 9.8 (A) 11.9 (A)   Hematocrit 38.8 31.2 (A) 38.7   MCV 86.8 89.7 86.6   MCH 28.2 28.2 26.6 (A)   MCHC 32.5 (A) 31.4 (A) 30.7 (A)   RDW 14.2 14.1 16.1 (A)   Platelets 284 236 316   (A) Abnormal value       Comments are available for some flowsheets but are not being displayed.             Data reviewed: uds due              Assessment and Plan        Diagnoses and all orders for this visit:    1. Primary insomnia (Primary)  -     Urine Drug Screen - Urine, Clean Catch  -     zolpidem CR (AMBIEN CR) 6.25 MG CR tablet; Take 1 tablet by mouth At Night As Needed for Sleep.  Dispense: 90 tablet; Refill: 0    2. Gastroesophageal reflux disease without esophagitis    3. Visit for screening mammogram  -     Mammo Screening Digital Tomosynthesis Bilateral With CAD; Future    4. Chronic pain of left knee  -     Diclofenac Sodium (VOLTAREN) 1 % gel gel; Apply 2 g topically to the appropriate area as directed 4 (Four) Times a Day As Needed (knee pain).  Dispense: 100 g; Refill: 1    Other orders  -     Calcium Carb-Cholecalciferol (Os-Nick Calcium + D3) 500-200 MG-UNIT tablet; Take 1 tablet by mouth 2 (two) times a day.  Dispense: 180  tablet; Refill: 1          Follow Up     Return in about 3 months (around 9/30/2021) for Next scheduled follow up.    Patient was given instructions and counseling regarding her condition or for health maintenance advice. Please see specific information pulled into the AVS if appropriate.     Obtained a written consent for Jamaal query. Discussed the risk and benefits of the use of controlled substances with the patient, including the risk of tolerance and drug dependence.  The patient has been counseled on the need to have an exit strategy, including potentially discontinuing the use of controlled substances.  Jamaal has or will be reviewed as soon as it becomes available.      GUNJAN Malloy     no

## 2021-09-14 ENCOUNTER — TELEPHONE (OUTPATIENT)
Dept: FAMILY MEDICINE CLINIC | Facility: CLINIC | Age: 66
End: 2021-09-14

## 2021-09-14 DIAGNOSIS — Z13.220 SCREENING FOR LIPID DISORDERS: ICD-10-CM

## 2021-09-14 DIAGNOSIS — E55.9 VITAMIN D DEFICIENCY: ICD-10-CM

## 2021-09-14 DIAGNOSIS — Z01.89 ROUTINE LAB DRAW: ICD-10-CM

## 2021-09-14 DIAGNOSIS — D64.9 ANEMIA, UNSPECIFIED TYPE: Primary | ICD-10-CM

## 2021-09-14 DIAGNOSIS — Z13.29 SCREENING FOR THYROID DISORDER: ICD-10-CM

## 2021-09-14 DIAGNOSIS — E53.9 VITAMIN B DEFICIENCY: ICD-10-CM

## 2021-09-14 NOTE — TELEPHONE ENCOUNTER
DELETE AFTER REVIEWING: Telephone encounter to be sent to the clinical pool     Caller: Jeimy Willard    Relationship: Self    Best call back number: [phone]    What orders are you requesting (i.e. lab or imaging): LABS    In what timeframe would the patient need to come in: AS SOON AS POSSIBLE    Where will you receive your lab/imaging services:     Additional notes:PATIENT IS NEEDING LAB WORK DONE BEFORE HER APPOINTMENT AND WOULD LIKE TO KNOW IF IT NEEDS TO BE FASTING. PLEASE CALL AND ADVISED THE PATIENT.

## 2021-09-16 ENCOUNTER — LAB (OUTPATIENT)
Dept: LAB | Facility: HOSPITAL | Age: 66
End: 2021-09-16

## 2021-09-16 DIAGNOSIS — E55.9 VITAMIN D DEFICIENCY: ICD-10-CM

## 2021-09-16 DIAGNOSIS — D64.9 ANEMIA, UNSPECIFIED TYPE: ICD-10-CM

## 2021-09-16 DIAGNOSIS — E53.9 VITAMIN B DEFICIENCY: ICD-10-CM

## 2021-09-16 DIAGNOSIS — Z13.220 SCREENING FOR LIPID DISORDERS: ICD-10-CM

## 2021-09-16 DIAGNOSIS — Z13.29 SCREENING FOR THYROID DISORDER: ICD-10-CM

## 2021-09-16 DIAGNOSIS — Z01.89 ROUTINE LAB DRAW: ICD-10-CM

## 2021-09-16 LAB
25(OH)D3 SERPL-MCNC: 50 NG/ML (ref 30–100)
ALBUMIN SERPL-MCNC: 3.9 G/DL (ref 3.5–5.2)
ALBUMIN/GLOB SERPL: 0.7 G/DL
ALP SERPL-CCNC: 86 U/L (ref 39–117)
ALT SERPL W P-5'-P-CCNC: 21 U/L (ref 1–33)
ANION GAP SERPL CALCULATED.3IONS-SCNC: 9.1 MMOL/L (ref 5–15)
AST SERPL-CCNC: 32 U/L (ref 1–32)
BASOPHILS # BLD AUTO: 0.07 10*3/MM3 (ref 0–0.2)
BASOPHILS NFR BLD AUTO: 0.9 % (ref 0–1.5)
BILIRUB SERPL-MCNC: 0.3 MG/DL (ref 0–1.2)
BUN SERPL-MCNC: 19 MG/DL (ref 8–23)
BUN/CREAT SERPL: 16.5 (ref 7–25)
CALCIUM SPEC-SCNC: 9.3 MG/DL (ref 8.6–10.5)
CHLORIDE SERPL-SCNC: 99 MMOL/L (ref 98–107)
CHOLEST SERPL-MCNC: 169 MG/DL (ref 0–200)
CO2 SERPL-SCNC: 22.9 MMOL/L (ref 22–29)
CREAT SERPL-MCNC: 1.15 MG/DL (ref 0.57–1)
DEPRECATED RDW RBC AUTO: 44 FL (ref 37–54)
EOSINOPHIL # BLD AUTO: 0.38 10*3/MM3 (ref 0–0.4)
EOSINOPHIL NFR BLD AUTO: 4.7 % (ref 0.3–6.2)
ERYTHROCYTE [DISTWIDTH] IN BLOOD BY AUTOMATED COUNT: 14.3 % (ref 12.3–15.4)
FOLATE SERPL-MCNC: >20 NG/ML (ref 4.78–24.2)
GFR SERPL CREATININE-BSD FRML MDRD: 48 ML/MIN/1.73
GLOBULIN UR ELPH-MCNC: 5.4 GM/DL
GLUCOSE SERPL-MCNC: 85 MG/DL (ref 65–99)
HCT VFR BLD AUTO: 37.8 % (ref 34–46.6)
HDLC SERPL-MCNC: 59 MG/DL (ref 40–60)
HGB BLD-MCNC: 12.2 G/DL (ref 12–15.9)
IMM GRANULOCYTES # BLD AUTO: 0.01 10*3/MM3 (ref 0–0.05)
IMM GRANULOCYTES NFR BLD AUTO: 0.1 % (ref 0–0.5)
IRON 24H UR-MRATE: 62 MCG/DL (ref 37–145)
IRON SATN MFR SERPL: 14 % (ref 20–50)
LDLC SERPL CALC-MCNC: 87 MG/DL (ref 0–100)
LDLC/HDLC SERPL: 1.42 {RATIO}
LYMPHOCYTES # BLD AUTO: 4.02 10*3/MM3 (ref 0.7–3.1)
LYMPHOCYTES NFR BLD AUTO: 50 % (ref 19.6–45.3)
MCH RBC QN AUTO: 27.4 PG (ref 26.6–33)
MCHC RBC AUTO-ENTMCNC: 32.3 G/DL (ref 31.5–35.7)
MCV RBC AUTO: 84.9 FL (ref 79–97)
MONOCYTES # BLD AUTO: 0.67 10*3/MM3 (ref 0.1–0.9)
MONOCYTES NFR BLD AUTO: 8.3 % (ref 5–12)
NEUTROPHILS NFR BLD AUTO: 2.89 10*3/MM3 (ref 1.7–7)
NEUTROPHILS NFR BLD AUTO: 36 % (ref 42.7–76)
NRBC BLD AUTO-RTO: 0 /100 WBC (ref 0–0.2)
PLATELET # BLD AUTO: 322 10*3/MM3 (ref 140–450)
PMV BLD AUTO: 10.5 FL (ref 6–12)
POTASSIUM SERPL-SCNC: 4.7 MMOL/L (ref 3.5–5.2)
PROT SERPL-MCNC: 9.3 G/DL (ref 6–8.5)
RBC # BLD AUTO: 4.45 10*6/MM3 (ref 3.77–5.28)
SODIUM SERPL-SCNC: 131 MMOL/L (ref 136–145)
TIBC SERPL-MCNC: 441 MCG/DL (ref 298–536)
TRANSFERRIN SERPL-MCNC: 296 MG/DL (ref 200–360)
TRIGL SERPL-MCNC: 132 MG/DL (ref 0–150)
TSH SERPL DL<=0.05 MIU/L-ACNC: 3.26 UIU/ML (ref 0.27–4.2)
VIT B12 BLD-MCNC: 414 PG/ML (ref 211–946)
VLDLC SERPL-MCNC: 23 MG/DL (ref 5–40)
WBC # BLD AUTO: 8.04 10*3/MM3 (ref 3.4–10.8)

## 2021-09-16 PROCEDURE — 82306 VITAMIN D 25 HYDROXY: CPT

## 2021-09-16 PROCEDURE — 84466 ASSAY OF TRANSFERRIN: CPT

## 2021-09-16 PROCEDURE — 83540 ASSAY OF IRON: CPT

## 2021-09-16 PROCEDURE — 85025 COMPLETE CBC W/AUTO DIFF WBC: CPT

## 2021-09-16 PROCEDURE — 80053 COMPREHEN METABOLIC PANEL: CPT

## 2021-09-16 PROCEDURE — 82746 ASSAY OF FOLIC ACID SERUM: CPT

## 2021-09-16 PROCEDURE — 80061 LIPID PANEL: CPT

## 2021-09-16 PROCEDURE — 82607 VITAMIN B-12: CPT

## 2021-09-16 PROCEDURE — 84443 ASSAY THYROID STIM HORMONE: CPT

## 2021-09-16 PROCEDURE — 36415 COLL VENOUS BLD VENIPUNCTURE: CPT

## 2021-09-17 ENCOUNTER — TELEPHONE (OUTPATIENT)
Dept: FAMILY MEDICINE CLINIC | Facility: CLINIC | Age: 66
End: 2021-09-17

## 2021-09-17 ENCOUNTER — OFFICE VISIT (OUTPATIENT)
Dept: FAMILY MEDICINE CLINIC | Facility: CLINIC | Age: 66
End: 2021-09-17

## 2021-09-17 VITALS
BODY MASS INDEX: 35.79 KG/M2 | WEIGHT: 202 LBS | OXYGEN SATURATION: 98 % | RESPIRATION RATE: 18 BRPM | TEMPERATURE: 96 F | DIASTOLIC BLOOD PRESSURE: 67 MMHG | SYSTOLIC BLOOD PRESSURE: 130 MMHG | HEIGHT: 63 IN | HEART RATE: 79 BPM

## 2021-09-17 DIAGNOSIS — Z98.890 HISTORY OF LEFT KNEE SURGERY: ICD-10-CM

## 2021-09-17 DIAGNOSIS — K21.9 GASTROESOPHAGEAL REFLUX DISEASE WITHOUT ESOPHAGITIS: ICD-10-CM

## 2021-09-17 DIAGNOSIS — I10 ESSENTIAL HYPERTENSION: ICD-10-CM

## 2021-09-17 DIAGNOSIS — E55.9 VITAMIN D DEFICIENCY: ICD-10-CM

## 2021-09-17 DIAGNOSIS — C80.1 CANCER (HCC): ICD-10-CM

## 2021-09-17 DIAGNOSIS — F51.01 PRIMARY INSOMNIA: ICD-10-CM

## 2021-09-17 DIAGNOSIS — M25.562 CHRONIC PAIN OF LEFT KNEE: ICD-10-CM

## 2021-09-17 DIAGNOSIS — Z78.0 POSTMENOPAUSAL: Primary | ICD-10-CM

## 2021-09-17 DIAGNOSIS — Z23 NEED FOR INFLUENZA VACCINATION: ICD-10-CM

## 2021-09-17 DIAGNOSIS — G89.29 CHRONIC PAIN OF LEFT KNEE: ICD-10-CM

## 2021-09-17 PROCEDURE — 90686 IIV4 VACC NO PRSV 0.5 ML IM: CPT | Performed by: NURSE PRACTITIONER

## 2021-09-17 PROCEDURE — 90471 IMMUNIZATION ADMIN: CPT | Performed by: NURSE PRACTITIONER

## 2021-09-17 PROCEDURE — 99214 OFFICE O/P EST MOD 30 MIN: CPT | Performed by: NURSE PRACTITIONER

## 2021-09-17 RX ORDER — CELECOXIB 200 MG/1
200 CAPSULE ORAL DAILY
Qty: 90 CAPSULE | Refills: 1 | Status: SHIPPED | OUTPATIENT
Start: 2021-09-17 | End: 2022-03-02 | Stop reason: SDUPTHER

## 2021-09-17 RX ORDER — ZOLPIDEM TARTRATE 6.25 MG/1
6.25 TABLET, FILM COATED, EXTENDED RELEASE ORAL NIGHTLY PRN
Qty: 90 TABLET | Refills: 0 | Status: SHIPPED | OUTPATIENT
Start: 2021-09-17 | End: 2021-12-09 | Stop reason: SDUPTHER

## 2021-09-17 RX ORDER — FOLIC ACID 1 MG/1
1 TABLET ORAL DAILY
Qty: 90 TABLET | Refills: 3 | Status: SHIPPED | OUTPATIENT
Start: 2021-09-17 | End: 2022-08-29 | Stop reason: SDUPTHER

## 2021-09-17 RX ORDER — PANTOPRAZOLE SODIUM 40 MG/1
40 TABLET, DELAYED RELEASE ORAL DAILY
Qty: 90 TABLET | Refills: 1 | Status: SHIPPED | OUTPATIENT
Start: 2021-09-17 | End: 2022-03-02 | Stop reason: SDUPTHER

## 2021-09-17 RX ORDER — LISINOPRIL 10 MG/1
10 TABLET ORAL DAILY
Qty: 90 TABLET | Refills: 3 | Status: SHIPPED | OUTPATIENT
Start: 2021-09-17 | End: 2022-03-02 | Stop reason: SDUPTHER

## 2021-09-17 RX ORDER — LORATADINE 10 MG/1
10 TABLET ORAL DAILY
Qty: 90 TABLET | Refills: 3 | Status: SHIPPED | OUTPATIENT
Start: 2021-09-17 | End: 2022-03-02 | Stop reason: SDUPTHER

## 2021-09-17 RX ORDER — CONJUGATED ESTROGENS 0.62 MG/G
2 CREAM VAGINAL AS NEEDED
Qty: 30 G | Refills: 2 | Status: SHIPPED | OUTPATIENT
Start: 2021-09-17 | End: 2022-03-02 | Stop reason: SDUPTHER

## 2021-09-17 NOTE — PROGRESS NOTES
Chief Complaint  Follow-up (3 Month), Vitamin D Deficiency, Hypertension, Heartburn, Insomnia, Arthritis, and Anemia    Subjective          Jeimy Willard is a 66 y.o. female who presents to [facility]    History of Present Illness    Anemia-patient continues with Dr. Sharma for management.    Hypothyroid-patient reports energy level is good at present.    Hypertension-blood pressure is within normal limits.    GERD-reflux has improved with the addition of 40 mg of Pepcid p.o. nightly.  Patient reports overall much better flares approximately once per week.    Insomnia-patient reports usually 1 awakening at night to use the restroom patient gets approximately 7 hours of restful sleep per night.  Patient denies any medication side effects.    Pt need referral for follow up on total knee 1 year ago.    Pt needs updated referral to oncology for management of history of Lymphoma.    PHQ-2 Total Score:     PHQ-9 Total Score:         Review of Systems   Constitutional: Negative for chills, fatigue and fever.   Respiratory: Negative for cough and shortness of breath.    Cardiovascular: Negative for chest pain and palpitations.   Gastrointestinal: Negative for constipation, diarrhea, nausea and vomiting.   Musculoskeletal: Negative for back pain and neck pain.   Skin: Negative for rash.   Neurological: Negative for dizziness and headaches.   Psychiatric/Behavioral: Positive for sleep disturbance.          Medical History: has a past medical history of Anemia, Arthritis, Cancer (CMS/[facility]), GERD (gastroesophageal reflux disease), Hypertension, Insomnia, Lymphoma (CMS/[facility]) (1999), Osteoporosis, Seasonal allergies, Stomach cancer (CMS/[facility]) (2005), Stomach disorder, Ulcer (traumatic) of oral mucosa, and Vitamin D deficiency.     Surgical History: has a past surgical history that includes Cholecystectomy; Colonoscopy (02/20/2019); Esophagogastroduodenoscopy (03/13/2020); Gallbladder surgery;  Laparoscopic total gastrectomy; and Replacement total knee (Left, 2020).     Family History: family history includes Alzheimer's disease in her maternal grandfather; Arthritis in her mother; Breast cancer in her mother; Cancer in her brother and mother; Diabetes in her brother, father, and mother; Esophageal cancer in her paternal grandmother; Heart disease in her brother and mother; Hypertension in her brother and mother; Kidney failure in her mother; Lung cancer in her maternal grandmother; Stroke in her mother.     Social History: reports that she has never smoked. She has never used smokeless tobacco. She reports that she does not drink alcohol and does not use drugs.    Allergies: Patient has no known allergies.      Health Maintenance Due   Topic Date Due   • ANNUAL PHYSICAL  Never done   • TDAP/TD VACCINES (1 - Tdap) Never done   • ZOSTER VACCINE (1 of 2) Never done   • DXA SCAN  02/05/2020   • HEPATITIS C SCREENING  Never done   • PAP SMEAR  Never done            Current Outpatient Medications:   •  acetaminophen (TYLENOL) 325 MG tablet, Take 650 mg by mouth Every 6 (Six) Hours As Needed., Disp: , Rfl:   •  Calcium Carb-Cholecalciferol (Os-Nick Calcium + D3) 500-200 MG-UNIT tablet, Take 1 tablet by mouth 2 (two) times a day., Disp: 180 tablet, Rfl: 1  •  celecoxib (CeleBREX) 200 MG capsule, Take 1 capsule by mouth Daily., Disp: 90 capsule, Rfl: 1  •  Diclofenac Sodium (VOLTAREN) 1 % gel gel, Apply 2 g topically to the appropriate area as directed 4 (Four) Times a Day As Needed (knee pain)., Disp: 100 g, Rfl: 1  •  famotidine (PEPCID) 40 MG tablet, Take 40 mg by mouth Daily., Disp: , Rfl:   •  folic acid (FOLVITE) 1 MG tablet, Take 1 tablet by mouth Daily., Disp: 90 tablet, Rfl: 3  •  Iron-Vitamin C (Vitron-C)  MG tablet, Vitron-C 65 mg iron- 125 mg oral tablet,delayed release (DR/EC) take 1 tablet by oral route daily   Active, Disp: , Rfl:   •  lisinopril (PRINIVIL,ZESTRIL) 10 MG tablet, Take 1 tablet  "by mouth Daily., Disp: 90 tablet, Rfl: 3  •  loratadine (CLARITIN) 10 MG tablet, Take 1 tablet by mouth Daily., Disp: 90 tablet, Rfl: 3  •  Multiple Vitamins-Minerals (PRESERVISION AREDS 2 PO), PreserVision AREDS 7,160-113-100 unit-mg-unit oral tablet take 1 tablet by oral route 2 times a day   Active, Disp: , Rfl:   •  olopatadine (PATANOL) 0.1 % ophthalmic solution, 1 drop., Disp: , Rfl:   •  pantoprazole (PROTONIX) 40 MG EC tablet, Take 1 tablet by mouth Daily., Disp: 90 tablet, Rfl: 1  •  Premarin 0.625 MG/GM vaginal cream, Insert 2 g into the vagina As Needed (Vaginal irritation)., Disp: 30 g, Rfl: 2  •  Pyridoxine HCl (VITAMIN B-6 PO), , Disp: , Rfl:   •  zolpidem CR (AMBIEN CR) 6.25 MG CR tablet, Take 1 tablet by mouth At Night As Needed for Sleep., Disp: 90 tablet, Rfl: 0  •  estrogens, conjugated, (PREMARIN) 0.625 MG tablet, Take 0.625 mg by mouth Daily. Take daily for 21 days then do not take for 7 days., Disp: , Rfl:       Immunization History   Administered Date(s) Administered   • COVID-19 (PFIZER) 03/10/2021, 03/31/2021   • Flu Vaccine Quad PF >18YRS 09/26/2020   • Flu Vaccine Quad PF >36MO 09/26/2020         Objective       Vitals:    09/17/21 0836   BP: 130/67   Pulse: 79   Resp: 18   Temp: 96 °F (35.6 °C)   TempSrc: Temporal   SpO2: 98%   Weight: 91.6 kg (202 lb)   Height: 160 cm (63\")   PainSc:   2   PainLoc: Generalized      Body mass index is 35.78 kg/m².   Wt Readings from Last 3 Encounters:   09/17/21 91.6 kg (202 lb)   06/30/21 90.2 kg (198 lb 12.8 oz)   05/03/21 92.1 kg (203 lb)      BP Readings from Last 3 Encounters:   09/17/21 130/67   06/30/21 135/77   03/31/21 123/85        Physical Exam  Vitals reviewed.   Constitutional:       Appearance: Normal appearance. She is well-developed.   HENT:      Head: Normocephalic and atraumatic.   Eyes:      Conjunctiva/sclera: Conjunctivae normal.      Pupils: Pupils are equal, round, and reactive to light.   Cardiovascular:      Rate and Rhythm: " Normal rate and regular rhythm.      Heart sounds: Normal heart sounds. No murmur heard.     Pulmonary:      Effort: Pulmonary effort is normal.      Breath sounds: Normal breath sounds. No wheezing or rhonchi.   Abdominal:      General: Bowel sounds are normal. There is no distension.      Palpations: Abdomen is soft.      Tenderness: There is no abdominal tenderness.   Skin:     General: Skin is warm and dry.   Neurological:      Mental Status: She is alert and oriented to person, place, and time.   Psychiatric:         Mood and Affect: Mood and affect normal.         Behavior: Behavior normal.         Thought Content: Thought content normal.         Judgment: Judgment normal.             Result Review :     CMP    CMP 11/5/20 3/15/21 9/16/21   Glucose   85   Glucose 86 86    BUN 10 15 19   Creatinine 0.87 0.90 1.15 (A)   eGFR Non  Am   48 (A)   Sodium 135 134 (A) 131 (A)   Potassium 4.0 4.2 4.7   Chloride 101 101 99   Calcium 8.9 9.1 9.3   Albumin  3.6 3.90   Total Bilirubin  0.42 0.3   Alkaline Phosphatase  70 86   AST (SGOT)  25 32   ALT (SGPT)  15 21   (A) Abnormal value       Comments are available for some flowsheets but are not being displayed.           Lipid Panel    Lipid Panel 3/15/21 9/16/21   Total Cholesterol  169   Total Cholesterol 167    Triglycerides 108 132   HDL Cholesterol 63 (A) 59   VLDL Cholesterol 22 23   LDL Cholesterol  82 87   LDL/HDL Ratio  1.42   (A) Abnormal value       Comments are available for some flowsheets but are not being displayed.           TSH    TSH 3/15/21 9/16/21   TSH 3.420 3.260                      Assessment and Plan        Diagnoses and all orders for this visit:    1. Postmenopausal (Primary)  -     DEXA Bone Density Axial; Future  -     Calcium Carb-Cholecalciferol (Os-Nick Calcium + D3) 500-200 MG-UNIT tablet; Take 1 tablet by mouth 2 (two) times a day.  Dispense: 180 tablet; Refill: 1    2. Vitamin D deficiency    3. Need for influenza vaccination  -      FluLaval >6 Months    4. Primary insomnia  -     zolpidem CR (AMBIEN CR) 6.25 MG CR tablet; Take 1 tablet by mouth At Night As Needed for Sleep.  Dispense: 90 tablet; Refill: 0    5. Essential hypertension  -     lisinopril (PRINIVIL,ZESTRIL) 10 MG tablet; Take 1 tablet by mouth Daily.  Dispense: 90 tablet; Refill: 3    6. Gastroesophageal reflux disease without esophagitis  -     pantoprazole (PROTONIX) 40 MG EC tablet; Take 1 tablet by mouth Daily.  Dispense: 90 tablet; Refill: 1    7. Chronic pain of left knee  -     celecoxib (CeleBREX) 200 MG capsule; Take 1 capsule by mouth Daily.  Dispense: 90 capsule; Refill: 1  -     Diclofenac Sodium (VOLTAREN) 1 % gel gel; Apply 2 g topically to the appropriate area as directed 4 (Four) Times a Day As Needed (knee pain).  Dispense: 100 g; Refill: 1    8. Cancer (CMS/HCC)  -     folic acid (FOLVITE) 1 MG tablet; Take 1 tablet by mouth Daily.  Dispense: 90 tablet; Refill: 3  -     Ambulatory Referral to Oncology    9. History of left knee surgery  -     Ambulatory Referral to Orthopedic Surgery    Other orders  -     Premarin 0.625 MG/GM vaginal cream; Insert 2 g into the vagina As Needed (Vaginal irritation).  Dispense: 30 g; Refill: 2  -     loratadine (CLARITIN) 10 MG tablet; Take 1 tablet by mouth Daily.  Dispense: 90 tablet; Refill: 3          Follow Up     Return in about 3 months (around 12/17/2021) for Next scheduled follow up.     Obtained a written consent for Jamaal query. Discussed the risk and benefits of the use of controlled substances with the patient, including the risk of tolerance and drug dependence.  The patient has been counseled on the need to have an exit strategy, including potentially discontinuing the use of controlled substances.  Jamaal has or will be reviewed as soon as it becomes available.      Patient was given instructions and counseling regarding her condition or for health maintenance advice. Please see specific information pulled into  the AVS if appropriate.     GUNJAN Malloy

## 2021-09-17 NOTE — PATIENT INSTRUCTIONS
Insomnia  Insomnia is a sleep disorder that makes it difficult to fall asleep or stay asleep. Insomnia can cause fatigue, low energy, difficulty concentrating, mood swings, and poor performance at work or school.  There are three different ways to classify insomnia:  · Difficulty falling asleep.  · Difficulty staying asleep.  · Waking up too early in the morning.  Any type of insomnia can be long-term (chronic) or short-term (acute). Both are common. Short-term insomnia usually lasts for three months or less. Chronic insomnia occurs at least three times a week for longer than three months.  What are the causes?  Insomnia may be caused by another condition, situation, or substance, such as:  · Anxiety.  · Certain medicines.  · Gastroesophageal reflux disease (GERD) or other gastrointestinal conditions.  · Asthma or other breathing conditions.  · Restless legs syndrome, sleep apnea, or other sleep disorders.  · Chronic pain.  · Menopause.  · Stroke.  · Abuse of alcohol, tobacco, or illegal drugs.  · Mental health conditions, such as depression.  · Caffeine.  · Neurological disorders, such as Alzheimer's disease.  · An overactive thyroid (hyperthyroidism).  Sometimes, the cause of insomnia may not be known.  What increases the risk?  Risk factors for insomnia include:  · Gender. Women are affected more often than men.  · Age. Insomnia is more common as you get older.  · Stress.  · Lack of exercise.  · Irregular work schedule or working night shifts.  · Traveling between different time zones.  · Certain medical and mental health conditions.  What are the signs or symptoms?  If you have insomnia, the main symptom is having trouble falling asleep or having trouble staying asleep. This may lead to other symptoms, such as:  · Feeling fatigued or having low energy.  · Feeling nervous about going to sleep.  · Not feeling rested in the morning.  · Having trouble concentrating.  · Feeling irritable, anxious, or depressed.  How  is this diagnosed?  This condition may be diagnosed based on:  · Your symptoms and medical history. Your health care provider may ask about:  ? Your sleep habits.  ? Any medical conditions you have.  ? Your mental health.  · A physical exam.  How is this treated?  Treatment for insomnia depends on the cause. Treatment may focus on treating an underlying condition that is causing insomnia. Treatment may also include:  · Medicines to help you sleep.  · Counseling or therapy.  · Lifestyle adjustments to help you sleep better.  Follow these instructions at home:  Eating and drinking    · Limit or avoid alcohol, caffeinated beverages, and cigarettes, especially close to bedtime. These can disrupt your sleep.  · Do not eat a large meal or eat spicy foods right before bedtime. This can lead to digestive discomfort that can make it hard for you to sleep.    Sleep habits    · Keep a sleep diary to help you and your health care provider figure out what could be causing your insomnia. Write down:  ? When you sleep.  ? When you wake up during the night.  ? How well you sleep.  ? How rested you feel the next day.  ? Any side effects of medicines you are taking.  ? What you eat and drink.  · Make your bedroom a dark, comfortable place where it is easy to fall asleep.  ? Put up shades or blackout curtains to block light from outside.  ? Use a white noise machine to block noise.  ? Keep the temperature cool.  · Limit screen use before bedtime. This includes:  ? Watching TV.  ? Using your smartphone, tablet, or computer.  · Stick to a routine that includes going to bed and waking up at the same times every day and night. This can help you fall asleep faster. Consider making a quiet activity, such as reading, part of your nighttime routine.  · Try to avoid taking naps during the day so that you sleep better at night.  · Get out of bed if you are still awake after 15 minutes of trying to sleep. Keep the lights down, but try reading or  doing a quiet activity. When you feel sleepy, go back to bed.    General instructions  · Take over-the-counter and prescription medicines only as told by your health care provider.  · Exercise regularly, as told by your health care provider. Avoid exercise starting several hours before bedtime.  · Use relaxation techniques to manage stress. Ask your health care provider to suggest some techniques that may work well for you. These may include:  ? Breathing exercises.  ? Routines to release muscle tension.  ? Visualizing peaceful scenes.  · Make sure that you drive carefully. Avoid driving if you feel very sleepy.  · Keep all follow-up visits as told by your health care provider. This is important.  Contact a health care provider if:  · You are tired throughout the day.  · You have trouble in your daily routine due to sleepiness.  · You continue to have sleep problems, or your sleep problems get worse.  Get help right away if:  · You have serious thoughts about hurting yourself or someone else.  If you ever feel like you may hurt yourself or others, or have thoughts about taking your own life, get help right away. You can go to your nearest emergency department or call:  · Your local emergency services (911 in the [place]  · A suicide crisis helpline, such as the National Suicide Prevention Lifeline at 1-138.823.9250. This is open 24 hours a day.  Summary  · Insomnia is a sleep disorder that makes it difficult to fall asleep or stay asleep.  · Insomnia can be long-term (chronic) or short-term (acute).  · Treatment for insomnia depends on the cause. Treatment may focus on treating an underlying condition that is causing insomnia.  · Keep a sleep diary to help you and your health care provider figure out what could be causing your insomnia.  This information is not intended to replace advice given to you by your health care provider. Make sure you discuss any questions you have with your health care provider.  Document  Revised: 11/30/2018 Document Reviewed: 09/27/2018  Elsevier Patient Education © 2021 [employer]

## 2021-11-01 ENCOUNTER — OFFICE VISIT (OUTPATIENT)
Dept: ORTHOPEDIC SURGERY | Facility: CLINIC | Age: 66
End: 2021-11-01

## 2021-11-01 VITALS — HEIGHT: 63 IN | HEART RATE: 84 BPM | OXYGEN SATURATION: 97 % | WEIGHT: 207.4 LBS | BODY MASS INDEX: 36.75 KG/M2

## 2021-11-01 DIAGNOSIS — Z47.89 AFTERCARE FOLLOWING SURGERY OF THE MUSCULOSKELETAL SYSTEM: Primary | ICD-10-CM

## 2021-11-01 DIAGNOSIS — Z47.89 AFTERCARE FOLLOWING SURGERY OF THE MUSCULOSKELETAL SYSTEM: ICD-10-CM

## 2021-11-01 PROCEDURE — 99212 OFFICE O/P EST SF 10 MIN: CPT | Performed by: PHYSICIAN ASSISTANT

## 2021-11-01 NOTE — PROGRESS NOTES
"Chief Complaint  Follow-up of the Left Knee    Subjective          Jeimy Willard is a 66 y.o. female  presents to [facility] for   History of Present Illness    Patient presents for follow-up evaluation of left total knee arthroplasty, 11/4/2020.  Patient states she has been doing well since her last visit.  Patient went on a cross-country trip back in the spring she states her knee did well with all her activities she [place]  Patient states she is very pleased with her surgery results it has helped her increase her activity level.  Patient denies pain in her knee she states about 4 months ago there was an episode where she slept wrong and the knee locked up on her it took a few minutes when she woke up for it to loosen up.  Patient denies any recent locking catching of the knee she denies pain in the knee denies swelling to the knee.    No Known Allergies     Social History     Socioeconomic History   • Marital status:    Tobacco Use   • Smoking status: Never Smoker   • Smokeless tobacco: Never Used   Vaping Use   • Vaping Use: Never used   Substance and Sexual Activity   • Alcohol use: Never   • Drug use: Never   • Sexual activity: Defer        REVIEW OF SYSTEMS    Constitutional: Denies fevers, chills, weight loss  Cardiovascular: Denies chest pain, shortness of breath  Skin: Denies rashes, acute skin changes  Neurologic: Denies headache, loss of consciousness  MSK: Left knee pain      Objective   Vital Signs:   Pulse 84   Ht 158.8 cm (62.5\")   Wt 94.1 kg (207 lb 6.4 oz)   SpO2 97%   BMI 37.33 kg/m²     Body mass index is 37.33 kg/m².    Physical Exam    Left knee: Incision is well-healed, no erythema, no ecchymosis, no swelling, no signs of infection, no effusion.  Nontender to palpation, no pain with range of motion, full extension, flexion 120, stable to varus/valgus stress, stable anterior/posterior drawer.  Nontender " calf.    Procedures    Imaging Results (Most Recent)     Procedure Component Value Units Date/Time    XR Knee 3 View Left [[id number]] Resulted: 11/01/21 0856     Updated: 11/01/21 0857    Narrative:      • View:AP, Lateral and Sunrise view(s)  • Site: Left knee  • Indication: Left knee pain  • Study: X-rays ordered, taken in the office, and reviewed today  • X-ray: Intact appearing left knee replacement, no evidence of   subsidence, no loosening or periprosthetic fracture, normal alignment  • Comparative data: No comparative data found             Result Review :   The following data was reviewed by: MALCOLM Zamarripa on 11/01/2021:  Data reviewed: Radiologic studies Reviewed by me with the patient             Assessment and Plan    Diagnoses and all orders for this visit:    1. Aftercare following surgery of Left Total Knee Arthroplasty, 11/4/2020 (Primary)  -     XR Knee 3 View Left    2. Aftercare following surgery of the musculoskeletal system  -     XR Knee 3 View Left        Reviewed x-rays with the patient, advised her to continue activity as tolerated if any new or concerning symptoms occur follow-up sooner otherwise follow-up in 1 year for reevaluation.  Patient agreed.    Call or return if worsening symptoms.    Follow Up   Return in about 1 year (around 11/1/2022) for Recheck.  Patient was given instructions and counseling regarding her condition or for health maintenance advice. Please see specific information pulled into the AVS if appropriate.

## 2021-12-09 ENCOUNTER — HOSPITAL ENCOUNTER (OUTPATIENT)
Dept: BONE DENSITY | Facility: HOSPITAL | Age: 66
Discharge: HOME | End: 2021-12-09
Admitting: NURSE PRACTITIONER

## 2021-12-09 ENCOUNTER — OFFICE VISIT (OUTPATIENT)
Dept: FAMILY MEDICINE CLINIC | Facility: CLINIC | Age: 66
End: 2021-12-09

## 2021-12-09 VITALS
OXYGEN SATURATION: 98 % | HEIGHT: 63 IN | SYSTOLIC BLOOD PRESSURE: 138 MMHG | TEMPERATURE: 98.2 F | WEIGHT: 211.8 LBS | DIASTOLIC BLOOD PRESSURE: 87 MMHG | BODY MASS INDEX: 37.53 KG/M2 | HEART RATE: 80 BPM

## 2021-12-09 DIAGNOSIS — Z23 NEED FOR SHINGLES VACCINE: Primary | ICD-10-CM

## 2021-12-09 DIAGNOSIS — F51.01 PRIMARY INSOMNIA: ICD-10-CM

## 2021-12-09 DIAGNOSIS — Z78.0 POSTMENOPAUSAL: ICD-10-CM

## 2021-12-09 LAB
AMPHET+METHAMPHET UR QL: NEGATIVE
BARBITURATES UR QL SCN: NEGATIVE
BENZODIAZ UR QL SCN: NEGATIVE
CANNABINOIDS SERPL QL: NEGATIVE
COCAINE UR QL: NEGATIVE
METHADONE UR QL SCN: NEGATIVE
OPIATES UR QL: NEGATIVE
OXYCODONE UR QL SCN: NEGATIVE

## 2021-12-09 PROCEDURE — 80307 DRUG TEST PRSMV CHEM ANLYZR: CPT | Performed by: NURSE PRACTITIONER

## 2021-12-09 PROCEDURE — 99213 OFFICE O/P EST LOW 20 MIN: CPT | Performed by: NURSE PRACTITIONER

## 2021-12-09 PROCEDURE — 77080 DXA BONE DENSITY AXIAL: CPT

## 2021-12-09 RX ORDER — ZOLPIDEM TARTRATE 6.25 MG/1
6.25 TABLET, FILM COATED, EXTENDED RELEASE ORAL NIGHTLY PRN
Qty: 90 TABLET | Refills: 0 | Status: SHIPPED | OUTPATIENT
Start: 2021-12-09 | End: 2022-03-02 | Stop reason: SDUPTHER

## 2021-12-09 NOTE — PROGRESS NOTES
Chief Complaint  Follow-up, Hypertension, Insomnia, and Arthritis    Subjective          Jeimy Willard is a 66 y.o. female who presents to [facility]    History of Present Illness    Insomnia - pt is sleeping well. Getting approx 7-8 hours of sleep. Awakens rested.    HTN - well controlled.     PHQ-2 Total Score:     PHQ-9 Total Score:         Review of Systems   Psychiatric/Behavioral: Positive for sleep disturbance.          Medical History: has a past medical history of Anemia, Arthritis, Cancer (HCC), GERD (gastroesophageal reflux disease), Hypertension, Insomnia, Lymphoma (HCC) (1999), Osteoporosis, Seasonal allergies, Stomach cancer (HCC) (2005), Stomach disorder, Ulcer (traumatic) of oral mucosa, and Vitamin D deficiency.     Surgical History: has a past surgical history that includes Cholecystectomy; Colonoscopy (02/20/2019); Esophagogastroduodenoscopy (03/13/2020); Gallbladder surgery; Laparoscopic total gastrectomy; and Replacement total knee (Left, 2020).     Family History: family history includes Alzheimer's disease in her maternal grandfather; Arthritis in her mother; Breast cancer in her mother; Cancer in her brother and mother; Diabetes in her brother, father, and mother; Esophageal cancer in her paternal grandmother; Heart disease in her brother and mother; Hypertension in her brother and mother; Kidney failure in her mother; Lung cancer in her maternal grandmother; Stroke in her mother.     Social History: reports that she has never smoked. She has never used smokeless tobacco. She reports that she does not drink alcohol and does not use drugs.    Allergies: Patient has no known allergies.      Health Maintenance Due   Topic Date Due   • ANNUAL PHYSICAL  Never done   • TDAP/TD VACCINES (1 - Tdap) Never done   • ZOSTER VACCINE (1 of 2) Never done   • DXA SCAN  02/05/2020   • HEPATITIS C SCREENING  Never done   • PAP SMEAR  Never done            Current Outpatient  Medications:   •  acetaminophen (TYLENOL) 325 MG tablet, Take 650 mg by mouth Every 6 (Six) Hours As Needed., Disp: , Rfl:   •  Calcium Carb-Cholecalciferol (Os-Nick Calcium + D3) 500-200 MG-UNIT tablet, Take 1 tablet by mouth 2 (two) times a day., Disp: 180 tablet, Rfl: 1  •  celecoxib (CeleBREX) 200 MG capsule, Take 1 capsule by mouth Daily., Disp: 90 capsule, Rfl: 1  •  Diclofenac Sodium (VOLTAREN) 1 % gel gel, Apply 2 g topically to the appropriate area as directed 4 (Four) Times a Day As Needed (knee pain)., Disp: 100 g, Rfl: 1  •  estrogens, conjugated, (PREMARIN) 0.625 MG tablet, Take 0.625 mg by mouth Daily. Take daily for 21 days then do not take for 7 days., Disp: , Rfl:   •  famotidine (PEPCID) 40 MG tablet, Take 40 mg by mouth Daily., Disp: , Rfl:   •  folic acid (FOLVITE) 1 MG tablet, Take 1 tablet by mouth Daily., Disp: 90 tablet, Rfl: 3  •  Iron-Vitamin C (Vitron-C)  MG tablet, Vitron-C 65 mg iron- 125 mg oral tablet,delayed release (DR/EC) take 1 tablet by oral route daily   Active, Disp: , Rfl:   •  lisinopril (PRINIVIL,ZESTRIL) 10 MG tablet, Take 1 tablet by mouth Daily., Disp: 90 tablet, Rfl: 3  •  loratadine (CLARITIN) 10 MG tablet, Take 1 tablet by mouth Daily., Disp: 90 tablet, Rfl: 3  •  Multiple Vitamins-Minerals (PRESERVISION AREDS 2 PO), PreserVision AREDS 7,160-113-100 unit-mg-unit oral tablet take 1 tablet by oral route 2 times a day   Active, Disp: , Rfl:   •  olopatadine (PATANOL) 0.1 % ophthalmic solution, 1 drop., Disp: , Rfl:   •  pantoprazole (PROTONIX) 40 MG EC tablet, Take 1 tablet by mouth Daily., Disp: 90 tablet, Rfl: 1  •  Premarin 0.625 MG/GM vaginal cream, Insert 2 g into the vagina As Needed (Vaginal irritation)., Disp: 30 g, Rfl: 2  •  Pyridoxine HCl (VITAMIN B-6 PO), , Disp: , Rfl:   •  zolpidem CR (AMBIEN CR) 6.25 MG CR tablet, Take 1 tablet by mouth At Night As Needed for Sleep., Disp: 90 tablet, Rfl: 0      Immunization History   Administered Date(s) Administered  "  • COVID-19 (PFIZER) 03/10/2021, 03/31/2021, 10/22/2021   • Flu Vaccine Quad PF >18YRS 09/26/2020   • Flu Vaccine Quad PF >36MO 09/26/2020   • FluLaval/Fluarix/Fluzone >6 09/17/2021         Objective       Vitals:    12/09/21 0742   BP: 138/87   BP Location: Right arm   Patient Position: Sitting   Cuff Size: Adult   Pulse: 80   Temp: 98.2 °F (36.8 °C)   TempSrc: Temporal   SpO2: 98%   Weight: 96.1 kg (211 lb 12.8 oz)   Height: 158.8 cm (62.52\")      Body mass index is 38.1 kg/m².   Wt Readings from Last 3 Encounters:   12/09/21 96.1 kg (211 lb 12.8 oz)   11/01/21 94.1 kg (207 lb 6.4 oz)   09/17/21 91.6 kg (202 lb)      BP Readings from Last 3 Encounters:   12/09/21 138/87   09/17/21 130/67   06/30/21 135/77        Physical Exam  Vitals reviewed.   Constitutional:       Appearance: Normal appearance. She is well-developed.   HENT:      Head: Normocephalic and atraumatic.   Eyes:      Conjunctiva/sclera: Conjunctivae normal.      Pupils: Pupils are equal, round, and reactive to light.   Cardiovascular:      Rate and Rhythm: Normal rate and regular rhythm.      Heart sounds: Normal heart sounds. No murmur heard.      Pulmonary:      Effort: Pulmonary effort is normal.      Breath sounds: Normal breath sounds. No wheezing or rhonchi.   Abdominal:      General: Bowel sounds are normal. There is no distension.      Palpations: Abdomen is soft.      Tenderness: There is no abdominal tenderness.   Skin:     General: Skin is warm and dry.   Neurological:      Mental Status: She is alert and oriented to person, place, and time.   Psychiatric:         Mood and Affect: Mood and affect normal.         Behavior: Behavior normal.         Thought Content: Thought content normal.         Judgment: Judgment normal.             Result Review :                Assessment and Plan        Diagnoses and all orders for this visit:    1. Primary insomnia  Assessment & Plan:  Avoid any electronic use further at least 30 minutes prior to " bedtime.  Cell phone screens, tablets and TVs imitate daylight, so your brain can become confused on the time of day.  No caffeine use in the late afternoon and evenings.      Orders:  -     Urine Drug Screen - Urine, Clean Catch  -     zolpidem CR (AMBIEN CR) 6.25 MG CR tablet; Take 1 tablet by mouth At Night As Needed for Sleep.  Dispense: 90 tablet; Refill: 0        Follow Up     Return in about 3 months (around 3/9/2022).     Obtained a written consent for LEESA query. Discussed the risks and benefits of the use of controlled substances with the patient, including the risk of tolerance and drug dependence.  The patient has been counseled on the need to have an exit strategy, including potentially discontinuing the use of controlled substances.  LEESA has or will be reviewed as soon as it becomes available.      Patient was given instructions and counseling regarding her condition or for health maintenance advice. Please see specific information pulled into the AVS if appropriate.     GUNJAN Malloy

## 2021-12-09 NOTE — PATIENT INSTRUCTIONS
Insomnia  Insomnia is a sleep disorder that makes it difficult to fall asleep or stay asleep. Insomnia can cause fatigue, low energy, difficulty concentrating, mood swings, and poor performance at work or school.  There are three different ways to classify insomnia:  · Difficulty falling asleep.  · Difficulty staying asleep.  · Waking up too early in the morning.  Any type of insomnia can be long-term (chronic) or short-term (acute). Both are common. Short-term insomnia usually lasts for three months or less. Chronic insomnia occurs at least three times a week for longer than three months.  What are the causes?  Insomnia may be caused by another condition, situation, or substance, such as:  · Anxiety.  · Certain medicines.  · Gastroesophageal reflux disease (GERD) or other gastrointestinal conditions.  · Asthma or other breathing conditions.  · Restless legs syndrome, sleep apnea, or other sleep disorders.  · Chronic pain.  · Menopause.  · Stroke.  · Abuse of alcohol, tobacco, or illegal drugs.  · Mental health conditions, such as depression.  · Caffeine.  · Neurological disorders, such as Alzheimer's disease.  · An overactive thyroid (hyperthyroidism).  Sometimes, the cause of insomnia may not be known.  What increases the risk?  Risk factors for insomnia include:  · Gender. Women are affected more often than men.  · Age. Insomnia is more common as you get older.  · Stress.  · Lack of exercise.  · Irregular work schedule or working night shifts.  · Traveling between different time zones.  · Certain medical and mental health conditions.  What are the signs or symptoms?  If you have insomnia, the main symptom is having trouble falling asleep or having trouble staying asleep. This may lead to other symptoms, such as:  · Feeling fatigued or having low energy.  · Feeling nervous about going to sleep.  · Not feeling rested in the morning.  · Having trouble concentrating.  · Feeling irritable, anxious, or depressed.  How  is this diagnosed?  This condition may be diagnosed based on:  · Your symptoms and medical history. Your health care provider may ask about:  ? Your sleep habits.  ? Any medical conditions you have.  ? Your mental health.  · A physical exam.  How is this treated?  Treatment for insomnia depends on the cause. Treatment may focus on treating an underlying condition that is causing insomnia. Treatment may also include:  · Medicines to help you sleep.  · Counseling or therapy.  · Lifestyle adjustments to help you sleep better.  Follow these instructions at home:  Eating and drinking    · Limit or avoid alcohol, caffeinated beverages, and cigarettes, especially close to bedtime. These can disrupt your sleep.  · Do not eat a large meal or eat spicy foods right before bedtime. This can lead to digestive discomfort that can make it hard for you to sleep.    Sleep habits    · Keep a sleep diary to help you and your health care provider figure out what could be causing your insomnia. Write down:  ? When you sleep.  ? When you wake up during the night.  ? How well you sleep.  ? How rested you feel the next day.  ? Any side effects of medicines you are taking.  ? What you eat and drink.  · Make your bedroom a dark, comfortable place where it is easy to fall asleep.  ? Put up shades or blackout curtains to block light from outside.  ? Use a white noise machine to block noise.  ? Keep the temperature cool.  · Limit screen use before bedtime. This includes:  ? Watching TV.  ? Using your smartphone, tablet, or computer.  · Stick to a routine that includes going to bed and waking up at the same times every day and night. This can help you fall asleep faster. Consider making a quiet activity, such as reading, part of your nighttime routine.  · Try to avoid taking naps during the day so that you sleep better at night.  · Get out of bed if you are still awake after 15 minutes of trying to sleep. Keep the lights down, but try reading or  doing a quiet activity. When you feel sleepy, go back to bed.    General instructions  · Take over-the-counter and prescription medicines only as told by your health care provider.  · Exercise regularly, as told by your health care provider. Avoid exercise starting several hours before bedtime.  · Use relaxation techniques to manage stress. Ask your health care provider to suggest some techniques that may work well for you. These may include:  ? Breathing exercises.  ? Routines to release muscle tension.  ? Visualizing peaceful scenes.  · Make sure that you drive carefully. Avoid driving if you feel very sleepy.  · Keep all follow-up visits as told by your health care provider. This is important.  Contact a health care provider if:  · You are tired throughout the day.  · You have trouble in your daily routine due to sleepiness.  · You continue to have sleep problems, or your sleep problems get worse.  Get help right away if:  · You have serious thoughts about hurting yourself or someone else.  If you ever feel like you may hurt yourself or others, or have thoughts about taking your own life, get help right away. You can go to your nearest emergency department or call:  · Your local emergency services (911 in the [place]  · A suicide crisis helpline, such as the National Suicide Prevention Lifeline at 1-722.133.4234. This is open 24 hours a day.  Summary  · Insomnia is a sleep disorder that makes it difficult to fall asleep or stay asleep.  · Insomnia can be long-term (chronic) or short-term (acute).  · Treatment for insomnia depends on the cause. Treatment may focus on treating an underlying condition that is causing insomnia.  · Keep a sleep diary to help you and your health care provider figure out what could be causing your insomnia.  This information is not intended to replace advice given to you by your health care provider. Make sure you discuss any questions you have with your health care provider.  Document  Revised: 11/30/2018 Document Reviewed: 09/27/2018  Elsevier Patient Education © 2021 [employer]

## 2021-12-09 NOTE — ASSESSMENT & PLAN NOTE
Avoid any electronic use further at least 30 minutes prior to bedtime.  Cell phone screens, tablets and TVs imitate daylight, so your brain can become confused on the time of day.  No caffeine use in the late afternoon and evenings.

## 2021-12-27 ENCOUNTER — HOSPITAL ENCOUNTER (OUTPATIENT)
Dept: MAMMOGRAPHY | Facility: HOSPITAL | Age: 66
Discharge: HOME | End: 2021-12-27
Admitting: NURSE PRACTITIONER

## 2021-12-27 DIAGNOSIS — Z12.31 VISIT FOR SCREENING MAMMOGRAM: ICD-10-CM

## 2021-12-27 PROCEDURE — 77063 BREAST TOMOSYNTHESIS BI: CPT

## 2021-12-27 PROCEDURE — 77067 SCR MAMMO BI INCL CAD: CPT

## 2022-02-21 ENCOUNTER — TRANSCRIBE ORDERS (OUTPATIENT)
Dept: ADMINISTRATIVE | Facility: HOSPITAL | Age: 67
End: 2022-02-21

## 2022-02-21 DIAGNOSIS — C85.98 MALIGNANT LYMPHOMAS OF LYMPH NODES OF MULTIPLE SITES: Primary | ICD-10-CM

## 2022-02-22 ENCOUNTER — APPOINTMENT (OUTPATIENT)
Dept: PET IMAGING | Facility: HOSPITAL | Age: 67
End: 2022-02-22

## 2022-03-02 ENCOUNTER — OFFICE VISIT (OUTPATIENT)
Dept: FAMILY MEDICINE CLINIC | Facility: CLINIC | Age: 67
End: 2022-03-02

## 2022-03-02 VITALS
WEIGHT: 215 LBS | HEIGHT: 63 IN | TEMPERATURE: 98 F | DIASTOLIC BLOOD PRESSURE: 62 MMHG | SYSTOLIC BLOOD PRESSURE: 129 MMHG | OXYGEN SATURATION: 97 % | BODY MASS INDEX: 38.09 KG/M2 | HEART RATE: 81 BPM

## 2022-03-02 DIAGNOSIS — Z79.899 LONG-TERM USE OF HIGH-RISK MEDICATION: ICD-10-CM

## 2022-03-02 DIAGNOSIS — K21.9 GASTROESOPHAGEAL REFLUX DISEASE WITHOUT ESOPHAGITIS: ICD-10-CM

## 2022-03-02 DIAGNOSIS — F51.01 PRIMARY INSOMNIA: ICD-10-CM

## 2022-03-02 DIAGNOSIS — Z78.0 POSTMENOPAUSAL: ICD-10-CM

## 2022-03-02 DIAGNOSIS — I10 ESSENTIAL HYPERTENSION: ICD-10-CM

## 2022-03-02 DIAGNOSIS — M25.562 CHRONIC PAIN OF LEFT KNEE: ICD-10-CM

## 2022-03-02 DIAGNOSIS — Z00.00 MEDICARE ANNUAL WELLNESS VISIT, SUBSEQUENT: Primary | ICD-10-CM

## 2022-03-02 DIAGNOSIS — G89.29 CHRONIC PAIN OF LEFT KNEE: ICD-10-CM

## 2022-03-02 PROBLEM — M05.79 RHEUMATOID ARTHRITIS INVOLVING MULTIPLE SITES WITH POSITIVE RHEUMATOID FACTOR: Status: ACTIVE | Noted: 2022-03-02

## 2022-03-02 PROCEDURE — 1125F AMNT PAIN NOTED PAIN PRSNT: CPT | Performed by: NURSE PRACTITIONER

## 2022-03-02 PROCEDURE — G0402 INITIAL PREVENTIVE EXAM: HCPCS | Performed by: NURSE PRACTITIONER

## 2022-03-02 PROCEDURE — 1159F MED LIST DOCD IN RCRD: CPT | Performed by: NURSE PRACTITIONER

## 2022-03-02 PROCEDURE — 80305 DRUG TEST PRSMV DIR OPT OBS: CPT | Performed by: NURSE PRACTITIONER

## 2022-03-02 RX ORDER — CONJUGATED ESTROGENS 0.62 MG/G
2 CREAM VAGINAL AS NEEDED
Qty: 30 G | Refills: 2 | Status: SHIPPED | OUTPATIENT
Start: 2022-03-02 | End: 2022-08-29 | Stop reason: SDUPTHER

## 2022-03-02 RX ORDER — LISINOPRIL 10 MG/1
10 TABLET ORAL DAILY
Qty: 90 TABLET | Refills: 3 | Status: SHIPPED | OUTPATIENT
Start: 2022-03-02 | End: 2022-08-29 | Stop reason: SDUPTHER

## 2022-03-02 RX ORDER — VIT A/VIT C/VIT E/ZINC/COPPER 2148-113
TABLET ORAL
COMMUNITY
Start: 2022-01-01 | End: 2022-08-29 | Stop reason: SDUPTHER

## 2022-03-02 RX ORDER — CYANOCOBALAMIN 1000 UG/ML
INJECTION, SOLUTION INTRAMUSCULAR; SUBCUTANEOUS
COMMUNITY
Start: 2022-01-01

## 2022-03-02 RX ORDER — CELECOXIB 200 MG/1
200 CAPSULE ORAL DAILY
Qty: 90 CAPSULE | Refills: 1 | Status: SHIPPED | OUTPATIENT
Start: 2022-03-02 | End: 2022-08-29 | Stop reason: SDUPTHER

## 2022-03-02 RX ORDER — LORATADINE 10 MG/1
10 TABLET ORAL DAILY
Qty: 90 TABLET | Refills: 3 | Status: SHIPPED | OUTPATIENT
Start: 2022-03-02 | End: 2022-08-29 | Stop reason: SDUPTHER

## 2022-03-02 RX ORDER — PANTOPRAZOLE SODIUM 40 MG/1
40 TABLET, DELAYED RELEASE ORAL DAILY
Qty: 90 TABLET | Refills: 1 | Status: SHIPPED | OUTPATIENT
Start: 2022-03-02 | End: 2022-08-29 | Stop reason: SDUPTHER

## 2022-03-02 RX ORDER — ZOLPIDEM TARTRATE 6.25 MG/1
6.25 TABLET, FILM COATED, EXTENDED RELEASE ORAL NIGHTLY PRN
Qty: 90 TABLET | Refills: 0 | Status: SHIPPED | OUTPATIENT
Start: 2022-03-02 | End: 2022-06-03 | Stop reason: SDUPTHER

## 2022-03-02 NOTE — PROGRESS NOTES
The ABCs of the Annual Wellness Visit  Subsequent Medicare Wellness Visit    Chief Complaint   Patient presents with   • Follow-up     3 month     • Hypertension   • Vitamin D Deficiency      Subjective    History of Present Illness:  Jeimy Willard is a 67 y.o. female who presents for a Subsequent Medicare Wellness Visit.    The following portions of the patient's history were reviewed and   updated as appropriate: allergies, current medications, past family history, past medical history, past social history, past surgical history and problem list.    Compared to one year ago, the patient feels her physical   health is same    Compared to one year ago, the patient feels her mental   health is same    Insomnia - pt is getting atleast 6 hours of sleep per night. Does well until she has to get up to fix her 's lunch.    HTN - well controlled.     Pt has follow up at [facility] d/t elevated labs with Dr. Sharma, oncology.         Recent Hospitalizations:  No Hospitalizations      Current Medical Providers:  Patient Care Team:  Jeaneth Barker APRN as PCP - General (Nurse Practitioner)    Outpatient Medications Prior to Visit   Medication Sig Dispense Refill   • acetaminophen (TYLENOL) 325 MG tablet Take 650 mg by mouth Every 6 (Six) Hours As Needed.     • cyanocobalamin 1000 MCG/ML injection      • estrogens, conjugated, (PREMARIN) 0.625 MG tablet Take 0.625 mg by mouth Daily. Take daily for 21 days then do not take for 7 days.     • famotidine (PEPCID) 40 MG tablet Take 40 mg by mouth Daily.     • folic acid (FOLVITE) 1 MG tablet Take 1 tablet by mouth Daily. 90 tablet 3   • Iron-Vitamin C (Vitron-C)  MG tablet Vitron-C 65 mg iron- 125 mg oral tablet,delayed release (DR/EC) take 1 tablet by oral route daily   Active     • Multiple Vitamins-Minerals (PRESERVISION AREDS 2 PO) PreserVision AREDS 7,160-113-100 unit-mg-unit oral tablet take 1 tablet by oral route 2 times a day   Active     •  olopatadine (PATANOL) 0.1 % ophthalmic solution 1 drop.     • PreserVision AREDS tablet tablet      • Pyridoxine HCl (VITAMIN B-6 PO)      • Calcium Carb-Cholecalciferol (Os-Nick Calcium + D3) 500-200 MG-UNIT tablet Take 1 tablet by mouth 2 (two) times a day. 180 tablet 1   • celecoxib (CeleBREX) 200 MG capsule Take 1 capsule by mouth Daily. 90 capsule 1   • Diclofenac Sodium (VOLTAREN) 1 % gel gel Apply 2 g topically to the appropriate area as directed 4 (Four) Times a Day As Needed (knee pain). 100 g 1   • lisinopril (PRINIVIL,ZESTRIL) 10 MG tablet Take 1 tablet by mouth Daily. 90 tablet 3   • loratadine (CLARITIN) 10 MG tablet Take 1 tablet by mouth Daily. 90 tablet 3   • pantoprazole (PROTONIX) 40 MG EC tablet Take 1 tablet by mouth Daily. 90 tablet 1   • Premarin 0.625 MG/GM vaginal cream Insert 2 g into the vagina As Needed (Vaginal irritation). 30 g 2   • zolpidem CR (AMBIEN CR) 6.25 MG CR tablet Take 1 tablet by mouth At Night As Needed for Sleep. 90 tablet 0     No facility-administered medications prior to visit.       No opioid medication identified on active medication list. I have reviewed chart for other potential  high risk medication/s and harmful drug interactions in the elderly.          Aspirin is not on active medication list.      Patient Active Problem List   Diagnosis   • Anemia   • Arthritis   • Cancer (HCC)   • GERD (gastroesophageal reflux disease)   • Stomach disorder   • Hypertension   • Insomnia   • Osteoporosis   • Seasonal allergic rhinitis   • Ulcerative lesion   • Vitamin D deficiency   • Lymphoma (HCC)   • Aftercare following surgery of Left Total Knee Arthroplasty, 11/4/2020   • Rheumatoid arthritis involving multiple sites with positive rheumatoid factor (HCC)     Advance Care Planning  Advance Directive is on file.      Review of Systems   Constitutional: Negative for fatigue and fever.   Psychiatric/Behavioral: Positive for sleep disturbance. The patient is not nervous/anxious.   "       Objective    Vitals:    03/02/22 0922   BP: 129/62   Pulse: 81   Temp: 98 °F (36.7 °C)   SpO2: 97%   Weight: 97.5 kg (215 lb)   Height: 158.8 cm (62.52\")     BMI Readings from Last 1 Encounters:   03/02/22 38.67 kg/m²   BMI is above normal parameters. Recommendations include: exercise counseling and nutrition counseling    Does the patient have evidence of cognitive impairment? No    Physical Exam  Vitals reviewed.   Constitutional:       Appearance: Normal appearance. She is well-developed.   HENT:      Head: Normocephalic and atraumatic.   Eyes:      Conjunctiva/sclera: Conjunctivae normal.      Pupils: Pupils are equal, round, and reactive to light.   Cardiovascular:      Rate and Rhythm: Normal rate and regular rhythm.      Heart sounds: Normal heart sounds. No murmur heard.      Pulmonary:      Effort: Pulmonary effort is normal.      Breath sounds: Normal breath sounds. No wheezing or rhonchi.   Abdominal:      General: Bowel sounds are normal. There is no distension.      Palpations: Abdomen is soft.      Tenderness: There is no abdominal tenderness.   Skin:     General: Skin is warm and dry.   Neurological:      Mental Status: She is alert and oriented to person, place, and time.   Psychiatric:         Mood and Affect: Mood and affect normal.         Behavior: Behavior normal.         Thought Content: Thought content normal.         Judgment: Judgment normal.                 HEALTH RISK ASSESSMENT    Smoking Status:  Social History     Tobacco Use   Smoking Status Never Smoker   Smokeless Tobacco Never Used     Alcohol Consumption:  Social History     Substance and Sexual Activity   Alcohol Use Never     Fall Risk Screen:    STEADI Fall Risk Assessment was completed, and patient is at LOW risk for falls.Assessment completed on:3/2/2022    Depression Screening:  PHQ-2/PHQ-9 Depression Screening 3/2/2022   Little interest or pleasure in doing things 0   Feeling down, depressed, or hopeless 0   Total " Score 0       Health Habits and Functional and Cognitive Screening:  Functional & Cognitive Status 3/2/2022   Do you have difficulty preparing food and eating? No   Do you have difficulty bathing yourself, getting dressed or grooming yourself? No   Do you have difficulty using the toilet? No   Do you have difficulty moving around from place to place? No   Do you have trouble with steps or getting out of a bed or a chair? No   Current Diet Well Balanced Diet   Dental Exam Up to date   Eye Exam Up to date   Exercise (times per week) 4 times per week   Current Exercises Include Walking   Do you need help using the phone?  No   Are you deaf or do you have serious difficulty hearing?  No   Do you need help with transportation? No   Do you need help shopping? No   Do you need help preparing meals?  No   Do you need help with housework?  No   Do you need help with laundry? No   Do you need help taking your medications? No   Do you need help managing money? No   Do you ever drive or ride in a car without wearing a seat belt? No       Age-appropriate Screening Schedule:  Refer to the list below for future screening recommendations based on patient's age, sex and/or medical conditions. Orders for these recommended tests are listed in the plan section. The patient has been provided with a written plan.    Health Maintenance   Topic Date Due   • TDAP/TD VACCINES (1 - Tdap) Never done   • ZOSTER VACCINE (2 of 2) 02/13/2022   • DXA SCAN  12/09/2023   • MAMMOGRAM  12/27/2023   • INFLUENZA VACCINE  Completed              Assessment/Plan   CMS Preventative Services Quick Reference  Risk Factors Identified During Encounter  Obesity/Overweight   The above risks/problems have been discussed with the patient.  Follow up actions/plans if indicated are seen below in the Assessment/Plan Section.  Pertinent information has been shared with the patient in the After Visit Summary.    Diagnoses and all orders for this visit:    1. Medicare  annual wellness visit, subsequent (Primary)    2. Long-term use of high-risk medication  -     POC Urine Drug Screen [facility]    3. Primary insomnia  -     zolpidem CR (AMBIEN CR) 6.25 MG CR tablet; Take 1 tablet by mouth At Night As Needed for Sleep.  Dispense: 90 tablet; Refill: 0    4. Chronic pain of left knee  -     celecoxib (CeleBREX) 200 MG capsule; Take 1 capsule by mouth Daily.  Dispense: 90 capsule; Refill: 1  -     Diclofenac Sodium (VOLTAREN) 1 % gel gel; Apply 2 g topically to the appropriate area as directed 4 (Four) Times a Day As Needed (knee pain).  Dispense: 100 g; Refill: 1    5. Postmenopausal  -     Calcium Carb-Cholecalciferol (Os-Nick Calcium + D3) 500-200 MG-UNIT tablet; Take 1 tablet by mouth 2 (Two) Times a Day.  Dispense: 180 tablet; Refill: 1    6. Essential hypertension  -     lisinopril (PRINIVIL,ZESTRIL) 10 MG tablet; Take 1 tablet by mouth Daily.  Dispense: 90 tablet; Refill: 3    7. Gastroesophageal reflux disease without esophagitis  -     pantoprazole (PROTONIX) 40 MG EC tablet; Take 1 tablet by mouth Daily.  Dispense: 90 tablet; Refill: 1    Other orders  -     loratadine (CLARITIN) 10 MG tablet; Take 1 tablet by mouth Daily.  Dispense: 90 tablet; Refill: 3  -     Premarin 0.625 MG/GM vaginal cream; Insert 2 g into the vagina As Needed (Vaginal irritation).  Dispense: 30 g; Refill: 2      Avoid any electronic use further at least 30 minutes prior to bedtime.  Cell phone screens, tablets and TVs imitate daylight, so your brain can become confused on the time of day.  No caffeine use in the late afternoon and evenings.    The patient is asked to make an attempt to improve diet and exercise patterns to aid in medical management of this problem.    Follow Up:   Return in about 3 months (around 6/2/2022) for Next scheduled follow up.     An After Visit Summary and PPPS were made available to the patient.    Updated annual wellness visit checklist.  Immunizations up-to-date.   Screening up-to-date or ordered.  Recommend yearly dental and eye exams. Also discussed monitoring of blood pressure and lipids.      GUNJAN Malloy

## 2022-03-02 NOTE — PROGRESS NOTES
Chief Complaint  Follow-up (3 month  ), Hypertension, and Vitamin D Deficiency    Subjective     {Problem List  Visit Diagnosis   Encounters  Notes  Medications  Labs  Result Review Imaging  Media :23}     Jeimy Willard is a 67 y.o. female who presents to [facility]    History of Present Illness        PHQ-2 Total Score: 0   PHQ-9 Total Score: 0       Review of Systems       Medical History: has a past medical history of Anemia, Arthritis, Cancer (HCC), GERD (gastroesophageal reflux disease), Hypertension, Insomnia, Lymphoma (HCC) (1999), Osteoporosis, Seasonal allergies, Stomach cancer (HCC) (2005), Stomach disorder, Ulcer (traumatic) of oral mucosa, and Vitamin D deficiency.     Surgical History: has a past surgical history that includes Cholecystectomy; Colonoscopy (02/20/2019); Esophagogastroduodenoscopy (03/13/2020); Gallbladder surgery; Laparoscopic total gastrectomy; and Replacement total knee (Left, 2020).     Family History: family history includes Alzheimer's disease in her maternal grandfather; Arthritis in her mother; Breast cancer in her mother; Cancer in her brother and mother; Diabetes in her brother, father, and mother; Esophageal cancer in her paternal grandmother; Heart disease in her brother and mother; Hypertension in her brother and mother; Kidney failure in her mother; Lung cancer in her maternal grandmother; Stroke in her mother.     Social History: reports that she has never smoked. She has never used smokeless tobacco. She reports that she does not drink alcohol and does not use drugs.    Allergies: Patient has no known allergies.      Health Maintenance Due   Topic Date Due   • Pneumococcal Vaccine 65+ (1 of 4 - PCV13) Never done   • TDAP/TD VACCINES (1 - Tdap) Never done   • HEPATITIS C SCREENING  Never done   • ANNUAL WELLNESS VISIT  Never done   • ZOSTER VACCINE (2 of 2) 02/13/2022            Current Outpatient Medications:   •  acetaminophen  (TYLENOL) 325 MG tablet, Take 650 mg by mouth Every 6 (Six) Hours As Needed., Disp: , Rfl:   •  Calcium Carb-Cholecalciferol (Os-Nick Calcium + D3) 500-200 MG-UNIT tablet, Take 1 tablet by mouth 2 (two) times a day., Disp: 180 tablet, Rfl: 1  •  celecoxib (CeleBREX) 200 MG capsule, Take 1 capsule by mouth Daily., Disp: 90 capsule, Rfl: 1  •  cyanocobalamin 1000 MCG/ML injection, , Disp: , Rfl:   •  Diclofenac Sodium (VOLTAREN) 1 % gel gel, Apply 2 g topically to the appropriate area as directed 4 (Four) Times a Day As Needed (knee pain)., Disp: 100 g, Rfl: 1  •  estrogens, conjugated, (PREMARIN) 0.625 MG tablet, Take 0.625 mg by mouth Daily. Take daily for 21 days then do not take for 7 days., Disp: , Rfl:   •  famotidine (PEPCID) 40 MG tablet, Take 40 mg by mouth Daily., Disp: , Rfl:   •  folic acid (FOLVITE) 1 MG tablet, Take 1 tablet by mouth Daily., Disp: 90 tablet, Rfl: 3  •  Iron-Vitamin C (Vitron-C)  MG tablet, Vitron-C 65 mg iron- 125 mg oral tablet,delayed release (DR/EC) take 1 tablet by oral route daily   Active, Disp: , Rfl:   •  lisinopril (PRINIVIL,ZESTRIL) 10 MG tablet, Take 1 tablet by mouth Daily., Disp: 90 tablet, Rfl: 3  •  loratadine (CLARITIN) 10 MG tablet, Take 1 tablet by mouth Daily., Disp: 90 tablet, Rfl: 3  •  Multiple Vitamins-Minerals (PRESERVISION AREDS 2 PO), PreserVision AREDS 7,160-113-100 unit-mg-unit oral tablet take 1 tablet by oral route 2 times a day   Active, Disp: , Rfl:   •  olopatadine (PATANOL) 0.1 % ophthalmic solution, 1 drop., Disp: , Rfl:   •  pantoprazole (PROTONIX) 40 MG EC tablet, Take 1 tablet by mouth Daily., Disp: 90 tablet, Rfl: 1  •  Premarin 0.625 MG/GM vaginal cream, Insert 2 g into the vagina As Needed (Vaginal irritation)., Disp: 30 g, Rfl: 2  •  PreserVision AREDS tablet tablet, , Disp: , Rfl:   •  Pyridoxine HCl (VITAMIN B-6 PO), , Disp: , Rfl:   •  zolpidem CR (AMBIEN CR) 6.25 MG CR tablet, Take 1 tablet by mouth At Night As Needed for Sleep., Disp:  "90 tablet, Rfl: 0      Immunization History   Administered Date(s) Administered   • COVID-19 (PFIZER) PURPLE CAP 03/10/2021, 03/31/2021, 10/22/2021   • Flu Vaccine Quad PF >18YRS 09/26/2020   • Flu Vaccine Quad PF >36MO 09/26/2020   • FluLaval/Fluarix/Fluzone >6 09/17/2021   • Shingrix 12/19/2021         Objective       Vitals:    03/02/22 0922   BP: 129/62   Pulse: 81   Temp: 98 °F (36.7 °C)   SpO2: 97%   Weight: 97.5 kg (215 lb)   Height: 158.8 cm (62.52\")      Body mass index is 38.67 kg/m².   Wt Readings from Last 3 Encounters:   03/02/22 97.5 kg (215 lb)   12/09/21 96.1 kg (211 lb 12.8 oz)   11/01/21 94.1 kg (207 lb 6.4 oz)      BP Readings from Last 3 Encounters:   03/02/22 129/62   12/09/21 138/87   09/17/21 130/67        Physical Exam        Result Review :{Labs  Result Review  Imaging  Med Tab  Media :23}     {Ambulatory Labs:94459}    Data reviewed: ***              Assessment and Plan {CC Problem List  Visit Diagnosis  ROS  Review (Popup)  Health Maintenance  Quality  BestPractice  Medications  SmartSets  SnapShot Encounters  Media :23}       There are no diagnoses linked to this encounter.      Follow Up {Instructions Charge Capture  Follow-up Communications :23}    No follow-ups on file.    Patient was given instructions and counseling regarding her condition or for health maintenance advice. Please see specific information pulled into the AVS if appropriate.     Aria Hassan    "

## 2022-03-07 ENCOUNTER — HOSPITAL ENCOUNTER (OUTPATIENT)
Dept: PET IMAGING | Facility: HOSPITAL | Age: 67
Discharge: HOME | End: 2022-03-07

## 2022-03-07 DIAGNOSIS — C85.98 MALIGNANT LYMPHOMAS OF LYMPH NODES OF MULTIPLE SITES: ICD-10-CM

## 2022-03-07 PROCEDURE — 78815 PET IMAGE W/CT SKULL-THIGH: CPT

## 2022-03-07 PROCEDURE — A9552 F18 FDG: HCPCS | Performed by: INTERNAL MEDICINE

## 2022-03-07 PROCEDURE — 0 FLUDEOXYGLUCOSE F18 SOLUTION: Performed by: INTERNAL MEDICINE

## 2022-03-07 RX ADMIN — FLUDEOXYGLUCOSE F18 1 DOSE: 300 INJECTION INTRAVENOUS at 09:09

## 2022-03-16 ENCOUNTER — HOSPITAL ENCOUNTER (OUTPATIENT)
Dept: CT IMAGING | Facility: HOSPITAL | Age: 67
Discharge: HOME | End: 2022-03-16
Admitting: INTERNAL MEDICINE

## 2022-03-16 ENCOUNTER — APPOINTMENT (OUTPATIENT)
Dept: CT IMAGING | Facility: HOSPITAL | Age: 67
End: 2022-03-16

## 2022-03-16 DIAGNOSIS — C85.98 MALIGNANT LYMPHOMAS OF LYMPH NODES OF MULTIPLE SITES: ICD-10-CM

## 2022-03-16 LAB
CREAT BLDA-MCNC: 1.1 MG/DL
EGFRCR SERPLBLD CKD-EPI 2021: 55.9 ML/MIN/1.73

## 2022-03-16 PROCEDURE — 71260 CT THORAX DX C+: CPT

## 2022-03-16 PROCEDURE — 82565 ASSAY OF CREATININE: CPT

## 2022-03-16 PROCEDURE — 74177 CT ABD & PELVIS W/CONTRAST: CPT

## 2022-03-16 PROCEDURE — 0 IOPAMIDOL PER 1 ML: Performed by: INTERNAL MEDICINE

## 2022-03-16 RX ADMIN — IOPAMIDOL 100 ML: 755 INJECTION, SOLUTION INTRAVENOUS at 10:48

## 2022-04-29 ENCOUNTER — HOSPITAL ENCOUNTER (EMERGENCY)
Facility: HOSPITAL | Age: 67
End: 2022-04-29

## 2022-04-29 ENCOUNTER — TRANSCRIBE ORDERS (OUTPATIENT)
Dept: ADMINISTRATIVE | Facility: HOSPITAL | Age: 67
End: 2022-04-29

## 2022-04-29 ENCOUNTER — LAB (OUTPATIENT)
Dept: LAB | Facility: HOSPITAL | Age: 67
End: 2022-04-29

## 2022-04-29 DIAGNOSIS — I10 ESSENTIAL HYPERTENSION, MALIGNANT: ICD-10-CM

## 2022-04-29 DIAGNOSIS — I10 ESSENTIAL HYPERTENSION, MALIGNANT: Primary | ICD-10-CM

## 2022-04-29 LAB
ALBUMIN SERPL-MCNC: 3.6 G/DL (ref 3.5–5.2)
ALBUMIN/GLOB SERPL: 0.7 G/DL
ALP SERPL-CCNC: 73 U/L (ref 39–117)
ALT SERPL W P-5'-P-CCNC: 26 U/L (ref 1–33)
ANION GAP SERPL CALCULATED.3IONS-SCNC: 9.7 MMOL/L (ref 5–15)
AST SERPL-CCNC: 34 U/L (ref 1–32)
BILIRUB SERPL-MCNC: 0.4 MG/DL (ref 0–1.2)
BILIRUB UR QL STRIP: NEGATIVE
BUN SERPL-MCNC: 17 MG/DL (ref 8–23)
BUN/CREAT SERPL: 17 (ref 7–25)
CALCIUM SPEC-SCNC: 8.6 MG/DL (ref 8.6–10.5)
CHLORIDE SERPL-SCNC: 102 MMOL/L (ref 98–107)
CLARITY UR: CLEAR
CO2 SERPL-SCNC: 22.3 MMOL/L (ref 22–29)
COLLECT DURATION TIME UR: 24 HRS
COLOR UR: YELLOW
CREAT CL 24H UR+SERPL-VRATE: 358.9 ML/MIN (ref 88–128)
CREAT CL 24H UR+SERPL-VRATE: 516.8 L/24 HR (ref 126.7–184.3)
CREAT SERPL-MCNC: 1 MG/DL (ref 0.57–1)
CREAT UR-MCNC: 47.8 MG/DL
CREATINE 24H UR-MRATE: 0.96 G/24 HR (ref 0.7–1.6)
EGFRCR SERPLBLD CKD-EPI 2021: 62.6 ML/MIN/1.73
GLOBULIN UR ELPH-MCNC: 4.9 GM/DL
GLUCOSE SERPL-MCNC: 91 MG/DL (ref 65–99)
GLUCOSE UR STRIP-MCNC: NEGATIVE MG/DL
HGB UR QL STRIP.AUTO: NEGATIVE
KETONES UR QL STRIP: NEGATIVE
LEUKOCYTE ESTERASE UR QL STRIP.AUTO: NEGATIVE
NITRITE UR QL STRIP: NEGATIVE
PH UR STRIP.AUTO: 6 [PH] (ref 5–8)
POTASSIUM SERPL-SCNC: 4.5 MMOL/L (ref 3.5–5.2)
PROT SERPL-MCNC: 8.5 G/DL (ref 6–8.5)
PROT UR QL STRIP: NEGATIVE
SODIUM SERPL-SCNC: 134 MMOL/L (ref 136–145)
SP GR UR STRIP: 1.01 (ref 1–1.03)
SPECIMEN VOL 24H UR: 2000 ML
UROBILINOGEN UR QL STRIP: NORMAL

## 2022-04-29 PROCEDURE — 82570 ASSAY OF URINE CREATININE: CPT

## 2022-04-29 PROCEDURE — 36415 COLL VENOUS BLD VENIPUNCTURE: CPT

## 2022-04-29 PROCEDURE — 84156 ASSAY OF PROTEIN URINE: CPT

## 2022-04-29 PROCEDURE — 84166 PROTEIN E-PHORESIS/URINE/CSF: CPT

## 2022-04-29 PROCEDURE — 86335 IMMUNFIX E-PHORSIS/URINE/CSF: CPT

## 2022-04-29 PROCEDURE — 80053 COMPREHEN METABOLIC PANEL: CPT

## 2022-04-29 PROCEDURE — 82384 ASSAY THREE CATECHOLAMINES: CPT

## 2022-04-29 PROCEDURE — 81003 URINALYSIS AUTO W/O SCOPE: CPT

## 2022-04-29 PROCEDURE — 83835 ASSAY OF METANEPHRINES: CPT

## 2022-04-29 PROCEDURE — 82575 CREATININE CLEARANCE TEST: CPT

## 2022-05-03 LAB
ALBUMIN 24H MFR UR ELPH: 21 %
ALPHA1 GLOB 24H MFR UR ELPH: 5.5 %
ALPHA2 GLOB 24H MFR UR ELPH: 8.4 %
B-GLOBULIN MFR UR ELPH: 22.3 %
CREAT 24H UR-MRATE: 968 MG/24 HR (ref 800–1800)
CREAT UR-MCNC: 48.4 MG/DL
GAMMA GLOB 24H MFR UR ELPH: 42.8 %
HIV 1 & 2 AB SER-IMP: ABNORMAL
INTERPRETATION UR IFE-IMP: ABNORMAL
M PROTEIN 24H MFR UR ELPH: 20.4 %
PROT UR-MCNC: 5.8 MG/DL

## 2022-05-09 LAB
DOPAMINE 24H UR-MRATE: 122 UG/24 HR (ref 0–510)
DOPAMINE UR-MCNC: 61 UG/L
EPINEPH 24H UR-MRATE: <2 UG/24 HR (ref 0–20)
EPINEPH UR-MCNC: <1 UG/L
METANEPH 24H UR-MRATE: 118 UG/24 HR (ref 36–209)
METANEPHS 24H UR-MCNC: 59 UG/L
NOREPINEPH 24H UR-MRATE: 28 UG/24 HR (ref 0–135)
NOREPINEPH UR-MCNC: 14 UG/L
NORMETANEPHRINE 24H UR-MCNC: 127 UG/L
NORMETANEPHRINE 24H UR-MRATE: 254 UG/24 HR (ref 131–612)

## 2022-06-03 ENCOUNTER — OFFICE VISIT (OUTPATIENT)
Dept: FAMILY MEDICINE CLINIC | Facility: CLINIC | Age: 67
End: 2022-06-03

## 2022-06-03 VITALS
HEART RATE: 80 BPM | BODY MASS INDEX: 37.21 KG/M2 | DIASTOLIC BLOOD PRESSURE: 72 MMHG | OXYGEN SATURATION: 97 % | HEIGHT: 63 IN | SYSTOLIC BLOOD PRESSURE: 129 MMHG | WEIGHT: 210 LBS

## 2022-06-03 DIAGNOSIS — F51.01 PRIMARY INSOMNIA: ICD-10-CM

## 2022-06-03 DIAGNOSIS — J30.2 SEASONAL ALLERGIC RHINITIS, UNSPECIFIED TRIGGER: Primary | ICD-10-CM

## 2022-06-03 DIAGNOSIS — J40 BRONCHITIS: Primary | ICD-10-CM

## 2022-06-03 PROCEDURE — 96372 THER/PROPH/DIAG INJ SC/IM: CPT | Performed by: NURSE PRACTITIONER

## 2022-06-03 PROCEDURE — 99213 OFFICE O/P EST LOW 20 MIN: CPT | Performed by: NURSE PRACTITIONER

## 2022-06-03 RX ORDER — ZOLPIDEM TARTRATE 6.25 MG/1
6.25 TABLET, FILM COATED, EXTENDED RELEASE ORAL NIGHTLY PRN
Qty: 90 TABLET | Refills: 0 | Status: SHIPPED | OUTPATIENT
Start: 2022-06-03 | End: 2022-08-29 | Stop reason: SDUPTHER

## 2022-06-03 RX ORDER — AMOXICILLIN AND CLAVULANATE POTASSIUM 875; 125 MG/1; MG/1
1 TABLET, FILM COATED ORAL 2 TIMES DAILY
Qty: 20 TABLET | Refills: 0 | Status: SHIPPED | OUTPATIENT
Start: 2022-06-03 | End: 2022-06-13

## 2022-06-03 RX ORDER — BENZONATATE 200 MG/1
200 CAPSULE ORAL 3 TIMES DAILY PRN
Qty: 21 CAPSULE | Refills: 0 | Status: SHIPPED | OUTPATIENT
Start: 2022-06-03 | End: 2022-06-10

## 2022-06-03 RX ORDER — METHYLPREDNISOLONE SODIUM SUCCINATE 125 MG/2ML
125 INJECTION, POWDER, LYOPHILIZED, FOR SOLUTION INTRAMUSCULAR; INTRAVENOUS ONCE
Status: COMPLETED | OUTPATIENT
Start: 2022-06-03 | End: 2022-06-03

## 2022-06-03 RX ADMIN — METHYLPREDNISOLONE SODIUM SUCCINATE 125 MG: 125 INJECTION, POWDER, LYOPHILIZED, FOR SOLUTION INTRAMUSCULAR; INTRAVENOUS at 09:46

## 2022-06-03 NOTE — PROGRESS NOTES
Chief Complaint  Med Refill insomnia, cold      Subjective          Jeimy LUIZA Willard is a 67 y.o. female who presents to [facility]    History of Present Illness    Onset 1 month ago with allergies and cough. Pt has lost her voice, has pramod eye irritation. Pt c/o sore throat and itchy eyes.   Negative covid test at home. Cough is keeping her awake at night and she is having to sleep with cough drop. Cough is semi-productive with clear/white/yellow sputum.    Insomnia - pt is sleeping well with Ambien. Denies any med se.       PHQ-2 Total Score:     PHQ-9 Total Score:          Review of Systems   Constitutional: Negative for chills, fatigue and fever.   Respiratory: Negative for cough and shortness of breath.    Cardiovascular: Negative for chest pain and palpitations.   Gastrointestinal: Negative for constipation, diarrhea, nausea and vomiting.   Musculoskeletal: Negative for back pain and neck pain.   Skin: Negative for rash.   Neurological: Negative for dizziness and headaches.   Psychiatric/Behavioral: Positive for sleep disturbance.          Medical History: has a past medical history of Anemia, Arthritis, Cancer ([facility]), GERD (gastroesophageal reflux disease), Hypertension, Insomnia, Lymphoma ([facility]) (1999), Osteoporosis, Seasonal allergies, Stomach cancer ([facility]) (2005), Stomach disorder, Ulcer (traumatic) of oral mucosa, and Vitamin D deficiency.     Surgical History: has a past surgical history that includes Cholecystectomy; Colonoscopy (02/20/2019); Esophagogastroduodenoscopy (03/13/2020); Gallbladder surgery; Laparoscopic total gastrectomy; and Replacement total knee (Left, 2020).     Family History: family history includes Alzheimer's disease in her maternal grandfather; Arthritis in her mother; Breast cancer in her mother; Cancer in her brother and mother; Diabetes in her brother, father, and mother; Esophageal cancer in her paternal grandmother; Heart disease in her brother and  mother; Hypertension in her brother and mother; Kidney failure in her mother; Lung cancer in her maternal grandmother; Stroke in her mother.     Social History: reports that she has never smoked. She has never used smokeless tobacco. She reports that she does not drink alcohol and does not use drugs.    Allergies: Patient has no known allergies.      Health Maintenance Due   Topic Date Due   • Pneumococcal Vaccine 65+ (1 - PCV) Never done   • TDAP/TD VACCINES (1 - Tdap) Never done   • HEPATITIS C SCREENING  Never done            Current Outpatient Medications:   •  acetaminophen (TYLENOL) 325 MG tablet, Take 650 mg by mouth Every 6 (Six) Hours As Needed., Disp: , Rfl:   •  Calcium Carb-Cholecalciferol (Os-Nick Calcium + D3) 500-200 MG-UNIT tablet, Take 1 tablet by mouth 2 (Two) Times a Day., Disp: 180 tablet, Rfl: 1  •  celecoxib (CeleBREX) 200 MG capsule, Take 1 capsule by mouth Daily., Disp: 90 capsule, Rfl: 1  •  cyanocobalamin 1000 MCG/ML injection, , Disp: , Rfl:   •  Diclofenac Sodium (VOLTAREN) 1 % gel gel, Apply 2 g topically to the appropriate area as directed 4 (Four) Times a Day As Needed (knee pain)., Disp: 100 g, Rfl: 1  •  estrogens, conjugated, (PREMARIN) 0.625 MG tablet, Take 0.625 mg by mouth Daily. Take daily for 21 days then do not take for 7 days., Disp: , Rfl:   •  folic acid (FOLVITE) 1 MG tablet, Take 1 tablet by mouth Daily., Disp: 90 tablet, Rfl: 3  •  Iron-Vitamin C (Vitron-C)  MG tablet, Vitron-C 65 mg iron- 125 mg oral tablet,delayed release (DR/EC) take 1 tablet by oral route daily   Active, Disp: , Rfl:   •  lisinopril (PRINIVIL,ZESTRIL) 10 MG tablet, Take 1 tablet by mouth Daily., Disp: 90 tablet, Rfl: 3  •  loratadine (CLARITIN) 10 MG tablet, Take 1 tablet by mouth Daily., Disp: 90 tablet, Rfl: 3  •  olopatadine (PATANOL) 0.1 % ophthalmic solution, 1 drop., Disp: , Rfl:   •  pantoprazole (PROTONIX) 40 MG EC tablet, Take 1 tablet by mouth Daily., Disp: 90 tablet, Rfl: 1  •   "Premarin 0.625 MG/GM vaginal cream, Insert 2 g into the vagina As Needed (Vaginal irritation)., Disp: 30 g, Rfl: 2  •  PreserVision AREDS tablet tablet, , Disp: , Rfl:   •  Pyridoxine HCl (VITAMIN B-6 PO), , Disp: , Rfl:   •  zolpidem CR (AMBIEN CR) 6.25 MG CR tablet, Take 1 tablet by mouth At Night As Needed for Sleep., Disp: 90 tablet, Rfl: 0  •  amoxicillin-clavulanate (Augmentin) 875-125 MG per tablet, Take 1 tablet by mouth 2 (Two) Times a Day for 10 days., Disp: 20 tablet, Rfl: 0  •  benzonatate (TESSALON) 200 MG capsule, Take 1 capsule by mouth 3 (Three) Times a Day As Needed for Cough for up to 7 days., Disp: 21 capsule, Rfl: 0  •  famotidine (PEPCID) 40 MG tablet, Take 40 mg by mouth Daily., Disp: , Rfl:   •  Multiple Vitamins-Minerals (PRESERVISION AREDS 2 PO), PreserVision AREDS 7,160-113-100 unit-mg-unit oral tablet take 1 tablet by oral route 2 times a day   Active, Disp: , Rfl:     Current Facility-Administered Medications:   •  methylPREDNISolone sodium succinate (SOLU-Medrol) injection 125 mg, 125 mg, Intramuscular, Once, Jeaneth Barker APRN      Immunization History   Administered Date(s) Administered   • COVID-19 (PFIZER) PURPLE CAP 03/10/2021, 03/31/2021, 10/22/2021   • Covid-19 (Pfizer) Gray Cap 04/16/2022   • Flu Vaccine Quad PF >36MO 09/26/2020   • FluLaval/Fluarix/Fluzone >6 09/17/2021   • Fluzone Split Quad (Multi-dose) 09/26/2020   • Shingrix 12/19/2021         Objective       Vitals:    06/03/22 0903   BP: 129/72   Pulse: 80   SpO2: 97%   Weight: 95.3 kg (210 lb)   Height: 158.8 cm (62.52\")      Body mass index is 37.77 kg/m².   Wt Readings from Last 3 Encounters:   06/03/22 95.3 kg (210 lb)   03/02/22 97.5 kg (215 lb)   12/09/21 96.1 kg (211 lb 12.8 oz)      BP Readings from Last 3 Encounters:   06/03/22 129/72   03/02/22 129/62   12/09/21 138/87        Physical Exam  Vitals reviewed.   Constitutional:       Appearance: Normal appearance. She is well-developed.   HENT:      Head: " Normocephalic and atraumatic.   Eyes:      Conjunctiva/sclera:      Right eye: Right conjunctiva is injected.      Left eye: Left conjunctiva is injected.      Pupils: Pupils are equal, round, and reactive to light.   Cardiovascular:      Rate and Rhythm: Normal rate and regular rhythm.      Heart sounds: Normal heart sounds. No murmur heard.  Pulmonary:      Effort: Pulmonary effort is normal.      Breath sounds: Examination of the right-middle field reveals decreased breath sounds. Examination of the right-lower field reveals decreased breath sounds. Decreased breath sounds present. No wheezing or rhonchi.   Abdominal:      General: Bowel sounds are normal. There is no distension.      Palpations: Abdomen is soft.      Tenderness: There is no abdominal tenderness.   Skin:     General: Skin is warm and dry.   Neurological:      Mental Status: She is alert and oriented to person, place, and time.   Psychiatric:         Mood and Affect: Mood and affect normal.         Behavior: Behavior normal.         Thought Content: Thought content normal.         Judgment: Judgment normal.         Result Review :     Common labs    Common Labsle 9/16/21 9/16/21 9/16/21 3/16/22 4/29/22    1033 1033 1033     Glucose   85  91   BUN   19  17   Creatinine   1.15 (A) 1.10 1.00   eGFR Non African Am   48 (A)     Sodium   131 (A)  134 (A)   Potassium   4.7  4.5   Chloride   99  102   Calcium   9.3  8.6   Albumin   3.90  3.60   Total Bilirubin   0.3  0.4   Alkaline Phosphatase   86  73   AST (SGOT)   32  34 (A)   ALT (SGPT)   21  26   WBC 8.04       Hemoglobin 12.2       Hematocrit 37.8       Platelets 322       Total Cholesterol  169      Triglycerides  132      HDL Cholesterol  59      LDL Cholesterol   87      (A) Abnormal value       Comments are available for some flowsheets but are not being displayed.                        Assessment and Plan        Diagnoses and all orders for this visit:    1. Bronchitis (Primary)  -      amoxicillin-clavulanate (Augmentin) 875-125 MG per tablet; Take 1 tablet by mouth 2 (Two) Times a Day for 10 days.  Dispense: 20 tablet; Refill: 0  -     benzonatate (TESSALON) 200 MG capsule; Take 1 capsule by mouth 3 (Three) Times a Day As Needed for Cough for up to 7 days.  Dispense: 21 capsule; Refill: 0  -     methylPREDNISolone sodium succinate (SOLU-Medrol) injection 125 mg    2. Primary insomnia  -     zolpidem CR (AMBIEN CR) 6.25 MG CR tablet; Take 1 tablet by mouth At Night As Needed for Sleep.  Dispense: 90 tablet; Refill: 0      Increase water. Counseled on injection.     Follow Up     Return in about 3 months (around 9/3/2022) for Next scheduled follow up.    Patient was given instructions and counseling regarding her condition or for health maintenance advice. Please see specific information pulled into the AVS if appropriate.     GUNJAN Malloy

## 2022-06-03 NOTE — PATIENT INSTRUCTIONS
Insomnia  Insomnia is a sleep disorder that makes it difficult to fall asleep or stay asleep. Insomnia can cause fatigue, low energy, difficulty concentrating, mood swings, and poor performance at work or school.  There are three different ways to classify insomnia:  Difficulty falling asleep.  Difficulty staying asleep.  Waking up too early in the morning.  Any type of insomnia can be long-term (chronic) or short-term (acute). Both are common. Short-term insomnia usually lasts for three months or less. Chronic insomnia occurs at least three times a week for longer than three months.  What are the causes?  Insomnia may be caused by another condition, situation, or substance, such as:  Anxiety.  Certain medicines.  Gastroesophageal reflux disease (GERD) or other gastrointestinal conditions.  Asthma or other breathing conditions.  Restless legs syndrome, sleep apnea, or other sleep disorders.  Chronic pain.  Menopause.  Stroke.  Abuse of alcohol, tobacco, or illegal drugs.  Mental health conditions, such as depression.  Caffeine.  Neurological disorders, such as Alzheimer's disease.  An overactive thyroid (hyperthyroidism).  Sometimes, the cause of insomnia may not be known.  What increases the risk?  Risk factors for insomnia include:  Gender. Women are affected more often than men.  Age. Insomnia is more common as you get older.  Stress.  Lack of exercise.  Irregular work schedule or working night shifts.  Traveling between different time zones.  Certain medical and mental health conditions.  What are the signs or symptoms?  If you have insomnia, the main symptom is having trouble falling asleep or having trouble staying asleep. This may lead to other symptoms, such as:  Feeling fatigued or having low energy.  Feeling nervous about going to sleep.  Not feeling rested in the morning.  Having trouble concentrating.  Feeling irritable, anxious, or depressed.  How is this diagnosed?  This condition may be diagnosed  based on:  Your symptoms and medical history. Your health care provider may ask about:  Your sleep habits.  Any medical conditions you have.  Your mental health.  A physical exam.  How is this treated?  Treatment for insomnia depends on the cause. Treatment may focus on treating an underlying condition that is causing insomnia. Treatment may also include:  Medicines to help you sleep.  Counseling or therapy.  Lifestyle adjustments to help you sleep better.  Follow these instructions at home:  Eating and drinking    Limit or avoid alcohol, caffeinated beverages, and cigarettes, especially close to bedtime. These can disrupt your sleep.  Do not eat a large meal or eat spicy foods right before bedtime. This can lead to digestive discomfort that can make it hard for you to sleep.    Sleep habits    Keep a sleep diary to help you and your health care provider figure out what could be causing your insomnia. Write down:  When you sleep.  When you wake up during the night.  How well you sleep.  How rested you feel the next day.  Any side effects of medicines you are taking.  What you eat and drink.  Make your bedroom a dark, comfortable place where it is easy to fall asleep.  Put up shades or blackout curtains to block light from outside.  Use a white noise machine to block noise.  Keep the temperature cool.  Limit screen use before bedtime. This includes:  Watching TV.  Using your smartphone, tablet, or computer.  Stick to a routine that includes going to bed and waking up at the same times every day and night. This can help you fall asleep faster. Consider making a quiet activity, such as reading, part of your nighttime routine.  Try to avoid taking naps during the day so that you sleep better at night.  Get out of bed if you are still awake after 15 minutes of trying to sleep. Keep the lights down, but try reading or doing a quiet activity. When you feel sleepy, go back to bed.    General instructions  Take  over-the-counter and prescription medicines only as told by your health care provider.  Exercise regularly, as told by your health care provider. Avoid exercise starting several hours before bedtime.  Use relaxation techniques to manage stress. Ask your health care provider to suggest some techniques that may work well for you. These may include:  Breathing exercises.  Routines to release muscle tension.  Visualizing peaceful scenes.  Make sure that you drive carefully. Avoid driving if you feel very sleepy.  Keep all follow-up visits as told by your health care provider. This is important.  Contact a health care provider if:  You are tired throughout the day.  You have trouble in your daily routine due to sleepiness.  You continue to have sleep problems, or your sleep problems get worse.  Get help right away if:  You have serious thoughts about hurting yourself or someone else.  If you ever feel like you may hurt yourself or others, or have thoughts about taking your own life, get help right away. You can go to your nearest emergency department or call:  Your local emergency services (911 in the [place]  A suicide crisis helpline, such as the National Suicide Prevention Lifeline at 1-484.564.5265. This is open 24 hours a day.  Summary  Insomnia is a sleep disorder that makes it difficult to fall asleep or stay asleep.  Insomnia can be long-term (chronic) or short-term (acute).  Treatment for insomnia depends on the cause. Treatment may focus on treating an underlying condition that is causing insomnia.  Keep a sleep diary to help you and your health care provider figure out what could be causing your insomnia.  This information is not intended to replace advice given to you by your health care provider. Make sure you discuss any questions you have with your health care provider.  Document Revised: 10/28/2021 Document Reviewed: 10/28/2021  [employer]vier Patient Education © 2021 [employer]

## 2022-06-21 ENCOUNTER — TRANSCRIBE ORDERS (OUTPATIENT)
Dept: ADMINISTRATIVE | Facility: HOSPITAL | Age: 67
End: 2022-06-21

## 2022-06-21 DIAGNOSIS — R59.1 LYMPHADENOPATHY: ICD-10-CM

## 2022-06-21 DIAGNOSIS — D47.2 MONOCLONAL PARAPROTEINEMIA: ICD-10-CM

## 2022-06-21 DIAGNOSIS — C88.4 MALT LYMPHOMA: Primary | ICD-10-CM

## 2022-07-21 ENCOUNTER — HOSPITAL ENCOUNTER (OUTPATIENT)
Dept: CT IMAGING | Facility: HOSPITAL | Age: 67
Discharge: HOME | End: 2022-07-21
Admitting: INTERNAL MEDICINE

## 2022-07-21 DIAGNOSIS — D47.2 MONOCLONAL PARAPROTEINEMIA: ICD-10-CM

## 2022-07-21 DIAGNOSIS — C88.4 MALT LYMPHOMA: ICD-10-CM

## 2022-07-21 DIAGNOSIS — R59.1 LYMPHADENOPATHY: ICD-10-CM

## 2022-07-21 LAB
CREAT BLDA-MCNC: 1.2 MG/DL
EGFRCR SERPLBLD CKD-EPI 2021: 50.3 ML/MIN/1.73

## 2022-07-21 PROCEDURE — 70491 CT SOFT TISSUE NECK W/DYE: CPT

## 2022-07-21 PROCEDURE — 0 IOPAMIDOL PER 1 ML: Performed by: INTERNAL MEDICINE

## 2022-07-21 PROCEDURE — 71260 CT THORAX DX C+: CPT

## 2022-07-21 PROCEDURE — 82565 ASSAY OF CREATININE: CPT

## 2022-07-21 PROCEDURE — 74177 CT ABD & PELVIS W/CONTRAST: CPT

## 2022-07-21 RX ADMIN — IOPAMIDOL 150 ML: 755 INJECTION, SOLUTION INTRAVENOUS at 13:06

## 2022-08-29 ENCOUNTER — OFFICE VISIT (OUTPATIENT)
Dept: FAMILY MEDICINE CLINIC | Facility: CLINIC | Age: 67
End: 2022-08-29

## 2022-08-29 VITALS
OXYGEN SATURATION: 99 % | BODY MASS INDEX: 37.67 KG/M2 | HEART RATE: 81 BPM | DIASTOLIC BLOOD PRESSURE: 81 MMHG | TEMPERATURE: 97.8 F | SYSTOLIC BLOOD PRESSURE: 132 MMHG | WEIGHT: 209.4 LBS

## 2022-08-29 DIAGNOSIS — J30.1 SEASONAL ALLERGIC RHINITIS DUE TO POLLEN: ICD-10-CM

## 2022-08-29 DIAGNOSIS — I10 ESSENTIAL HYPERTENSION: ICD-10-CM

## 2022-08-29 DIAGNOSIS — N95.9 MENOPAUSAL DISORDER: ICD-10-CM

## 2022-08-29 DIAGNOSIS — C80.1 CANCER: ICD-10-CM

## 2022-08-29 DIAGNOSIS — G89.29 CHRONIC PAIN OF LEFT KNEE: ICD-10-CM

## 2022-08-29 DIAGNOSIS — K21.9 GASTROESOPHAGEAL REFLUX DISEASE WITHOUT ESOPHAGITIS: ICD-10-CM

## 2022-08-29 DIAGNOSIS — Z78.0 POSTMENOPAUSAL: ICD-10-CM

## 2022-08-29 DIAGNOSIS — M05.79 RHEUMATOID ARTHRITIS INVOLVING MULTIPLE SITES WITH POSITIVE RHEUMATOID FACTOR: Primary | ICD-10-CM

## 2022-08-29 DIAGNOSIS — M25.562 CHRONIC PAIN OF LEFT KNEE: ICD-10-CM

## 2022-08-29 DIAGNOSIS — F51.01 PRIMARY INSOMNIA: ICD-10-CM

## 2022-08-29 PROCEDURE — 99214 OFFICE O/P EST MOD 30 MIN: CPT | Performed by: NURSE PRACTITIONER

## 2022-08-29 RX ORDER — EPINEPHRINE 0.3 MG/.3ML
INJECTION SUBCUTANEOUS
COMMUNITY
Start: 2022-08-26

## 2022-08-29 RX ORDER — FAMOTIDINE 40 MG/1
40 TABLET, FILM COATED ORAL DAILY
Qty: 90 TABLET | Refills: 2 | Status: SHIPPED | OUTPATIENT
Start: 2022-08-29 | End: 2022-11-23 | Stop reason: SDUPTHER

## 2022-08-29 RX ORDER — MONTELUKAST SODIUM 10 MG/1
TABLET ORAL
COMMUNITY
Start: 2022-07-22 | End: 2022-11-23

## 2022-08-29 RX ORDER — AZELASTINE 1 MG/ML
SPRAY, METERED NASAL
COMMUNITY
Start: 2022-07-22

## 2022-08-29 RX ORDER — CELECOXIB 200 MG/1
200 CAPSULE ORAL DAILY
Qty: 90 CAPSULE | Refills: 1 | Status: SHIPPED | OUTPATIENT
Start: 2022-08-29 | End: 2022-11-23 | Stop reason: SDUPTHER

## 2022-08-29 RX ORDER — LORATADINE 10 MG/1
10 TABLET ORAL DAILY
Qty: 90 TABLET | Refills: 3 | Status: SHIPPED | OUTPATIENT
Start: 2022-08-29 | End: 2023-02-23

## 2022-08-29 RX ORDER — PREDNISONE 20 MG/1
TABLET ORAL
COMMUNITY
Start: 2022-07-22 | End: 2022-11-23

## 2022-08-29 RX ORDER — FLUTICASONE PROPIONATE 50 MCG
SPRAY, SUSPENSION (ML) NASAL
COMMUNITY
Start: 2022-07-22

## 2022-08-29 RX ORDER — FOLIC ACID 1 MG/1
1 TABLET ORAL DAILY
Qty: 90 TABLET | Refills: 3 | Status: SHIPPED | OUTPATIENT
Start: 2022-08-29 | End: 2022-11-23 | Stop reason: SDUPTHER

## 2022-08-29 RX ORDER — PANTOPRAZOLE SODIUM 40 MG/1
40 TABLET, DELAYED RELEASE ORAL DAILY
Qty: 90 TABLET | Refills: 1 | Status: SHIPPED | OUTPATIENT
Start: 2022-08-29 | End: 2022-11-23 | Stop reason: SDUPTHER

## 2022-08-29 RX ORDER — CONJUGATED ESTROGENS 0.62 MG/G
2 CREAM VAGINAL AS NEEDED
Qty: 30 G | Refills: 2 | Status: SHIPPED | OUTPATIENT
Start: 2022-08-29 | End: 2022-11-23 | Stop reason: SDUPTHER

## 2022-08-29 RX ORDER — FAMOTIDINE 20 MG/1
TABLET, FILM COATED ORAL
COMMUNITY
Start: 2022-07-22 | End: 2022-08-29 | Stop reason: SDUPTHER

## 2022-08-29 RX ORDER — ZOLPIDEM TARTRATE 6.25 MG/1
6.25 TABLET, FILM COATED, EXTENDED RELEASE ORAL NIGHTLY PRN
Qty: 90 TABLET | Refills: 0 | Status: SHIPPED | OUTPATIENT
Start: 2022-08-29 | End: 2022-11-23 | Stop reason: SDUPTHER

## 2022-08-29 RX ORDER — LISINOPRIL 10 MG/1
10 TABLET ORAL DAILY
Qty: 90 TABLET | Refills: 3 | Status: SHIPPED | OUTPATIENT
Start: 2022-08-29 | End: 2022-11-23 | Stop reason: SDUPTHER

## 2022-08-29 RX ORDER — CETIRIZINE HYDROCHLORIDE 10 MG/1
TABLET ORAL
COMMUNITY
Start: 2022-07-22 | End: 2022-11-23

## 2022-08-29 NOTE — PATIENT INSTRUCTIONS
Insomnia  Insomnia is a sleep disorder that makes it difficult to fall asleep or stay asleep. Insomnia can cause fatigue, low energy, difficulty concentrating, mood swings, and poor performance at work or school.  There are three different ways to classify insomnia:  Difficulty falling asleep.  Difficulty staying asleep.  Waking up too early in the morning.  Any type of insomnia can be long-term (chronic) or short-term (acute). Both are common. Short-term insomnia usually lasts for three months or less. Chronic insomnia occurs at least three times a week for longer than three months.  What are the causes?  Insomnia may be caused by another condition, situation, or substance, such as:  Anxiety.  Certain medicines.  Gastroesophageal reflux disease (GERD) or other gastrointestinal conditions.  Asthma or other breathing conditions.  Restless legs syndrome, sleep apnea, or other sleep disorders.  Chronic pain.  Menopause.  Stroke.  Abuse of alcohol, tobacco, or illegal drugs.  Mental health conditions, such as depression.  Caffeine.  Neurological disorders, such as Alzheimer's disease.  An overactive thyroid (hyperthyroidism).  Sometimes, the cause of insomnia may not be known.  What increases the risk?  Risk factors for insomnia include:  Gender. Women are affected more often than men.  Age. Insomnia is more common as you get older.  Stress.  Lack of exercise.  Irregular work schedule or working night shifts.  Traveling between different time zones.  Certain medical and mental health conditions.  What are the signs or symptoms?  If you have insomnia, the main symptom is having trouble falling asleep or having trouble staying asleep. This may lead to other symptoms, such as:  Feeling fatigued or having low energy.  Feeling nervous about going to sleep.  Not feeling rested in the morning.  Having trouble concentrating.  Feeling irritable, anxious, or depressed.  How is this diagnosed?  This condition may be diagnosed  based on:  Your symptoms and medical history. Your health care provider may ask about:  Your sleep habits.  Any medical conditions you have.  Your mental health.  A physical exam.  How is this treated?  Treatment for insomnia depends on the cause. Treatment may focus on treating an underlying condition that is causing insomnia. Treatment may also include:  Medicines to help you sleep.  Counseling or therapy.  Lifestyle adjustments to help you sleep better.  Follow these instructions at home:  Eating and drinking    Limit or avoid alcohol, caffeinated beverages, and cigarettes, especially close to bedtime. These can disrupt your sleep.  Do not eat a large meal or eat spicy foods right before bedtime. This can lead to digestive discomfort that can make it hard for you to sleep.    Sleep habits    Keep a sleep diary to help you and your health care provider figure out what could be causing your insomnia. Write down:  When you sleep.  When you wake up during the night.  How well you sleep.  How rested you feel the next day.  Any side effects of medicines you are taking.  What you eat and drink.  Make your bedroom a dark, comfortable place where it is easy to fall asleep.  Put up shades or blackout curtains to block light from outside.  Use a white noise machine to block noise.  Keep the temperature cool.  Limit screen use before bedtime. This includes:  Watching TV.  Using your smartphone, tablet, or computer.  Stick to a routine that includes going to bed and waking up at the same times every day and night. This can help you fall asleep faster. Consider making a quiet activity, such as reading, part of your nighttime routine.  Try to avoid taking naps during the day so that you sleep better at night.  Get out of bed if you are still awake after 15 minutes of trying to sleep. Keep the lights down, but try reading or doing a quiet activity. When you feel sleepy, go back to bed.    General instructions  Take  over-the-counter and prescription medicines only as told by your health care provider.  Exercise regularly, as told by your health care provider. Avoid exercise starting several hours before bedtime.  Use relaxation techniques to manage stress. Ask your health care provider to suggest some techniques that may work well for you. These may include:  Breathing exercises.  Routines to release muscle tension.  Visualizing peaceful scenes.  Make sure that you drive carefully. Avoid driving if you feel very sleepy.  Keep all follow-up visits as told by your health care provider. This is important.  Contact a health care provider if:  You are tired throughout the day.  You have trouble in your daily routine due to sleepiness.  You continue to have sleep problems, or your sleep problems get worse.  Get help right away if:  You have serious thoughts about hurting yourself or someone else.  If you ever feel like you may hurt yourself or others, or have thoughts about taking your own life, get help right away. You can go to your nearest emergency department or call:  Your local emergency services (911 in the [place]  A suicide crisis helpline, such as the National Suicide Prevention Lifeline at 1-960.545.4441. This is open 24 hours a day.  Summary  Insomnia is a sleep disorder that makes it difficult to fall asleep or stay asleep.  Insomnia can be long-term (chronic) or short-term (acute).  Treatment for insomnia depends on the cause. Treatment may focus on treating an underlying condition that is causing insomnia.  Keep a sleep diary to help you and your health care provider figure out what could be causing your insomnia.  This information is not intended to replace advice given to you by your health care provider. Make sure you discuss any questions you have with your health care provider.  Document Revised: 10/28/2021 Document Reviewed: 10/28/2021  Elsevier Patient Education © 2021 [employer]

## 2022-08-29 NOTE — PROGRESS NOTES
Chief Complaint  Hypertension and Heartburn    Subjective          Jeimy Willard is a 67 y.o. female who presents to [facility]    History of Present Illness    GERD-patient reports reflux is improved with the addition of the Pepcid.    Insomnia-patient reports sleeping well no medication side effects.    RA-pain is improved with Celebrex.    Hypertension-blood pressures well controlled.    Menopausal disorder - pt reports vaginal dryness is improved but persists with med.     Will hold pneumonia vaccine d/t cluster shot with allergist.     PHQ-2 Total Score: 0   PHQ-9 Total Score: 0        Review of Systems   Constitutional: Negative for chills, fatigue and fever.   Respiratory: Negative for cough and shortness of breath.    Cardiovascular: Negative for chest pain and palpitations.   Gastrointestinal: Negative for constipation, diarrhea, nausea and vomiting.   Musculoskeletal: Negative for back pain and neck pain.   Skin: Negative for rash.   Neurological: Negative for dizziness and headaches.   Psychiatric/Behavioral: Positive for sleep disturbance.          Medical History: has a past medical history of Anemia, Arthritis, Cancer ([facility]), GERD (gastroesophageal reflux disease), Hypertension, Insomnia, Lymphoma ([facility]) (1999), Osteoporosis, Seasonal allergies, Stomach cancer ([facility]) (2005), Stomach disorder, Ulcer (traumatic) of oral mucosa, and Vitamin D deficiency.     Surgical History: has a past surgical history that includes Cholecystectomy; Colonoscopy (02/20/2019); Esophagogastroduodenoscopy (03/13/2020); Gallbladder surgery; Laparoscopic total gastrectomy; and Replacement total knee (Left, 2020).     Family History: family history includes Alzheimer's disease in her maternal grandfather; Arthritis in her mother; Breast cancer in her mother; Cancer in her brother and mother; Diabetes in her brother, father, and mother; Esophageal cancer in her paternal grandmother; Heart disease  in her brother and mother; Hypertension in her brother and mother; Kidney failure in her mother; Lung cancer in her maternal grandmother; Stroke in her mother.     Social History: reports that she has never smoked. She has never used smokeless tobacco. She reports that she does not drink alcohol and does not use drugs.    Allergies: Heparin      Health Maintenance Due   Topic Date Due   • Pneumococcal Vaccine 65+ (1 - PCV) Never done   • TDAP/TD VACCINES (1 - Tdap) Never done   • HEPATITIS C SCREENING  Never done   • COVID-19 Vaccine (5 - Booster for Pfizer series) 08/16/2022            Current Outpatient Medications:   •  azelastine (ASTELIN) 0.1 % nasal spray, , Disp: , Rfl:   •  Calcium Carb-Cholecalciferol (Os-Nikc Calcium + D3) 500-200 MG-UNIT tablet, Take 1 tablet by mouth 2 (Two) Times a Day., Disp: 180 tablet, Rfl: 1  •  celecoxib (CeleBREX) 200 MG capsule, Take 1 capsule by mouth Daily., Disp: 90 capsule, Rfl: 1  •  cetirizine (zyrTEC) 10 MG tablet, , Disp: , Rfl:   •  cyanocobalamin 1000 MCG/ML injection, , Disp: , Rfl:   •  Diclofenac Sodium (VOLTAREN) 1 % gel gel, Apply 2 g topically to the appropriate area as directed 4 (Four) Times a Day As Needed (knee pain)., Disp: 100 g, Rfl: 1  •  famotidine (PEPCID) 40 MG tablet, Take 1 tablet by mouth Daily., Disp: 90 tablet, Rfl: 2  •  fluticasone (FLONASE) 50 MCG/ACT nasal spray, , Disp: , Rfl:   •  folic acid (FOLVITE) 1 MG tablet, Take 1 tablet by mouth Daily., Disp: 90 tablet, Rfl: 3  •  Iron-Vitamin C (Vitron-C)  MG tablet, Vitron-C 65 mg iron- 125 mg oral tablet,delayed release (DR/EC) take 1 tablet by oral route daily   Active, Disp: , Rfl:   •  lisinopril (PRINIVIL,ZESTRIL) 10 MG tablet, Take 1 tablet by mouth Daily., Disp: 90 tablet, Rfl: 3  •  loratadine (CLARITIN) 10 MG tablet, Take 1 tablet by mouth Daily., Disp: 90 tablet, Rfl: 3  •  montelukast (SINGULAIR) 10 MG tablet, , Disp: , Rfl:   •  Multiple Vitamins-Minerals (PRESERVISION AREDS 2 PO),  PreserVision AREDS [phone] unit-mg-unit oral tablet take 1 tablet by oral route 2 times a day   Active, Disp: , Rfl:   •  olopatadine (PATANOL) 0.1 % ophthalmic solution, 1 drop., Disp: , Rfl:   •  pantoprazole (PROTONIX) 40 MG EC tablet, Take 1 tablet by mouth Daily., Disp: 90 tablet, Rfl: 1  •  predniSONE (DELTASONE) 20 MG tablet, , Disp: , Rfl:   •  Premarin 0.625 MG/GM vaginal cream, Insert 2 g into the vagina As Needed (Vaginal irritation)., Disp: 30 g, Rfl: 2  •  Pyridoxine HCl (VITAMIN B-6 PO), , Disp: , Rfl:   •  zolpidem CR (AMBIEN CR) 6.25 MG CR tablet, Take 1 tablet by mouth At Night As Needed for Sleep., Disp: 90 tablet, Rfl: 0  •  acetaminophen (TYLENOL) 325 MG tablet, Take 650 mg by mouth Every 6 (Six) Hours As Needed., Disp: , Rfl:   •  EPINEPHrine (EPIPEN) 0.3 MG/0.3ML solution auto-injector injection, , Disp: , Rfl:       Immunization History   Administered Date(s) Administered   • COVID-19 (PFIZER) PURPLE CAP 03/10/2021, 03/31/2021, 10/22/2021   • Covid-19 (Pfizer) Gray Cap 04/16/2022   • Flu Vaccine Quad PF >36MO 09/26/2020   • FluLaval/Fluzone >6mos 09/17/2021   • Fluzone Quad >6mos (Multi-dose) 09/26/2020   • Shingrix 12/19/2021         Objective       Vitals:    08/29/22 0829   BP: 132/81   Pulse: 81   Temp: 97.8 °F (36.6 °C)   TempSrc: Temporal   SpO2: 99%   Weight: 95 kg (209 lb 6.4 oz)      Body mass index is 37.67 kg/m².   Wt Readings from Last 3 Encounters:   08/29/22 95 kg (209 lb 6.4 oz)   06/03/22 95.3 kg (210 lb)   03/02/22 97.5 kg (215 lb)      BP Readings from Last 3 Encounters:   08/29/22 132/81   06/03/22 129/72   03/02/22 129/62        Physical Exam  Vitals reviewed.   Constitutional:       Appearance: Normal appearance. She is well-developed.   HENT:      Head: Normocephalic and atraumatic.   Eyes:      Conjunctiva/sclera: Conjunctivae normal.      Pupils: Pupils are equal, round, and reactive to light.   Cardiovascular:      Rate and Rhythm: Normal rate and regular rhythm.       Heart sounds: Normal heart sounds. No murmur heard.  Pulmonary:      Effort: Pulmonary effort is normal.      Breath sounds: Normal breath sounds. No wheezing or rhonchi.   Abdominal:      General: Bowel sounds are normal. There is no distension.      Palpations: Abdomen is soft.      Tenderness: There is no abdominal tenderness.   Skin:     General: Skin is warm and dry.   Neurological:      Mental Status: She is alert and oriented to person, place, and time.   Psychiatric:         Mood and Affect: Mood and affect normal.         Behavior: Behavior normal.         Thought Content: Thought content normal.         Judgment: Judgment normal.             Result Review :       Common labs    Common Labsle 3/16/22 4/29/22 7/21/22   Glucose  91    BUN  17    Creatinine 1.10 1.00 1.20   Sodium  134 (A)    Potassium  4.5    Chloride  102    Calcium  8.6    Albumin  3.60    Total Bilirubin  0.4    Alkaline Phosphatase  73    AST (SGOT)  34 (A)    ALT (SGPT)  26    (A) Abnormal value       Comments are available for some flowsheets but are not being displayed.                            Assessment and Plan        Diagnoses and all orders for this visit:    1. Rheumatoid arthritis involving multiple sites with positive rheumatoid factor (HCC) (Primary)  -     celecoxib (CeleBREX) 200 MG capsule; Take 1 capsule by mouth Daily.  Dispense: 90 capsule; Refill: 1    2. Postmenopausal  -     Calcium Carb-Cholecalciferol (Os-Nick Calcium + D3) 500-200 MG-UNIT tablet; Take 1 tablet by mouth 2 (Two) Times a Day.  Dispense: 180 tablet; Refill: 1    3. Chronic pain of left knee  -     Diclofenac Sodium (VOLTAREN) 1 % gel gel; Apply 2 g topically to the appropriate area as directed 4 (Four) Times a Day As Needed (knee pain).  Dispense: 100 g; Refill: 1    4. Cancer (HCC)  -     folic acid (FOLVITE) 1 MG tablet; Take 1 tablet by mouth Daily.  Dispense: 90 tablet; Refill: 3    5. Essential hypertension  -     lisinopril  (PRINIVIL,ZESTRIL) 10 MG tablet; Take 1 tablet by mouth Daily.  Dispense: 90 tablet; Refill: 3    6. Gastroesophageal reflux disease without esophagitis  -     famotidine (PEPCID) 40 MG tablet; Take 1 tablet by mouth Daily.  Dispense: 90 tablet; Refill: 2  -     pantoprazole (PROTONIX) 40 MG EC tablet; Take 1 tablet by mouth Daily.  Dispense: 90 tablet; Refill: 1    7. Primary insomnia  -     zolpidem CR (AMBIEN CR) 6.25 MG CR tablet; Take 1 tablet by mouth At Night As Needed for Sleep.  Dispense: 90 tablet; Refill: 0    8. Menopausal disorder  -     Premarin 0.625 MG/GM vaginal cream; Insert 2 g into the vagina As Needed (Vaginal irritation).  Dispense: 30 g; Refill: 2    9. Seasonal allergic rhinitis due to pollen  -     loratadine (CLARITIN) 10 MG tablet; Take 1 tablet by mouth Daily.  Dispense: 90 tablet; Refill: 3          Follow Up     Return in about 3 months (around 11/29/2022) for Next scheduled follow up.     Obtained a written consent for LEESA query. Discussed the risks and benefits of the use of controlled substances with the patient, including the risk of tolerance and drug dependence.  The patient has been counseled on the need to have an exit strategy, including potentially discontinuing the use of controlled substances.  LEESA has or will be reviewed as soon as it becomes available.    Patient was given instructions and counseling regarding her condition or for health maintenance advice. Please see specific information pulled into the AVS if appropriate.     GUNJAN Malloy

## 2022-10-03 DIAGNOSIS — K11.8 PAROTID MASS: Primary | ICD-10-CM

## 2022-10-14 ENCOUNTER — OFFICE VISIT (OUTPATIENT)
Dept: OTOLARYNGOLOGY | Facility: CLINIC | Age: 67
End: 2022-10-14

## 2022-10-14 VITALS — HEIGHT: 63 IN | WEIGHT: 212.2 LBS | BODY MASS INDEX: 37.6 KG/M2 | TEMPERATURE: 97.6 F

## 2022-10-14 DIAGNOSIS — K11.8 PAROTID MASS: Primary | ICD-10-CM

## 2022-10-14 PROCEDURE — 99204 OFFICE O/P NEW MOD 45 MIN: CPT | Performed by: OTOLARYNGOLOGY

## 2022-10-14 RX ORDER — DIPHENHYDRAMINE HCL 25 MG
25 CAPSULE ORAL EVERY 6 HOURS PRN
COMMUNITY

## 2022-10-14 NOTE — PROGRESS NOTES
Patient Name: Jeimy Willard   Visit Date: 10/14/2022   Patient ID: [id number]  Provider: Ralph Marie MD    Sex: female  Location: [facility]   YOB: 1955  Location Address: [address] [place] [address], [address],?[place]?[address]    Primary Care Provider Barker Jeaneth APRMARLY  Location Phone: [phone]    Referring Provider: Mack Pavon MD        Chief Complaint  Parotid mass (New patient )    Subjective    History of Present Illness  Jeimy Willard is a 67 y.o. female who presents to [facility] today as a consult from Mack Pavon MD.    She presents to the clinic today for evaluation of of parotid mass that was initially noted on a PET scan performed for surveillance of lymphoma history.  Clinically, the patient has been doing well, has not had any issues with weight loss, fatigue, or any lymphadenopathy.  Her lymphoma diagnosis was more than 10 years ago, and it seems like she is doing well.  One of the findings on the PET scan was a left neck 1 cm lymph node with SUV of 2.7.  No other lymphadenopathy was noted, and reactive lymph node was favored given the general appearance by the radiologist.  The patient has had a subsequent CT scan performed 4 months later and this revealed a 13 mm nodule just inferior aspect of the left parotid gland, level 2 area that was thought to be a prominent inferiorly directed lobule of the superficial lobe of the parotid gland versus a lymph node or parotid mass.  I reviewed the imaging personally today along with her.  She denies any symptoms at the site, and has not had any pain.    Past Medical History:   Diagnosis Date   • Anemia    • Arthritis    • Cancer (HCC)    • Cholelithiasis 2000   • Colon polyp    • GERD (gastroesophageal reflux disease)    • History of transfusion    • Hypertension    • Insomnia    • Lymphoma (HCC) 1999    malignant   • Osteoporosis    • Seasonal allergies    • Stomach  cancer (HCC) 2005    malignant   • Stomach disorder    • Ulcer (traumatic) of oral mucosa    • Vitamin D deficiency        Past Surgical History:   Procedure Laterality Date   • CHOLECYSTECTOMY     • COLONOSCOPY  02/20/2019 2/20/19 poyp 6 mm in ascending colon,polyp 2mm in sigmoid colon,polypectomy,mild diverticulosos of sigmond colon,grade 1 internal hemorrhoids , repeat 3-5 years    • ENDOSCOPY  03/13/2020    normal mucosa in the whole esophagus, nodular tissue visualized at the esophago-jejunal anastomosis, afferent and efferent sm bowel limbs visualized  patchy erthema & single erosion visualized in one jejunal limb.no gastric tissue visualized     • GALLBLADDER SURGERY     • LAPAROSCOPIC TOTAL GASTRECTOMY     • REPLACEMENT TOTAL KNEE Left 2020         Current Outpatient Medications:   •  acetaminophen (TYLENOL) 325 MG tablet, Take 650 mg by mouth Every 6 (Six) Hours As Needed., Disp: , Rfl:   •  azelastine (ASTELIN) 0.1 % nasal spray, , Disp: , Rfl:   •  Calcium Carb-Cholecalciferol (Os-Nick Calcium + D3) 500-200 MG-UNIT tablet, Take 1 tablet by mouth 2 (Two) Times a Day., Disp: 180 tablet, Rfl: 1  •  cyanocobalamin 1000 MCG/ML injection, , Disp: , Rfl:   •  Diclofenac Sodium (VOLTAREN) 1 % gel gel, Apply 2 g topically to the appropriate area as directed 4 (Four) Times a Day As Needed (knee pain)., Disp: 100 g, Rfl: 1  •  diphenhydrAMINE (BENADRYL) 25 mg capsule, Take 1 capsule by mouth Every 6 (Six) Hours As Needed., Disp: , Rfl:   •  EPINEPHrine (EPIPEN) 0.3 MG/0.3ML solution auto-injector injection, , Disp: , Rfl:   •  fluticasone (FLONASE) 50 MCG/ACT nasal spray, , Disp: , Rfl:   •  folic acid (FOLVITE) 1 MG tablet, Take 1 tablet by mouth Daily., Disp: 90 tablet, Rfl: 3  •  Iron-Vitamin C (Vitron-C)  MG tablet, Vitron-C 65 mg iron- 125 mg oral tablet,delayed release (DR/EC) take 1 tablet by oral route daily   Active, Disp: , Rfl:   •  lisinopril (PRINIVIL,ZESTRIL) 10 MG tablet, Take 1 tablet by  mouth Daily., Disp: 90 tablet, Rfl: 3  •  loratadine (CLARITIN) 10 MG tablet, Take 1 tablet by mouth Daily., Disp: 90 tablet, Rfl: 3  •  Multiple Vitamins-Minerals (PRESERVISION AREDS 2 PO), PreserVision AREDS 7,160-113-100 unit-mg-unit oral tablet take 1 tablet by oral route 2 times a day   Active, Disp: , Rfl:   •  olopatadine (PATANOL) 0.1 % ophthalmic solution, 1 drop., Disp: , Rfl:   •  pantoprazole (PROTONIX) 40 MG EC tablet, Take 1 tablet by mouth Daily., Disp: 90 tablet, Rfl: 1  •  Premarin 0.625 MG/GM vaginal cream, Insert 2 g into the vagina As Needed (Vaginal irritation)., Disp: 30 g, Rfl: 2  •  Pyridoxine HCl (VITAMIN B-6 PO), , Disp: , Rfl:   •  zolpidem CR (AMBIEN CR) 6.25 MG CR tablet, Take 1 tablet by mouth At Night As Needed for Sleep., Disp: 90 tablet, Rfl: 0  •  celecoxib (CeleBREX) 200 MG capsule, Take 1 capsule by mouth Daily., Disp: 90 capsule, Rfl: 1  •  cetirizine (zyrTEC) 10 MG tablet, , Disp: , Rfl:   •  famotidine (PEPCID) 40 MG tablet, Take 1 tablet by mouth Daily., Disp: 90 tablet, Rfl: 2  •  montelukast (SINGULAIR) 10 MG tablet, , Disp: , Rfl:   •  predniSONE (DELTASONE) 20 MG tablet, , Disp: , Rfl:      Allergies   Allergen Reactions   • Heparin Other (See Comments)     Fever with chills       Family History   Problem Relation Age of Onset   • Breast cancer Mother    • Stroke Mother    • Heart disease Mother    • Hypertension Mother    • Cancer Mother         unspecified   • Diabetes Mother         unspecified type   • Kidney failure Mother    • Arthritis Mother    • Kidney disease Mother    • Miscarriages / Stillbirths Mother    • Heart failure Mother    • Diabetes Father         unspecified type   • Heart disease Brother    • Hypertension Brother    • Cancer Brother         unspecified   • Diabetes Brother         unspecified type   • Kidney disease Brother    • Lung cancer Maternal Grandmother         malignant   • Alzheimer's disease Maternal Grandfather    • Esophageal cancer  "Paternal Grandmother         malignant   • Miscarriages / Stillbirths Sister         Social History     Social History Narrative   • Not on file       Objective     Vital Signs:   Temp 97.6 °F (36.4 °C) (Tympanic)   Ht 160 cm (63\")   Wt 96.3 kg (212 lb 3.2 oz)   BMI 37.59 kg/m²       Physical Exam         Constitutional   Appearance  · : well developed, well-nourished, alert and in no acute distress, voice clear and strong    Head  Inspection  · : no deformities or lesions  Face  Inspection  · : No facial lesions; House-Brackmann I/VI bilaterally  Palpation  · : No TMJ crepitus nor  muscle tenderness bilaterally    Eyes  Vision  Visual Fields  · : Extraocular movements are intact. No spontaneous or gaze-induced nystagmus.  Conjunctivae  · : clear  Sclerae  · : clear  Pupils and Irises  · : pupils equal, round, and reactive to light.     Ears, Nose, Mouth and Throat    Ears    External Ears  · : appearance within normal limits, no lesions present  Otoscopic Examination  · : Tympanic membrane appearance within normal limits bilaterally without perforations, well-aerated middle ears  Hearing  · : intact to conversational voice both ears  Tunning fork testing:     :    Nose    External Nose  · : appearance normal  Intranasal Exam  · : mucosa within normal limits, vestibules normal, no intranasal lesions present, septum midline, sinuses non tender to percussion  Oral Cavity    Oral Mucosa  · : oral mucosa normal without pallor or cyanosis  Lips  · : lip appearance normal  Teeth  · : normal dentition for age  Gums  · : gums pink, non-swollen, no bleeding present  Tongue  · : tongue appearance normal; normal mobility  Palate  · : hard palate normal, soft palate appearance normal with symmetric mobility    Throat    Oropharynx  · : no inflammation or lesions present, tonsils within normal limits  Hypopharynx  · : appearance within normal limits, superior epiglottis within normal limits  Larynx  · : appearance " within normal limits, vocal cords within normal limits, no lesions present    Neck  Inspection/Palpation  · : normal appearance, palpable 1 cm lymph node versus parotid mass along the inferior aspect of the left parotid level 2 area of the neck without any other lymphadenopathy, trachea midline; thyroid size normal, nontender, no nodules or masses present on palpation    Respiratory  Respiratory Effort  · : breathing unlabored  Inspection of Chest  · : normal appearance, no retractions    Cardiovascular  Heart  · : regular rate and rhythm    Lymphatic  Neck  · : no lymphadenopathy present  Supraclavicular Nodes  · : no lymphadenopathy present  Preauricular Nodes  · : no lymphadenopathy present    Skin and Subcutaneous Tissue  General Inspection  · : Regarding face and neck - there are no rashes present, no lesions present, and no areas of discoloration    Neurologic  Cranial Nerves  · : cranial nerves II-XII are grossly intact bilaterally  Gait and Station  · : normal gait, able to stand without diffculty    Psychiatric  Judgement and Insight  · : judgment and insight intact  Mood and Affect  · : mood normal, affect appropriate          Assessment and Plan    Diagnoses and all orders for this visit:    1. Parotid mass (Primary)    Examination today revealed a left neck level 2 mass measuring about 1 cm that may be an inferior aspect parotid tumor versus lymph node.  Given the low-grade hypermetabolism on CT PET scan performed in March as well as the appearance of the CT scan, we discussed treatment options including but not limited to needle biopsy, excision via superficial parotidectomy, observation.  At this point she would strongly prefer not to have any invasive procedures, and would like to watch.  I have made an appointment for her to see me in 4 months, and have recommended that she do weekly neck exams and to contact me should there be any changes or other findings.    Follow Up   No follow-ups on  file.  Patient was given instructions and counseling regarding her condition or for health maintenance advice. Please see specific information pulled into the AVS if appropriate.

## 2022-10-20 ENCOUNTER — HOSPITAL ENCOUNTER (OUTPATIENT)
Dept: GENERAL RADIOLOGY | Facility: HOSPITAL | Age: 67
Discharge: HOME | End: 2022-10-20

## 2022-10-20 ENCOUNTER — TRANSCRIBE ORDERS (OUTPATIENT)
Dept: ADMINISTRATIVE | Facility: HOSPITAL | Age: 67
End: 2022-10-20

## 2022-10-20 DIAGNOSIS — D51.0 PERNICIOUS ANEMIA: ICD-10-CM

## 2022-10-20 DIAGNOSIS — D51.0 PERNICIOUS ANEMIA: Primary | ICD-10-CM

## 2022-10-20 DIAGNOSIS — M12.9 ACUTE ARTHROPATHY: ICD-10-CM

## 2022-10-20 PROCEDURE — 71100 X-RAY EXAM RIBS UNI 2 VIEWS: CPT

## 2022-10-20 PROCEDURE — 71046 X-RAY EXAM CHEST 2 VIEWS: CPT

## 2022-11-21 NOTE — PROGRESS NOTES
Chief Complaint  Hypertension (FOLLOW UP)    Subjective          Jeimy Willard is a 67 y.o. female who presents to [facility]    History of Present Illness    Htn - well controlled. Pt denies any dizziness. Pt reports wnl when checked at home.     Insomnia - pt is sleeping 6-8 hours and awakens rested. Denies any med se.    Gerd - well controlled with addition of Pepcid. Pt has HOB raised.     RA - improved arthralgia with Celebrex.     PHQ-2 Total Score: 0   PHQ-9 Total Score: 0        Review of Systems   Constitutional: Negative for chills, fatigue and fever.   Respiratory: Negative for cough and shortness of breath.    Cardiovascular: Negative for chest pain and palpitations.   Gastrointestinal: Negative for constipation, diarrhea, nausea and vomiting.   Musculoskeletal: Positive for arthralgias. Negative for back pain and neck pain.   Skin: Negative for rash.   Neurological: Negative for dizziness and headaches.   Psychiatric/Behavioral: Negative for sleep disturbance.          Medical History: has a past medical history of Anemia, Arthritis, Cancer ([facility]), Cholelithiasis (2000), Colon polyp, GERD (gastroesophageal reflux disease), History of transfusion, Hypertension, Insomnia, Lymphoma (HCC) (1999), Osteoporosis, Seasonal allergies, Stomach cancer (HCC) (2005), Stomach disorder, Ulcer (traumatic) of oral mucosa, and Vitamin D deficiency.     Surgical History: has a past surgical history that includes Cholecystectomy; Colonoscopy (02/20/2019); Esophagogastroduodenoscopy (03/13/2020); Gallbladder surgery; Laparoscopic total gastrectomy; and Replacement total knee (Left, 2020).     Family History: family history includes Alzheimer's disease in her maternal grandfather; Arthritis in her mother; Breast cancer in her mother; Cancer in her brother and mother; Diabetes in her brother, father, and mother; Esophageal cancer in her paternal grandmother; Heart disease in her brother and  mother; Heart failure in her mother; Hypertension in her brother and mother; Kidney disease in her brother and mother; Kidney failure in her mother; Lung cancer in her maternal grandmother; Miscarriages / Stillbirths in her mother and sister; Stroke in her mother.     Social History: reports that she has never smoked. She has never been exposed to tobacco smoke. She has never used smokeless tobacco. She reports that she does not drink alcohol and does not use drugs.    Allergies: Heparin      Health Maintenance Due   Topic Date Due   • Pneumococcal Vaccine 65+ (1 - PCV) Never done   • TDAP/TD VACCINES (1 - Tdap) Never done   • HEPATITIS C SCREENING  Never done   • COVID-19 Vaccine (5 - Booster for Pfizer series) 06/11/2022   • INFLUENZA VACCINE  08/01/2022            Current Outpatient Medications:   •  azelastine (ASTELIN) 0.1 % nasal spray, , Disp: , Rfl:   •  Calcium Carb-Cholecalciferol (Os-Nick Calcium + D3) 500-5 MG-MCG tablet per tablet, Take 1 tablet by mouth 2 (Two) Times a Day., Disp: 180 tablet, Rfl: 1  •  celecoxib (CeleBREX) 200 MG capsule, Take 1 capsule by mouth Daily., Disp: 90 capsule, Rfl: 1  •  cyanocobalamin 1000 MCG/ML injection, , Disp: , Rfl:   •  Diclofenac Sodium (VOLTAREN) 1 % gel gel, Apply 2 g topically to the appropriate area as directed 4 (Four) Times a Day As Needed (knee pain)., Disp: 100 g, Rfl: 1  •  diphenhydrAMINE (BENADRYL) 25 mg capsule, Take 1 capsule by mouth Every 6 (Six) Hours As Needed., Disp: , Rfl:   •  famotidine (PEPCID) 40 MG tablet, Take 1 tablet by mouth Daily., Disp: 90 tablet, Rfl: 2  •  fluticasone (FLONASE) 50 MCG/ACT nasal spray, , Disp: , Rfl:   •  folic acid (FOLVITE) 1 MG tablet, Take 1 tablet by mouth Daily., Disp: 90 tablet, Rfl: 3  •  Iron-Vitamin C (Vitron-C)  MG tablet, Vitron-C 65 mg iron- 125 mg oral tablet,delayed release (DR/EC) take 1 tablet by oral route daily   Active, Disp: , Rfl:   •  lisinopril (PRINIVIL,ZESTRIL) 10 MG tablet, Take 1  tablet by mouth Daily., Disp: 90 tablet, Rfl: 3  •  loratadine (CLARITIN) 10 MG tablet, Take 1 tablet by mouth Daily., Disp: 90 tablet, Rfl: 3  •  Multiple Vitamins-Minerals (PRESERVISION AREDS 2 PO), PreserVision AREDS 7,160-113-100 unit-mg-unit oral tablet take 1 tablet by oral route 2 times a day   Active, Disp: , Rfl:   •  olopatadine (PATANOL) 0.1 % ophthalmic solution, 1 drop., Disp: , Rfl:   •  pantoprazole (PROTONIX) 40 MG EC tablet, Take 1 tablet by mouth Daily., Disp: 90 tablet, Rfl: 1  •  Premarin 0.625 MG/GM vaginal cream, Insert 2 g into the vagina As Needed (Vaginal irritation)., Disp: 30 g, Rfl: 2  •  Pyridoxine HCl (VITAMIN B-6 PO), , Disp: , Rfl:   •  zolpidem CR (AMBIEN CR) 6.25 MG CR tablet, Take 1 tablet by mouth At Night As Needed for Sleep., Disp: 90 tablet, Rfl: 0  •  acetaminophen (TYLENOL) 325 MG tablet, Take 650 mg by mouth Every 6 (Six) Hours As Needed., Disp: , Rfl:   •  amoxicillin (AMOXIL) 500 MG capsule, , Disp: , Rfl:   •  Cough/Chest Congestion DM  MG/5ML syrup, , Disp: , Rfl:   •  EPINEPHrine (EPIPEN) 0.3 MG/0.3ML solution auto-injector injection, , Disp: , Rfl:       Immunization History   Administered Date(s) Administered   • COVID-19 (PFIZER) PURPLE CAP 03/10/2021, 03/31/2021, 10/22/2021   • Covid-19 (Pfizer) Gray Cap 04/16/2022   • Flu Vaccine Quad PF >36MO 09/26/2020   • FluLaval/Fluzone >6mos 09/17/2021   • FluMist 2-49yrs 09/26/2020   • Fluzone Quad >6mos (Multi-dose) 09/26/2020   • Shingrix 12/19/2021, 03/11/2022         Objective       Vitals:    11/23/22 0746   BP: 100/67   Pulse: 88   Temp: 97.9 °F (36.6 °C)   TempSrc: Temporal   SpO2: 98%   Weight: 95.2 kg (209 lb 12.8 oz)      Body mass index is 37.16 kg/m².   Wt Readings from Last 3 Encounters:   11/23/22 95.2 kg (209 lb 12.8 oz)   10/14/22 96.3 kg (212 lb 3.2 oz)   10/03/22 95.3 kg (210 lb 3.2 oz)      BP Readings from Last 3 Encounters:   11/23/22 100/67   08/29/22 132/81   06/03/22 129/72        Physical  Exam  Vitals reviewed.   Constitutional:       Appearance: Normal appearance. She is well-developed.   HENT:      Head: Normocephalic and atraumatic.   Eyes:      Conjunctiva/sclera: Conjunctivae normal.      Pupils: Pupils are equal, round, and reactive to light.   Cardiovascular:      Rate and Rhythm: Normal rate and regular rhythm.      Heart sounds: Normal heart sounds. No murmur heard.  Pulmonary:      Effort: Pulmonary effort is normal.      Breath sounds: Normal breath sounds. No wheezing or rhonchi.   Abdominal:      General: Bowel sounds are normal. There is no distension.      Palpations: Abdomen is soft.      Tenderness: There is no abdominal tenderness.   Skin:     General: Skin is warm and dry.   Neurological:      Mental Status: She is alert and oriented to person, place, and time.   Psychiatric:         Mood and Affect: Mood and affect normal.         Behavior: Behavior normal.         Thought Content: Thought content normal.         Judgment: Judgment normal.             Result Review :       Common labs    Common Labs 3/16/22 4/29/22 7/21/22   Glucose  91    BUN  17    Creatinine 1.10 1.00 1.20   Sodium  134 (A)    Potassium  4.5    Chloride  102    Calcium  8.6    Albumin  3.60    Total Bilirubin  0.4    Alkaline Phosphatase  73    AST (SGOT)  34 (A)    ALT (SGPT)  26    (A) Abnormal value       Comments are available for some flowsheets but are not being displayed.                            Assessment and Plan        Diagnoses and all orders for this visit:    1. Primary insomnia (Primary)  -     zolpidem CR (AMBIEN CR) 6.25 MG CR tablet; Take 1 tablet by mouth At Night As Needed for Sleep.  Dispense: 90 tablet; Refill: 0    2. Need for pneumococcal vaccination  -     Pneumococcal Conjugate Vaccine 20-Valent (PCV20)    3. Postmenopausal  -     Calcium Carb-Cholecalciferol (Os-Nick Calcium + D3) 500-5 MG-MCG tablet per tablet; Take 1 tablet by mouth 2 (Two) Times a Day.  Dispense: 180 tablet;  Refill: 1    4. Rheumatoid arthritis involving multiple sites with positive rheumatoid factor (HCC)  -     celecoxib (CeleBREX) 200 MG capsule; Take 1 capsule by mouth Daily.  Dispense: 90 capsule; Refill: 1    5. Gastroesophageal reflux disease without esophagitis  -     famotidine (PEPCID) 40 MG tablet; Take 1 tablet by mouth Daily.  Dispense: 90 tablet; Refill: 2  -     pantoprazole (PROTONIX) 40 MG EC tablet; Take 1 tablet by mouth Daily.  Dispense: 90 tablet; Refill: 1    6. Cancer (HCC)  -     folic acid (FOLVITE) 1 MG tablet; Take 1 tablet by mouth Daily.  Dispense: 90 tablet; Refill: 3    7. Essential hypertension  -     lisinopril (PRINIVIL,ZESTRIL) 10 MG tablet; Take 1 tablet by mouth Daily.  Dispense: 90 tablet; Refill: 3    8. Menopausal disorder  -     Premarin 0.625 MG/GM vaginal cream; Insert 2 g into the vagina As Needed (Vaginal irritation).  Dispense: 30 g; Refill: 2    9. Long-term use of high-risk medication  -     POC Urine Drug Screen [facility]          Follow Up     Return in about 3 months (around 2/23/2023) for Next scheduled follow up.     Obtained a written consent for LEESA query. Discussed the risks and benefits of the use of controlled substances with the patient, including the risk of tolerance and drug dependence.  The patient has been counseled on the need to have an exit strategy, including potentially discontinuing the use of controlled substances.  LEESA has or will be reviewed as soon as it becomes available.    Patient was given instructions and counseling regarding her condition or for health maintenance advice. Please see specific information pulled into the AVS if appropriate.     GUNJAN Malloy

## 2022-11-23 ENCOUNTER — OFFICE VISIT (OUTPATIENT)
Dept: FAMILY MEDICINE CLINIC | Facility: CLINIC | Age: 67
End: 2022-11-23

## 2022-11-23 VITALS
DIASTOLIC BLOOD PRESSURE: 67 MMHG | SYSTOLIC BLOOD PRESSURE: 100 MMHG | OXYGEN SATURATION: 98 % | BODY MASS INDEX: 37.16 KG/M2 | TEMPERATURE: 97.9 F | HEART RATE: 88 BPM | WEIGHT: 209.8 LBS

## 2022-11-23 DIAGNOSIS — N95.9 MENOPAUSAL DISORDER: ICD-10-CM

## 2022-11-23 DIAGNOSIS — M05.79 RHEUMATOID ARTHRITIS INVOLVING MULTIPLE SITES WITH POSITIVE RHEUMATOID FACTOR: ICD-10-CM

## 2022-11-23 DIAGNOSIS — Z78.0 POSTMENOPAUSAL: ICD-10-CM

## 2022-11-23 DIAGNOSIS — Z79.899 LONG-TERM USE OF HIGH-RISK MEDICATION: ICD-10-CM

## 2022-11-23 DIAGNOSIS — F51.01 PRIMARY INSOMNIA: Primary | ICD-10-CM

## 2022-11-23 DIAGNOSIS — K21.9 GASTROESOPHAGEAL REFLUX DISEASE WITHOUT ESOPHAGITIS: ICD-10-CM

## 2022-11-23 DIAGNOSIS — I10 ESSENTIAL HYPERTENSION: ICD-10-CM

## 2022-11-23 DIAGNOSIS — Z23 NEED FOR PNEUMOCOCCAL VACCINATION: ICD-10-CM

## 2022-11-23 DIAGNOSIS — C80.1 CANCER: ICD-10-CM

## 2022-11-23 LAB
AMPHET+METHAMPHET UR QL: NEGATIVE
AMPHETAMINE INTERNAL CONTROL: ABNORMAL
AMPHETAMINES UR QL: NEGATIVE
BARBITURATE INTERNAL CONTROL: ABNORMAL
BARBITURATES UR QL SCN: NEGATIVE
BENZODIAZ UR QL SCN: POSITIVE
BENZODIAZEPINE INTERNAL CONTROL: ABNORMAL
BUPRENORPHINE INTERNAL CONTROL: ABNORMAL
BUPRENORPHINE SERPL-MCNC: NEGATIVE NG/ML
CANNABINOIDS SERPL QL: NEGATIVE
COCAINE INTERNAL CONTROL: ABNORMAL
COCAINE UR QL: NEGATIVE
EXPIRATION DATE: ABNORMAL
Lab: ABNORMAL
MDMA (ECSTASY) INTERNAL CONTROL: ABNORMAL
MDMA UR QL SCN: NEGATIVE
METHADONE INTERNAL CONTROL: ABNORMAL
METHADONE UR QL SCN: NEGATIVE
METHAMPHETAMINE INTERNAL CONTROL: ABNORMAL
OPIATES INTERNAL CONTROL: ABNORMAL
OPIATES UR QL: NEGATIVE
OXYCODONE INTERNAL CONTROL: ABNORMAL
OXYCODONE UR QL SCN: NEGATIVE
PCP UR QL SCN: NEGATIVE
PHENCYCLIDINE INTERNAL CONTROL: ABNORMAL
THC INTERNAL CONTROL: ABNORMAL

## 2022-11-23 PROCEDURE — 80305 DRUG TEST PRSMV DIR OPT OBS: CPT | Performed by: NURSE PRACTITIONER

## 2022-11-23 PROCEDURE — 99214 OFFICE O/P EST MOD 30 MIN: CPT | Performed by: NURSE PRACTITIONER

## 2022-11-23 PROCEDURE — G0009 ADMIN PNEUMOCOCCAL VACCINE: HCPCS | Performed by: NURSE PRACTITIONER

## 2022-11-23 PROCEDURE — 90677 PCV20 VACCINE IM: CPT | Performed by: NURSE PRACTITIONER

## 2022-11-23 RX ORDER — DEXTROMETHORPHAN HBR, GUAIFENESIN 20; 200 MG/10ML; MG/10ML
SOLUTION ORAL
COMMUNITY
Start: 2022-11-18

## 2022-11-23 RX ORDER — CALCIUM CARBONATE/VITAMIN D3 500MG-5MCG
1 TABLET ORAL 2 TIMES DAILY
Qty: 180 TABLET | Refills: 1 | Status: SHIPPED | OUTPATIENT
Start: 2022-11-23

## 2022-11-23 RX ORDER — CONJUGATED ESTROGENS 0.62 MG/G
2 CREAM VAGINAL AS NEEDED
Qty: 30 G | Refills: 2 | Status: SHIPPED | OUTPATIENT
Start: 2022-11-23

## 2022-11-23 RX ORDER — AMOXICILLIN 500 MG/1
CAPSULE ORAL
COMMUNITY
Start: 2022-11-18 | End: 2023-02-23

## 2022-11-23 RX ORDER — PANTOPRAZOLE SODIUM 40 MG/1
40 TABLET, DELAYED RELEASE ORAL DAILY
Qty: 90 TABLET | Refills: 1 | Status: SHIPPED | OUTPATIENT
Start: 2022-11-23

## 2022-11-23 RX ORDER — LISINOPRIL 10 MG/1
10 TABLET ORAL DAILY
Qty: 90 TABLET | Refills: 3 | Status: SHIPPED | OUTPATIENT
Start: 2022-11-23 | End: 2023-02-23

## 2022-11-23 RX ORDER — FOLIC ACID 1 MG/1
1 TABLET ORAL DAILY
Qty: 90 TABLET | Refills: 3 | Status: SHIPPED | OUTPATIENT
Start: 2022-11-23

## 2022-11-23 RX ORDER — ZOLPIDEM TARTRATE 6.25 MG/1
6.25 TABLET, FILM COATED, EXTENDED RELEASE ORAL NIGHTLY PRN
Qty: 90 TABLET | Refills: 0 | Status: SHIPPED | OUTPATIENT
Start: 2022-11-23 | End: 2023-02-23 | Stop reason: SDUPTHER

## 2022-11-23 RX ORDER — FAMOTIDINE 40 MG/1
40 TABLET, FILM COATED ORAL DAILY
Qty: 90 TABLET | Refills: 2 | Status: SHIPPED | OUTPATIENT
Start: 2022-11-23

## 2022-11-23 RX ORDER — FAMOTIDINE 20 MG/1
TABLET, FILM COATED ORAL
COMMUNITY
Start: 2022-11-18 | End: 2022-11-23 | Stop reason: SDUPTHER

## 2022-11-23 RX ORDER — CELECOXIB 200 MG/1
200 CAPSULE ORAL DAILY
Qty: 90 CAPSULE | Refills: 1 | Status: SHIPPED | OUTPATIENT
Start: 2022-11-23

## 2022-11-29 ENCOUNTER — OFFICE VISIT (OUTPATIENT)
Dept: ORTHOPEDIC SURGERY | Facility: CLINIC | Age: 67
End: 2022-11-29

## 2022-11-29 VITALS — BODY MASS INDEX: 37.74 KG/M2 | HEART RATE: 97 BPM | HEIGHT: 63 IN | OXYGEN SATURATION: 97 % | WEIGHT: 213 LBS

## 2022-11-29 DIAGNOSIS — Z47.89 AFTERCARE FOLLOWING SURGERY OF THE MUSCULOSKELETAL SYSTEM: Primary | ICD-10-CM

## 2022-11-29 DIAGNOSIS — Z96.652 S/P TKR (TOTAL KNEE REPLACEMENT), LEFT: ICD-10-CM

## 2022-11-29 PROCEDURE — 99212 OFFICE O/P EST SF 10 MIN: CPT | Performed by: ORTHOPAEDIC SURGERY

## 2022-11-29 NOTE — PROGRESS NOTES
"Chief Complaint  Follow-up of the Left Knee     Subjective      Jeimy Willard presents to [facility] for follow up evaluation of the left knee. The patient is S/P left total knee arthroplasty, 11/4/2020. She is overall doing well. She denies much pain. She has no complaints.     Allergies   Allergen Reactions   • Heparin Other (See Comments)     Fever with chills        Social History     Socioeconomic History   • Marital status:    Tobacco Use   • Smoking status: Never     Passive exposure: Never   • Smokeless tobacco: Never   Vaping Use   • Vaping Use: Never used   Substance and Sexual Activity   • Alcohol use: Never   • Drug use: Never   • Sexual activity: Not Currently     Partners: Male     Birth control/protection: Post-menopausal        Review of Systems     Objective   Vital Signs:   Pulse 97   Ht 160 cm (63\")   Wt 96.6 kg (213 lb)   SpO2 97%   BMI 37.73 kg/m²       Physical Exam  Constitutional:       Appearance: Normal appearance. The patient is well-developed and normal weight.   HENT:      Head: Normocephalic.      Right Ear: Hearing and external ear normal.      Left Ear: Hearing and external ear normal.      Nose: Nose normal.   Eyes:      Conjunctiva/sclera: Conjunctivae normal.   Cardiovascular:      Rate and Rhythm: Normal rate.   Pulmonary:      Effort: Pulmonary effort is normal.      Breath sounds: No wheezing or rales.   Abdominal:      Palpations: Abdomen is soft.      Tenderness: There is no abdominal tenderness.   Musculoskeletal:      Cervical back: Normal range of motion.   Skin:     Findings: No rash.   Neurological:      Mental Status: The patient is alert and oriented to person, place, and time.   Psychiatric:         Mood and Affect: Mood and affect normal.         Judgment: Judgment normal.       Ortho Exam      Left knee- ROM 0-130 degrees. Stable to varus/valgus stress. Stable to anterior/posterior drawer. Well healed scar. Neurovascularly " intact. Positive EHL, FHL, GS and TA. Sensation intact to all 5 nerves of the foot. Positive pulses. Calf soft. Patella well tracking.     Procedures    X-Ray Report:  Left knee(s) X-Ray  Indication: Evaluation of left knee pain  AP/Lateral and Mars view(s)  Findings: intact left knee replacement. No signs of hardware failure.   Prior studies available for comparison: yes         Imaging Results (Most Recent)     Procedure Component Value Units Date/Time    XR Knee 3 View Left [[id number]] Resulted: 11/29/22 0859     Updated: 11/29/22 0903           Result Review :       No results found.           Assessment and Plan     Diagnoses and all orders for this visit:    1. Aftercare following surgery of the musculoskeletal system (Primary)  -     XR Knee 3 View Left    2. S/P TKR (total knee replacement), left        Discussed the treatment plan with the patient.  I reviewed the x-rays that were obtained today with the patient. Plan to proceed with activity as tolerated.     Call or return if worsening symptoms.    Follow Up     2 year      Patient was given instructions and counseling regarding her condition or for health maintenance advice. Please see specific information pulled into the AVS if appropriate.     Scribed for Chuck Valdez MD by Charmaine Mg.  11/29/22   09:28 EST    I have personally performed the services described in this document as scribed by the above individual and it is both accurate and complete. Chuck Valdez MD 11/30/22

## 2023-02-10 ENCOUNTER — OFFICE VISIT (OUTPATIENT)
Dept: FAMILY MEDICINE CLINIC | Facility: CLINIC | Age: 68
End: 2023-02-10
Payer: MEDICARE

## 2023-02-10 VITALS
SYSTOLIC BLOOD PRESSURE: 133 MMHG | HEART RATE: 104 BPM | TEMPERATURE: 98.3 F | WEIGHT: 204.6 LBS | DIASTOLIC BLOOD PRESSURE: 60 MMHG | HEIGHT: 63 IN | BODY MASS INDEX: 36.25 KG/M2 | OXYGEN SATURATION: 94 %

## 2023-02-10 DIAGNOSIS — R06.2 WHEEZE: ICD-10-CM

## 2023-02-10 DIAGNOSIS — J40 BRONCHITIS: Primary | ICD-10-CM

## 2023-02-10 DIAGNOSIS — R05.1 ACUTE COUGH: ICD-10-CM

## 2023-02-10 LAB
EXPIRATION DATE: NORMAL
FLUAV AG UPPER RESP QL IA.RAPID: NOT DETECTED
FLUBV AG UPPER RESP QL IA.RAPID: NOT DETECTED
INTERNAL CONTROL: NORMAL
Lab: NORMAL
SARS-COV-2 AG UPPER RESP QL IA.RAPID: NOT DETECTED

## 2023-02-10 PROCEDURE — 96372 THER/PROPH/DIAG INJ SC/IM: CPT | Performed by: NURSE PRACTITIONER

## 2023-02-10 PROCEDURE — 99214 OFFICE O/P EST MOD 30 MIN: CPT | Performed by: NURSE PRACTITIONER

## 2023-02-10 PROCEDURE — 87428 SARSCOV & INF VIR A&B AG IA: CPT | Performed by: NURSE PRACTITIONER

## 2023-02-10 RX ORDER — CETIRIZINE HYDROCHLORIDE 10 MG/1
TABLET ORAL
COMMUNITY
Start: 2022-12-22

## 2023-02-10 RX ORDER — METHYLPREDNISOLONE SODIUM SUCCINATE 125 MG/2ML
125 INJECTION, POWDER, LYOPHILIZED, FOR SOLUTION INTRAMUSCULAR; INTRAVENOUS ONCE
Status: COMPLETED | OUTPATIENT
Start: 2023-02-10 | End: 2023-02-10

## 2023-02-10 RX ORDER — ALBUTEROL SULFATE 1.25 MG/3ML
1.25 SOLUTION RESPIRATORY (INHALATION) EVERY 6 HOURS PRN
Status: SHIPPED | OUTPATIENT
Start: 2023-02-10

## 2023-02-10 RX ORDER — AMOXICILLIN AND CLAVULANATE POTASSIUM 875; 125 MG/1; MG/1
1 TABLET, FILM COATED ORAL 2 TIMES DAILY
Qty: 20 TABLET | Refills: 0 | Status: SHIPPED | OUTPATIENT
Start: 2023-02-10 | End: 2023-02-23

## 2023-02-10 RX ORDER — METHYLPREDNISOLONE SODIUM SUCCINATE 125 MG/2ML
125 INJECTION, POWDER, LYOPHILIZED, FOR SOLUTION INTRAMUSCULAR; INTRAVENOUS EVERY 6 HOURS
Status: DISCONTINUED | OUTPATIENT
Start: 2023-02-10 | End: 2023-02-10

## 2023-02-10 RX ORDER — BROMPHENIRAMINE MALEATE, PSEUDOEPHEDRINE HYDROCHLORIDE, AND DEXTROMETHORPHAN HYDROBROMIDE 2; 30; 10 MG/5ML; MG/5ML; MG/5ML
5 SYRUP ORAL 4 TIMES DAILY PRN
Qty: 473 ML | Refills: 0 | Status: SHIPPED | OUTPATIENT
Start: 2023-02-10

## 2023-02-10 RX ORDER — ALBUTEROL SULFATE 2.5 MG/3ML
2.5 SOLUTION RESPIRATORY (INHALATION) EVERY 4 HOURS PRN
Qty: 90 ML | Refills: 1 | Status: SHIPPED | OUTPATIENT
Start: 2023-02-10

## 2023-02-10 RX ADMIN — METHYLPREDNISOLONE SODIUM SUCCINATE 125 MG: 125 INJECTION, POWDER, LYOPHILIZED, FOR SOLUTION INTRAMUSCULAR; INTRAVENOUS at 08:27

## 2023-02-10 NOTE — PROGRESS NOTES
Chief Complaint  Cough, Shortness of Breath, and Nasal Congestion    Subjective          Jeimy Willadr is a 67 y.o. female who presents to [facility]    History of Present Illness  Complains of cough, nasal congestion, and shortness of breath for a couple of weeks but its gotten worse this week.    PHQ-2 Total Score:     PHQ-9 Total Score:          Review of Systems       Medical History: has a past medical history of Anemia, Arthritis, Cancer (HCC), Cholelithiasis (2000), Colon polyp, GERD (gastroesophageal reflux disease), History of transfusion, Hypertension, Insomnia, Lymphoma (HCC) (1999), Osteoporosis, Seasonal allergies, Stomach cancer (HCC) (2005), Stomach disorder, Ulcer (traumatic) of oral mucosa, and Vitamin D deficiency.     Surgical History: has a past surgical history that includes Cholecystectomy; Colonoscopy (02/20/2019); Esophagogastroduodenoscopy (03/13/2020); Gallbladder surgery; Laparoscopic total gastrectomy; and Replacement total knee (Left, 2020).     Family History: family history includes Alzheimer's disease in her maternal grandfather; Arthritis in her mother; Breast cancer in her mother; Cancer in her brother and mother; Diabetes in her brother, father, and mother; Esophageal cancer in her paternal grandmother; Heart disease in her brother and mother; Heart failure in her mother; Hypertension in her brother and mother; Kidney disease in her brother and mother; Kidney failure in her mother; Lung cancer in her maternal grandmother; Miscarriages / Stillbirths in her mother and sister; Stroke in her mother.     Social History: reports that she has never smoked. She has never been exposed to tobacco smoke. She has never used smokeless tobacco. She reports that she does not drink alcohol and does not use drugs.    Allergies: Heparin      Health Maintenance Due   Topic Date Due   • TDAP/TD VACCINES (1 - Tdap) Never done   • HEPATITIS C SCREENING  Never done   •  COVID-19 Vaccine (5 - Booster for Pfizer series) 06/11/2022   • INFLUENZA VACCINE  08/01/2022            Current Outpatient Medications:   •  acetaminophen (TYLENOL) 325 MG tablet, Take 650 mg by mouth Every 6 (Six) Hours As Needed., Disp: , Rfl:   •  azelastine (ASTELIN) 0.1 % nasal spray, , Disp: , Rfl:   •  Calcium Carb-Cholecalciferol (Os-Nick Calcium + D3) 500-5 MG-MCG tablet per tablet, Take 1 tablet by mouth 2 (Two) Times a Day., Disp: 180 tablet, Rfl: 1  •  celecoxib (CeleBREX) 200 MG capsule, Take 1 capsule by mouth Daily., Disp: 90 capsule, Rfl: 1  •  cetirizine (zyrTEC) 10 MG tablet, , Disp: , Rfl:   •  Cough/Chest Congestion DM  MG/5ML syrup, , Disp: , Rfl:   •  cyanocobalamin 1000 MCG/ML injection, , Disp: , Rfl:   •  Diclofenac Sodium (VOLTAREN) 1 % gel gel, Apply 2 g topically to the appropriate area as directed 4 (Four) Times a Day As Needed (knee pain)., Disp: 100 g, Rfl: 1  •  diphenhydrAMINE (BENADRYL) 25 mg capsule, Take 1 capsule by mouth Every 6 (Six) Hours As Needed., Disp: , Rfl:   •  EPINEPHrine (EPIPEN) 0.3 MG/0.3ML solution auto-injector injection, , Disp: , Rfl:   •  famotidine (PEPCID) 40 MG tablet, Take 1 tablet by mouth Daily., Disp: 90 tablet, Rfl: 2  •  fluticasone (FLONASE) 50 MCG/ACT nasal spray, , Disp: , Rfl:   •  folic acid (FOLVITE) 1 MG tablet, Take 1 tablet by mouth Daily., Disp: 90 tablet, Rfl: 3  •  Iron-Vitamin C (Vitron-C)  MG tablet, Vitron-C 65 mg iron- 125 mg oral tablet,delayed release (DR/EC) take 1 tablet by oral route daily   Active, Disp: , Rfl:   •  lisinopril (PRINIVIL,ZESTRIL) 10 MG tablet, Take 1 tablet by mouth Daily., Disp: 90 tablet, Rfl: 3  •  Multiple Vitamins-Minerals (PRESERVISION AREDS 2 PO), PreserVision AREDS 7,160-113-100 unit-mg-unit oral tablet take 1 tablet by oral route 2 times a day   Active, Disp: , Rfl:   •  olopatadine (PATANOL) 0.1 % ophthalmic solution, 1 drop., Disp: , Rfl:   •  pantoprazole (PROTONIX) 40 MG EC tablet, Take 1  "tablet by mouth Daily., Disp: 90 tablet, Rfl: 1  •  Premarin 0.625 MG/GM vaginal cream, Insert 2 g into the vagina As Needed (Vaginal irritation)., Disp: 30 g, Rfl: 2  •  Pyridoxine HCl (VITAMIN B-6 PO), , Disp: , Rfl:   •  zolpidem CR (AMBIEN CR) 6.25 MG CR tablet, Take 1 tablet by mouth At Night As Needed for Sleep., Disp: 90 tablet, Rfl: 0  •  albuterol (PROVENTIL) (2.5 MG/3ML) 0.083% nebulizer solution, Take 2.5 mg by nebulization Every 4 (Four) Hours As Needed for Wheezing., Disp: 90 mL, Rfl: 1  •  amoxicillin (AMOXIL) 500 MG capsule, , Disp: , Rfl:   •  amoxicillin-clavulanate (Augmentin) 875-125 MG per tablet, Take 1 tablet by mouth 2 (Two) Times a Day., Disp: 20 tablet, Rfl: 0  •  brompheniramine-pseudoephedrine-DM 30-2-10 MG/5ML syrup, Take 5 mL by mouth 4 (Four) Times a Day As Needed for Allergies., Disp: 473 mL, Rfl: 0  •  loratadine (CLARITIN) 10 MG tablet, Take 1 tablet by mouth Daily., Disp: 90 tablet, Rfl: 3    Current Facility-Administered Medications:   •  albuterol (PROVENTIL) nebulizer solution 0.042% 1.25 mg/3mL, 1.25 mg, Nebulization, Q6H PRN, Carina, Marzena Solomon, GUNJAN      Immunization History   Administered Date(s) Administered   • COVID-19 (PFIZER) PURPLE CAP 03/10/2021, 03/31/2021, 10/22/2021   • Covid-19 (Pfizer) Gray Cap 04/16/2022   • Flu Vaccine Quad PF >36MO 09/26/2020   • FluLaval/Fluzone >6mos 09/17/2021   • FluMist 2-49yrs 09/26/2020   • Fluzone Quad >6mos (Multi-dose) 09/26/2020   • Pneumococcal Conjugate 20-Valent (PCV20) 11/23/2022   • Shingrix 12/19/2021, 03/11/2022         Objective       Vitals:    02/10/23 0742   BP: 133/60   BP Location: Left arm   Patient Position: Sitting   Cuff Size: Adult   Pulse: 104   Temp: 98.3 °F (36.8 °C)   TempSrc: Temporal   SpO2: 94%   Weight: 92.8 kg (204 lb 9.6 oz)   Height: 160 cm (63\")   PainSc: 0-No pain      Body mass index is 36.24 kg/m².   Wt Readings from Last 3 Encounters:   02/10/23 92.8 kg (204 lb 9.6 oz)   11/29/22 96.6 kg (213 lb) "   11/23/22 95.2 kg (209 lb 12.8 oz)      BP Readings from Last 3 Encounters:   02/10/23 133/60   11/23/22 100/67   08/29/22 132/81        Physical Exam  Vitals reviewed.   Constitutional:       Appearance: Normal appearance.   HENT:      Head: Normocephalic and atraumatic.   Eyes:      Conjunctiva/sclera: Conjunctivae normal.      Pupils: Pupils are equal, round, and reactive to light.   Cardiovascular:      Rate and Rhythm: Normal rate and regular rhythm.      Pulses: Normal pulses.      Heart sounds: Normal heart sounds.   Pulmonary:      Effort: Pulmonary effort is normal.      Breath sounds: Examination of the right-middle field reveals rhonchi. Examination of the left-middle field reveals rhonchi. Examination of the right-lower field reveals rhonchi. Examination of the left-lower field reveals rhonchi. Wheezing and rhonchi present.      Comments: Wheezes clear after nebulizer, but rhonchi continues  Skin:     General: Skin is warm and dry.   Neurological:      Mental Status: She is alert and oriented to person, place, and time.   Psychiatric:         Mood and Affect: Mood normal.         Behavior: Behavior normal.         Thought Content: Thought content normal.         Judgment: Judgment normal.             Result Review :       Common labs    Common Labs 3/16/22 4/29/22 7/21/22   Glucose  91    BUN  17    Creatinine 1.10 1.00 1.20   Sodium  134 (A)    Potassium  4.5    Chloride  102    Calcium  8.6    Albumin  3.60    Total Bilirubin  0.4    Alkaline Phosphatase  73    AST (SGOT)  34 (A)    ALT (SGPT)  26    (A) Abnormal value       Comments are available for some flowsheets but are not being displayed.           Lab Results   Component Value Date    SARSANTIGEN Not Detected 02/10/2023    COVID19 NOT DETECTED 10/30/2020    FLUAAG Not Detected 02/10/2023    INR 0.99 (L) 10/26/2020    BILIRUBINUR Negative 04/29/2022                      Assessment and Plan        Diagnoses and all orders for this visit:    1.  Bronchitis (Primary)  -     amoxicillin-clavulanate (Augmentin) 875-125 MG per tablet; Take 1 tablet by mouth 2 (Two) Times a Day.  Dispense: 20 tablet; Refill: 0    2. Wheeze  -     POCT SARS-CoV-2 Antigen JOSE + Flu  -     Discontinue: methylPREDNISolone sodium succinate (SOLU-Medrol) injection 125 mg  -     albuterol (PROVENTIL) (2.5 MG/3ML) 0.083% nebulizer solution; Take 2.5 mg by nebulization Every 4 (Four) Hours As Needed for Wheezing.  Dispense: 90 mL; Refill: 1  -     brompheniramine-pseudoephedrine-DM 30-2-10 MG/5ML syrup; Take 5 mL by mouth 4 (Four) Times a Day As Needed for Allergies.  Dispense: 473 mL; Refill: 0  -     methylPREDNISolone sodium succinate (SOLU-Medrol) injection 125 mg  -     albuterol (PROVENTIL) nebulizer solution 0.042% 1.25 mg/3mL    3. Acute cough  -     Discontinue: methylPREDNISolone sodium succinate (SOLU-Medrol) injection 125 mg  -     albuterol (PROVENTIL) (2.5 MG/3ML) 0.083% nebulizer solution; Take 2.5 mg by nebulization Every 4 (Four) Hours As Needed for Wheezing.  Dispense: 90 mL; Refill: 1  -     brompheniramine-pseudoephedrine-DM 30-2-10 MG/5ML syrup; Take 5 mL by mouth 4 (Four) Times a Day As Needed for Allergies.  Dispense: 473 mL; Refill: 0  -     methylPREDNISolone sodium succinate (SOLU-Medrol) injection 125 mg          Follow Up     Return if symptoms worsen or fail to improve, for Next scheduled follow up.    Patient was given instructions and counseling regarding her condition or for health maintenance advice. Please see specific information pulled into the AVS if appropriate.     Marzena Lim APRN

## 2023-02-23 ENCOUNTER — OFFICE VISIT (OUTPATIENT)
Dept: FAMILY MEDICINE CLINIC | Facility: CLINIC | Age: 68
End: 2023-02-23
Payer: MEDICARE

## 2023-02-23 VITALS
BODY MASS INDEX: 36.6 KG/M2 | HEART RATE: 87 BPM | DIASTOLIC BLOOD PRESSURE: 87 MMHG | SYSTOLIC BLOOD PRESSURE: 113 MMHG | WEIGHT: 206.6 LBS | TEMPERATURE: 97.5 F | OXYGEN SATURATION: 98 %

## 2023-02-23 DIAGNOSIS — F51.01 PRIMARY INSOMNIA: ICD-10-CM

## 2023-02-23 PROCEDURE — 99213 OFFICE O/P EST LOW 20 MIN: CPT | Performed by: NURSE PRACTITIONER

## 2023-02-23 RX ORDER — ZOLPIDEM TARTRATE 6.25 MG/1
6.25 TABLET, FILM COATED, EXTENDED RELEASE ORAL NIGHTLY PRN
Qty: 90 TABLET | Refills: 0 | Status: SHIPPED | OUTPATIENT
Start: 2023-02-23

## 2023-02-23 NOTE — PATIENT INSTRUCTIONS
Insomnia  Insomnia is a sleep disorder that makes it difficult to fall asleep or stay asleep. Insomnia can cause fatigue, low energy, difficulty concentrating, mood swings, and poor performance at work or school.  There are three different ways to classify insomnia:  Difficulty falling asleep.  Difficulty staying asleep.  Waking up too early in the morning.  Any type of insomnia can be long-term (chronic) or short-term (acute). Both are common. Short-term insomnia usually lasts for three months or less. Chronic insomnia occurs at least three times a week for longer than three months.  What are the causes?  Insomnia may be caused by another condition, situation, or substance, such as:  Anxiety.  Certain medicines.  Gastroesophageal reflux disease (GERD) or other gastrointestinal conditions.  Asthma or other breathing conditions.  Restless legs syndrome, sleep apnea, or other sleep disorders.  Chronic pain.  Menopause.  Stroke.  Abuse of alcohol, tobacco, or illegal drugs.  Mental health conditions, such as depression.  Caffeine.  Neurological disorders, such as Alzheimer's disease.  An overactive thyroid (hyperthyroidism).  Sometimes, the cause of insomnia may not be known.  What increases the risk?  Risk factors for insomnia include:  Gender. Women are affected more often than men.  Age. Insomnia is more common as you get older.  Stress.  Lack of exercise.  Irregular work schedule or working night shifts.  Traveling between different time zones.  Certain medical and mental health conditions.  What are the signs or symptoms?  If you have insomnia, the main symptom is having trouble falling asleep or having trouble staying asleep. This may lead to other symptoms, such as:  Feeling fatigued or having low energy.  Feeling nervous about going to sleep.  Not feeling rested in the morning.  Having trouble concentrating.  Feeling irritable, anxious, or depressed.  How is this diagnosed?  This condition may be diagnosed  based on:  Your symptoms and medical history. Your health care provider may ask about:  Your sleep habits.  Any medical conditions you have.  Your mental health.  A physical exam.  How is this treated?  Treatment for insomnia depends on the cause. Treatment may focus on treating an underlying condition that is causing insomnia. Treatment may also include:  Medicines to help you sleep.  Counseling or therapy.  Lifestyle adjustments to help you sleep better.  Follow these instructions at home:  Eating and drinking    Limit or avoid alcohol, caffeinated beverages, and cigarettes, especially close to bedtime. These can disrupt your sleep.  Do not eat a large meal or eat spicy foods right before bedtime. This can lead to digestive discomfort that can make it hard for you to sleep.  Sleep habits    Keep a sleep diary to help you and your health care provider figure out what could be causing your insomnia. Write down:  When you sleep.  When you wake up during the night.  How well you sleep.  How rested you feel the next day.  Any side effects of medicines you are taking.  What you eat and drink.  Make your bedroom a dark, comfortable place where it is easy to fall asleep.  Put up shades or blackout curtains to block light from outside.  Use a white noise machine to block noise.  Keep the temperature cool.  Limit screen use before bedtime. This includes:  Watching TV.  Using your smartphone, tablet, or computer.  Stick to a routine that includes going to bed and waking up at the same times every day and night. This can help you fall asleep faster. Consider making a quiet activity, such as reading, part of your nighttime routine.  Try to avoid taking naps during the day so that you sleep better at night.  Get out of bed if you are still awake after 15 minutes of trying to sleep. Keep the lights down, but try reading or doing a quiet activity. When you feel sleepy, go back to bed.  General instructions  Take over-the-counter  and prescription medicines only as told by your health care provider.  Exercise regularly, as told by your health care provider. Avoid exercise starting several hours before bedtime.  Use relaxation techniques to manage stress. Ask your health care provider to suggest some techniques that may work well for you. These may include:  Breathing exercises.  Routines to release muscle tension.  Visualizing peaceful scenes.  Make sure that you drive carefully. Avoid driving if you feel very sleepy.  Keep all follow-up visits as told by your health care provider. This is important.  Contact a health care provider if:  You are tired throughout the day.  You have trouble in your daily routine due to sleepiness.  You continue to have sleep problems, or your sleep problems get worse.  Get help right away if:  You have serious thoughts about hurting yourself or someone else.  If you ever feel like you may hurt yourself or others, or have thoughts about taking your own life, get help right away. You can go to your nearest emergency department or call:  Your local emergency services (911 in the [place]  A suicide crisis helpline, such as the National Suicide Prevention Lifeline at 1-171.456.5387 or 768 in the [place] This is open 24 hours a day.  Summary  Insomnia is a sleep disorder that makes it difficult to fall asleep or stay asleep.  Insomnia can be long-term (chronic) or short-term (acute).  Treatment for insomnia depends on the cause. Treatment may focus on treating an underlying condition that is causing insomnia.  Keep a sleep diary to help you and your health care provider figure out what could be causing your insomnia.  This information is not intended to replace advice given to you by your health care provider. Make sure you discuss any questions you have with your health care provider.  Document Revised: 07/13/2022 Document Reviewed: 10/28/2021  [employer] Patient Education © 2022 [employer]

## 2023-03-14 NOTE — PROGRESS NOTES
The ABCs of the Annual Wellness Visit  Subsequent Medicare Wellness Visit    Chief Complaint   Patient presents with   • Medicare Wellness-subsequent   • Med Refill      Subjective    History of Present Illness:  Jeimy Willard is a 68 y.o. female who presents for a Subsequent Medicare Wellness Visit.    The following portions of the patient's history were reviewed and   updated as appropriate: allergies, current medications, past family history, past medical history, past social history, past surgical history and problem list.    Compared to one year ago, the patient feels her physical   health is better.    Compared to one year ago, the patient feels her mental   health is the same.    Allergies- Managed by asthma and allergy. On allergy shots.     Gerd- Better at night with addition of Pepcid.     HTN- Well managed. On home log BP from 111/60s to 130s/70s.      Insomnia- 6-7 hours per night. Taking medication as prescribed. Denies side effects with Ambien.       Recent Hospitalizations:  She was not admitted to the hospital during the last year.       Current Medical Providers:  Patient Care Team:  Jeaneth Barker APRN as PCP - General (Nurse Practitioner)    Outpatient Medications Prior to Visit   Medication Sig Dispense Refill   • acetaminophen (TYLENOL) 325 MG tablet Take 2 tablets by mouth Every 6 (Six) Hours As Needed.     • albuterol (PROVENTIL) (2.5 MG/3ML) 0.083% nebulizer solution Take 2.5 mg by nebulization Every 4 (Four) Hours As Needed for Wheezing. 90 mL 1   • azelastine (ASTELIN) 0.1 % nasal spray      • Calcium Carb-Cholecalciferol (Os-Nick Calcium + D3) 500-5 MG-MCG tablet per tablet Take 1 tablet by mouth 2 (Two) Times a Day. 180 tablet 1   • celecoxib (CeleBREX) 200 MG capsule Take 1 capsule by mouth Daily. 90 capsule 1   • cetirizine (zyrTEC) 10 MG tablet      • cyanocobalamin 1000 MCG/ML injection      • Diclofenac Sodium (VOLTAREN) 1 % gel gel Apply 2 g topically to the appropriate area as  directed 4 (Four) Times a Day As Needed (knee pain). 100 g 1   • diphenhydrAMINE (BENADRYL) 25 mg capsule Take 1 capsule by mouth Every 6 (Six) Hours As Needed.     • EPINEPHrine (EPIPEN) 0.3 MG/0.3ML solution auto-injector injection      • fluticasone (FLONASE) 50 MCG/ACT nasal spray      • folic acid (FOLVITE) 1 MG tablet Take 1 tablet by mouth Daily. 90 tablet 3   • Iron-Vitamin C (Vitron-C)  MG tablet Vitron-C 65 mg iron- 125 mg oral tablet,delayed release (DR/EC) take 1 tablet by oral route daily   Active     • lisinopril (PRINIVIL,ZESTRIL) 10 MG tablet      • Multiple Vitamins-Minerals (OCUVITE ADULT 50+ PO) Take  by mouth.     • olopatadine (PATANOL) 0.1 % ophthalmic solution 1 drop.     • Premarin 0.625 MG/GM vaginal cream Insert 2 g into the vagina As Needed (Vaginal irritation). 30 g 2   • Pyridoxine HCl (VITAMIN B-6 PO)      • famotidine (PEPCID) 40 MG tablet Take 1 tablet by mouth Daily. 90 tablet 2   • pantoprazole (PROTONIX) 40 MG EC tablet Take 1 tablet by mouth Daily. 90 tablet 1   • zolpidem CR (AMBIEN CR) 6.25 MG CR tablet Take 1 tablet by mouth At Night As Needed for Sleep. 90 tablet 0   • brompheniramine-pseudoephedrine-DM 30-2-10 MG/5ML syrup Take 5 mL by mouth 4 (Four) Times a Day As Needed for Allergies. (Patient not taking: Reported on 4/18/2023) 473 mL 0   • Cough/Chest Congestion DM  MG/5ML syrup  (Patient not taking: Reported on 4/18/2023)     • Multiple Vitamins-Minerals (PRESERVISION AREDS 2 PO) PreserVision AREDS 7,160-113-100 unit-mg-unit oral tablet take 1 tablet by oral route 2 times a day   Active (Patient not taking: Reported on 4/18/2023)       Facility-Administered Medications Prior to Visit   Medication Dose Route Frequency Provider Last Rate Last Admin   • albuterol (PROVENTIL) nebulizer solution 0.042% 1.25 mg/3mL  1.25 mg Nebulization Q6H PRN Marzena Lim, APRN           No opioid medication identified on active medication list. I have reviewed chart for  "other potential  high risk medication/s and harmful drug interactions in the elderly.          Aspirin is not on active medication list.  Aspirin use is not indicated based on review of current medical condition/s. Risk of harm outweighs potential benefits.  .    Patient Active Problem List   Diagnosis   • Anemia   • Arthritis   • Cancer   • GERD (gastroesophageal reflux disease)   • Stomach disorder   • Hypertension   • Insomnia   • Osteoporosis   • Seasonal allergic rhinitis   • Ulcerative lesion   • Vitamin D deficiency   • Lymphoma   • Aftercare following surgery of Left Total Knee Arthroplasty, 11/4/2020   • Rheumatoid arthritis involving multiple sites with positive rheumatoid factor   • Menopausal disorder   • S/P TKR (total knee replacement), left   • Lymphadenopathy     Advance Care Planning  Advance Directive is on file.  ACP discussion was held with the patient during this visit. Patient has an advance directive in EMR which is still valid.     Review of Systems   Constitutional: Negative for fever.   HENT: Positive for postnasal drip and rhinorrhea.    Respiratory: Negative for cough, chest tightness and shortness of breath.    Cardiovascular: Negative for chest pain and palpitations.   Gastrointestinal: Negative for abdominal pain.   Neurological: Negative for dizziness.   Psychiatric/Behavioral: Positive for sleep disturbance. Negative for self-injury and suicidal ideas.        Objective    Vitals:    05/15/23 1035   BP: 137/90   BP Location: Left arm   Patient Position: Sitting   Cuff Size: Large Adult   Pulse: 76   Temp: 98.1 °F (36.7 °C)   TempSrc: Temporal   SpO2: 98%   Weight: 92.7 kg (204 lb 6.4 oz)   Height: 160 cm (63\")   PainSc: 0-No pain     Estimated body mass index is 36.21 kg/m² as calculated from the following:    Height as of this encounter: 160 cm (63\").    Weight as of this encounter: 92.7 kg (204 lb 6.4 oz).    Class 2 Severe Obesity (BMI >=35 and <=39.9). Obesity-related health " conditions include the following: hypertension. Obesity is unchanged. BMI is is above average; BMI management plan is completed. We discussed portion control and increasing exercise.      Does the patient have evidence of cognitive impairment? No    Physical Exam  Vitals reviewed.   Constitutional:       Appearance: Normal appearance. She is well-developed.   HENT:      Head: Normocephalic and atraumatic.   Eyes:      Conjunctiva/sclera: Conjunctivae normal.      Pupils: Pupils are equal, round, and reactive to light.   Neck:        Comments: Left neck mass- managed per ENT.   Cardiovascular:      Rate and Rhythm: Normal rate and regular rhythm.      Heart sounds: Normal heart sounds. No murmur heard.  Pulmonary:      Effort: Pulmonary effort is normal.      Breath sounds: Normal breath sounds. No wheezing or rhonchi.   Abdominal:      General: Bowel sounds are normal. There is no distension.      Palpations: Abdomen is soft.      Tenderness: There is no abdominal tenderness.   Skin:     General: Skin is warm and dry.   Neurological:      Mental Status: She is alert and oriented to person, place, and time.   Psychiatric:         Mood and Affect: Mood and affect normal.         Behavior: Behavior normal.         Thought Content: Thought content normal.         Judgment: Judgment normal.                 HEALTH RISK ASSESSMENT    Smoking Status:  Social History     Tobacco Use   Smoking Status Never   • Passive exposure: Never   Smokeless Tobacco Never     Alcohol Consumption:  Social History     Substance and Sexual Activity   Alcohol Use Never     Fall Risk Screen:    STEADI Fall Risk Assessment was completed, and patient is at LOW risk for falls.Assessment completed on:5/15/2023    Depression Screenin/15/2023    10:42 AM   PHQ-2/PHQ-9 Depression Screening   Little Interest or Pleasure in Doing Things 0-->not at all   Feeling Down, Depressed or Hopeless 0-->not at all   PHQ-9: Brief Depression Severity  Measure Score 0       Health Habits and Functional and Cognitive Screenin/15/2023    10:40 AM   Functional & Cognitive Status   Do you have difficulty preparing food and eating? No   Do you have difficulty bathing yourself, getting dressed or grooming yourself? No   Do you have difficulty using the toilet? No   Do you have difficulty moving around from place to place? No   Do you have trouble with steps or getting out of a bed or a chair? No   Current Diet Well Balanced Diet   Dental Exam Up to date   Eye Exam Up to date   Exercise (times per week) 7 times per week   Current Exercises Include Walking   Do you need help using the phone?  No   Are you deaf or do you have serious difficulty hearing?  No   Do you need help with transportation? No   Do you need help shopping? No   Do you need help preparing meals?  No   Do you need help with housework?  No   Do you need help with laundry? No   Do you need help taking your medications? No   Do you need help managing money? No   Do you ever drive or ride in a car without wearing a seat belt? No   Have you felt unusual stress, anger or loneliness in the last month? No   Who do you live with? Spouse   If you need help, do you have trouble finding someone available to you? No   Have you been bothered in the last four weeks by sexual problems? No   Do you have difficulty concentrating, remembering or making decisions? No       Age-appropriate Screening Schedule:  Refer to the list below for future screening recommendations based on patient's age, sex and/or medical conditions. Orders for these recommended tests are listed in the plan section. The patient has been provided with a written plan.    Health Maintenance   Topic Date Due   • TDAP/TD VACCINES (1 - Tdap) Never done   • HEPATITIS C SCREENING  Never done   • COVID-19 Vaccine (5 - Booster for Pfizer series) 2022   • INFLUENZA VACCINE  2023   • DXA SCAN  2023   • MAMMOGRAM  2023   •  COLORECTAL CANCER SCREENING  02/20/2024   • ANNUAL WELLNESS VISIT  05/15/2024   • Pneumococcal Vaccine 65+  Completed   • ZOSTER VACCINE  Completed              Assessment & Plan   CMS Preventative Services Quick Reference  Risk Factors Identified During Encounter  Polypharmacy: Medication List reviewed and Medications are appropriate for patient  The above risks/problems have been discussed with the patient.  Follow up actions/plans if indicated are seen below in the Assessment/Plan Section.  Pertinent information has been shared with the patient in the After Visit Summary.    Diagnoses and all orders for this visit:    1. Medicare annual wellness visit, subsequent (Primary)    2. Essential hypertension  -     CBC (No Diff)    3. Screening for lipid disorders  -     Lipid Panel  -     Comprehensive Metabolic Panel    4. Screening for thyroid disorder  -     TSH    5. Long-term use of high-risk medication  -     POC Urine Drug Screen [facility]    6. Primary insomnia  -     zolpidem CR (AMBIEN CR) 6.25 MG CR tablet; Take 1 tablet by mouth At Night As Needed for Sleep.  Dispense: 90 tablet; Refill: 0    7. Gastroesophageal reflux disease without esophagitis  -     pantoprazole (PROTONIX) 40 MG EC tablet; Take 1 tablet by mouth Daily.  Dispense: 90 tablet; Refill: 1  -     famotidine (PEPCID) 40 MG tablet; Take 1 tablet by mouth Daily.  Dispense: 90 tablet; Refill: 2        Follow Up:   Return in about 3 months (around 8/15/2023) for Next scheduled follow up.     An After Visit Summary and PPPS were made available to the patient.    Obtained a written consent for LEESA query. Discussed the risks and benefits of the use of controlled substances with the patient, including the risk of tolerance and drug dependence.  The patient has been counseled on the need to have an exit strategy, including potentially discontinuing the use of controlled substances.  LEESA has or will be reviewed as soon as it becomes  available.    Updated annual wellness visit checklist.  Immunizations discussed.  Screening discussed and/or ordered.  Recommend yearly dental and eye exams. Also discussed monitoring of blood pressure and lipids.      GUNJAN Malloy

## 2023-03-15 ENCOUNTER — TRANSCRIBE ORDERS (OUTPATIENT)
Dept: ADMINISTRATIVE | Facility: HOSPITAL | Age: 68
End: 2023-03-15
Payer: MEDICARE

## 2023-03-15 DIAGNOSIS — C16.9 MALIGNANT NEOPLASM OF STOMACH, UNSPECIFIED LOCATION: Primary | ICD-10-CM

## 2023-04-14 ENCOUNTER — HOSPITAL ENCOUNTER (OUTPATIENT)
Dept: CT IMAGING | Facility: HOSPITAL | Age: 68
Discharge: HOME | End: 2023-04-14
Admitting: INTERNAL MEDICINE
Payer: MEDICARE

## 2023-04-14 DIAGNOSIS — C16.9 MALIGNANT NEOPLASM OF STOMACH, UNSPECIFIED LOCATION: ICD-10-CM

## 2023-04-14 PROCEDURE — 74176 CT ABD & PELVIS W/O CONTRAST: CPT

## 2023-04-14 PROCEDURE — 70490 CT SOFT TISSUE NECK W/O DYE: CPT

## 2023-04-14 PROCEDURE — 71250 CT THORAX DX C-: CPT

## 2023-04-18 ENCOUNTER — OFFICE VISIT (OUTPATIENT)
Dept: OTOLARYNGOLOGY | Facility: CLINIC | Age: 68
End: 2023-04-18
Payer: MEDICARE

## 2023-04-18 VITALS
HEIGHT: 63 IN | SYSTOLIC BLOOD PRESSURE: 121 MMHG | BODY MASS INDEX: 36.61 KG/M2 | WEIGHT: 206.6 LBS | TEMPERATURE: 97.4 F | DIASTOLIC BLOOD PRESSURE: 79 MMHG | HEART RATE: 77 BPM

## 2023-04-18 DIAGNOSIS — R59.1 LYMPHADENOPATHY: ICD-10-CM

## 2023-04-18 DIAGNOSIS — K11.8 PAROTID MASS: Primary | ICD-10-CM

## 2023-04-18 PROCEDURE — 1160F RVW MEDS BY RX/DR IN RCRD: CPT | Performed by: OTOLARYNGOLOGY

## 2023-04-18 PROCEDURE — 99214 OFFICE O/P EST MOD 30 MIN: CPT | Performed by: OTOLARYNGOLOGY

## 2023-04-18 PROCEDURE — 3078F DIAST BP <80 MM HG: CPT | Performed by: OTOLARYNGOLOGY

## 2023-04-18 PROCEDURE — 1159F MED LIST DOCD IN RCRD: CPT | Performed by: OTOLARYNGOLOGY

## 2023-04-18 PROCEDURE — 3074F SYST BP LT 130 MM HG: CPT | Performed by: OTOLARYNGOLOGY

## 2023-04-18 RX ORDER — LISINOPRIL 10 MG/1
TABLET ORAL
COMMUNITY
Start: 2023-03-10

## 2023-04-18 NOTE — PROGRESS NOTES
Patient Name: Jeimy Willard   Visit Date: 04/18/2023   Patient ID: [id number]  Provider: Ralph Marie MD    Sex: female  Location: [facility]   YOB: 1955  Location Address: [address] [place] [address], [address],?[place]?[id number]    Primary Care Provider Jeaneth Barker APRN  Location Phone: [phone]    Referring Provider: No ref. provider found        Chief Complaint  Follow-up (Parotid mass )    Subjective    History of Present Illness  Jeimy Willard is a 68 y.o. female who presents to [facility] today as a consult from No ref. provider found.    She presents to the clinic today for follow-up regarding left neck lymph node and history of lymphoma.  She informs me that she has been doing well over the last 6 months, and has had no issues.  She notes that her weight is stable, she denies any fevers and chills, has not had any night sweats.  She denies any lymphadenopathy elsewhere.  She recently had a CT scan which revealed a soft tissue mass below the left parotid measuring 1.3 x 1.2 cm which has not changed from the previous CT scan performed in July 2022.  There was no evidence of lymphadenopathy elsewhere.  A previous PET scan that showed the lymph node or nodule to be 13 mm in size and had an SUV of 2.7.  She has been seeing her oncologist, and there has not been any change in overall it seems that she is doing well.  She is more than 10 years out from her lymphoma diagnosis.  She informs me that she palpates the area frequently, and has had no changes in symptoms or size.    Past Medical History:   Diagnosis Date   • Allergic rhinitis    • Anemia    • Arthritis    • Cancer    • Cholelithiasis 2000   • Colon polyp    • GERD (gastroesophageal reflux disease)    • History of transfusion    • Hypertension    • Insomnia    • Lymphoma 1999    malignant   • Osteoporosis    • Seasonal allergies    • Stomach cancer 2005    malignant   • Stomach  disorder    • Ulcer (traumatic) of oral mucosa    • Vitamin D deficiency        Past Surgical History:   Procedure Laterality Date   • CHOLECYSTECTOMY     • COLONOSCOPY  02/20/2019 2/20/19 poyp 6 mm in ascending colon,polyp 2mm in sigmoid colon,polypectomy,mild diverticulosos of sigmond colon,grade 1 internal hemorrhoids , repeat 3-5 years    • ENDOSCOPY  03/13/2020    normal mucosa in the whole esophagus, nodular tissue visualized at the esophago-jejunal anastomosis, afferent and efferent sm bowel limbs visualized  patchy erthema & single erosion visualized in one jejunal limb.no gastric tissue visualized     • GALLBLADDER SURGERY     • LAPAROSCOPIC TOTAL GASTRECTOMY     • REPLACEMENT TOTAL KNEE Left 2020         Current Outpatient Medications:   •  acetaminophen (TYLENOL) 325 MG tablet, Take 2 tablets by mouth Every 6 (Six) Hours As Needed., Disp: , Rfl:   •  azelastine (ASTELIN) 0.1 % nasal spray, , Disp: , Rfl:   •  Calcium Carb-Cholecalciferol (Os-Nick Calcium + D3) 500-5 MG-MCG tablet per tablet, Take 1 tablet by mouth 2 (Two) Times a Day., Disp: 180 tablet, Rfl: 1  •  cetirizine (zyrTEC) 10 MG tablet, , Disp: , Rfl:   •  cyanocobalamin 1000 MCG/ML injection, , Disp: , Rfl:   •  Diclofenac Sodium (VOLTAREN) 1 % gel gel, Apply 2 g topically to the appropriate area as directed 4 (Four) Times a Day As Needed (knee pain)., Disp: 100 g, Rfl: 1  •  diphenhydrAMINE (BENADRYL) 25 mg capsule, Take 1 capsule by mouth Every 6 (Six) Hours As Needed., Disp: , Rfl:   •  EPINEPHrine (EPIPEN) 0.3 MG/0.3ML solution auto-injector injection, , Disp: , Rfl:   •  famotidine (PEPCID) 40 MG tablet, Take 1 tablet by mouth Daily., Disp: 90 tablet, Rfl: 2  •  fluticasone (FLONASE) 50 MCG/ACT nasal spray, , Disp: , Rfl:   •  folic acid (FOLVITE) 1 MG tablet, Take 1 tablet by mouth Daily., Disp: 90 tablet, Rfl: 3  •  Iron-Vitamin C (Vitron-C)  MG tablet, Vitron-C 65 mg iron- 125 mg oral tablet,delayed release (DR/EC) take 1  tablet by oral route daily   Active, Disp: , Rfl:   •  lisinopril (PRINIVIL,ZESTRIL) 10 MG tablet, , Disp: , Rfl:   •  olopatadine (PATANOL) 0.1 % ophthalmic solution, 1 drop., Disp: , Rfl:   •  pantoprazole (PROTONIX) 40 MG EC tablet, Take 1 tablet by mouth Daily., Disp: 90 tablet, Rfl: 1  •  Premarin 0.625 MG/GM vaginal cream, Insert 2 g into the vagina As Needed (Vaginal irritation)., Disp: 30 g, Rfl: 2  •  Pyridoxine HCl (VITAMIN B-6 PO), , Disp: , Rfl:   •  zolpidem CR (AMBIEN CR) 6.25 MG CR tablet, Take 1 tablet by mouth At Night As Needed for Sleep., Disp: 90 tablet, Rfl: 0  •  albuterol (PROVENTIL) (2.5 MG/3ML) 0.083% nebulizer solution, Take 2.5 mg by nebulization Every 4 (Four) Hours As Needed for Wheezing. (Patient not taking: Reported on 4/18/2023), Disp: 90 mL, Rfl: 1  •  brompheniramine-pseudoephedrine-DM 30-2-10 MG/5ML syrup, Take 5 mL by mouth 4 (Four) Times a Day As Needed for Allergies. (Patient not taking: Reported on 4/18/2023), Disp: 473 mL, Rfl: 0  •  celecoxib (CeleBREX) 200 MG capsule, Take 1 capsule by mouth Daily. (Patient not taking: Reported on 4/18/2023), Disp: 90 capsule, Rfl: 1  •  Cough/Chest Congestion DM  MG/5ML syrup, , Disp: , Rfl:   •  Multiple Vitamins-Minerals (PRESERVISION AREDS 2 PO), PreserVision AREDS 7,160-113-100 unit-mg-unit oral tablet take 1 tablet by oral route 2 times a day   Active (Patient not taking: Reported on 4/18/2023), Disp: , Rfl:     Current Facility-Administered Medications:   •  albuterol (PROVENTIL) nebulizer solution 0.042% 1.25 mg/3mL, 1.25 mg, Nebulization, Q6H PRN, Money, GUNJAN Palmer     Allergies   Allergen Reactions   • Heparin Other (See Comments)     Fever with chills       Family History   Problem Relation Age of Onset   • Breast cancer Mother    • Stroke Mother    • Heart disease Mother    • Hypertension Mother    • Cancer Mother         unspecified   • Diabetes Mother         unspecified type   • Kidney failure Mother    •  "Arthritis Mother    • Kidney disease Mother    • Miscarriages / Stillbirths Mother    • Heart failure Mother    • Diabetes Father         unspecified type   • Heart disease Brother    • Hypertension Brother    • Cancer Brother         unspecified   • Diabetes Brother         unspecified type   • Kidney disease Brother    • Lung cancer Maternal Grandmother         malignant   • Alzheimer's disease Maternal Grandfather    • Esophageal cancer Paternal Grandmother         malignant   • Miscarriages / Stillbirths Sister         Social History     Social History Narrative   • Not on file       Objective     Vital Signs:   /79 (BP Location: Right arm, Patient Position: Sitting, Cuff Size: Large Adult)   Pulse 77   Temp 97.4 °F (36.3 °C) (Tympanic)   Ht 160 cm (63\")   Wt 93.7 kg (206 lb 9.6 oz)   BMI 36.60 kg/m²       Physical Exam         Constitutional   Appearance  · : well developed, well-nourished, alert and in no acute distress, voice clear and strong    Head  Inspection  · : no deformities or lesions  Face  Inspection  · : No facial lesions; House-Brackmann I/VI bilaterally  Palpation  · : No TMJ crepitus nor  muscle tenderness bilaterally    Eyes  Vision  Visual Fields  · : Extraocular movements are intact. No spontaneous or gaze-induced nystagmus.  Conjunctivae  · : clear  Sclerae  · : clear  Pupils and Irises  · : pupils equal, round, and reactive to light.     Ears, Nose, Mouth and Throat    Ears    External Ears  · : appearance within normal limits, no lesions present  Otoscopic Examination  · : Tympanic membrane appearance within normal limits bilaterally without perforations, well-aerated middle ears  Hearing  · : intact to conversational voice both ears  Tunning fork testing:     :    Nose    External Nose  · : appearance normal  Intranasal Exam  · : mucosa within normal limits, vestibules normal, no intranasal lesions present, septum midline, sinuses non tender to percussion  Oral " Cavity    Oral Mucosa  · : oral mucosa normal without pallor or cyanosis  Lips  · : lip appearance normal  Teeth  · : normal dentition for age  Gums  · : gums pink, non-swollen, no bleeding present  Tongue  · : tongue appearance normal; normal mobility  Palate  · : hard palate normal, soft palate appearance normal with symmetric mobility    Throat    Oropharynx  · : no inflammation or lesions present, tonsils within normal limits  Hypopharynx  · : appearance within normal limits, superior epiglottis within normal limits  Larynx  · : appearance within normal limits, vocal cords within normal limits, no lesions present    Neck  Inspection/Palpation  · : normal appearance, stable left neck lymph node measuring about 1 cm, no overlying skin changes, trachea midline; thyroid size normal, nontender, no nodules or masses present on palpation    Respiratory  Respiratory Effort  · : breathing unlabored  Inspection of Chest  · : normal appearance, no retractions    Cardiovascular  Heart  · : regular rate and rhythm    Lymphatic  Neck  · : no lymphadenopathy present  Supraclavicular Nodes  · : no lymphadenopathy present  Preauricular Nodes  · : no lymphadenopathy present    Skin and Subcutaneous Tissue  General Inspection  · : Regarding face and neck - there are no rashes present, no lesions present, and no areas of discoloration    Neurologic  Cranial Nerves  · : cranial nerves II-XII are grossly intact bilaterally  Gait and Station  · : normal gait, able to stand without diffculty    Psychiatric  Judgement and Insight  · : judgment and insight intact  Mood and Affect  · : mood normal, affect appropriate          Assessment and Plan    Diagnoses and all orders for this visit:    1. Parotid mass (Primary)    2. Lymphadenopathy    Examination today revealed a stable left neck lymph node measuring about 1 cm.  There are no overlying skin changes.  Given the stable appearance on CT scan, I recommended observation for now unless  there are any changes as she is not having any symptoms and the node has not really progressed and she has no lymphadenopathy elsewhere.  I did recommend self-exam weekly, and if there are any changes I will be glad to see her back in the clinic for reevaluation.    Follow Up   No follow-ups on file.  Patient was given instructions and counseling regarding her condition or for health maintenance advice. Please see specific information pulled into the AVS if appropriate.

## 2023-05-15 ENCOUNTER — OFFICE VISIT (OUTPATIENT)
Dept: FAMILY MEDICINE CLINIC | Facility: CLINIC | Age: 68
End: 2023-05-15
Payer: MEDICARE

## 2023-05-15 VITALS
SYSTOLIC BLOOD PRESSURE: 137 MMHG | BODY MASS INDEX: 36.21 KG/M2 | OXYGEN SATURATION: 98 % | WEIGHT: 204.4 LBS | HEIGHT: 63 IN | TEMPERATURE: 98.1 F | DIASTOLIC BLOOD PRESSURE: 90 MMHG | HEART RATE: 76 BPM

## 2023-05-15 DIAGNOSIS — Z13.220 SCREENING FOR LIPID DISORDERS: ICD-10-CM

## 2023-05-15 DIAGNOSIS — K21.9 GASTROESOPHAGEAL REFLUX DISEASE WITHOUT ESOPHAGITIS: ICD-10-CM

## 2023-05-15 DIAGNOSIS — F51.01 PRIMARY INSOMNIA: ICD-10-CM

## 2023-05-15 DIAGNOSIS — Z00.00 MEDICARE ANNUAL WELLNESS VISIT, SUBSEQUENT: Primary | ICD-10-CM

## 2023-05-15 DIAGNOSIS — I10 ESSENTIAL HYPERTENSION: ICD-10-CM

## 2023-05-15 DIAGNOSIS — Z79.899 LONG-TERM USE OF HIGH-RISK MEDICATION: ICD-10-CM

## 2023-05-15 DIAGNOSIS — Z13.29 SCREENING FOR THYROID DISORDER: ICD-10-CM

## 2023-05-15 RX ORDER — FAMOTIDINE 40 MG/1
40 TABLET, FILM COATED ORAL DAILY
Qty: 90 TABLET | Refills: 2 | Status: SHIPPED | OUTPATIENT
Start: 2023-05-15

## 2023-05-15 RX ORDER — PANTOPRAZOLE SODIUM 40 MG/1
40 TABLET, DELAYED RELEASE ORAL DAILY
Qty: 90 TABLET | Refills: 1 | Status: SHIPPED | OUTPATIENT
Start: 2023-05-15

## 2023-05-15 RX ORDER — ZOLPIDEM TARTRATE 6.25 MG/1
6.25 TABLET, FILM COATED, EXTENDED RELEASE ORAL NIGHTLY PRN
Qty: 90 TABLET | Refills: 0 | Status: SHIPPED | OUTPATIENT
Start: 2023-05-15

## 2023-05-15 NOTE — PATIENT INSTRUCTIONS
Preventive Care 65 Years and Older, Female  Preventive care refers to lifestyle choices and visits with your health care provider that can promote health and wellness. Preventive care visits are also called wellness exams.  What can I expect for my preventive care visit?  Counseling  Your health care provider may ask you questions about your:  Medical history, including:  Past medical problems.  Family medical history.  Pregnancy and menstrual history.  History of falls.  Current health, including:  Memory and ability to understand (cognition).  Emotional well-being.  Home life and relationship well-being.  Sexual activity and sexual health.  Lifestyle, including:  Alcohol, nicotine or tobacco, and drug use.  Access to firearms.  Diet, exercise, and sleep habits.  Work and work environment.  Sunscreen use.  Safety issues such as seatbelt and bike helmet use.  Physical exam  Your health care provider will check your:  Height and weight. These may be used to calculate your BMI (body mass index). BMI is a measurement that tells if you are at a healthy weight.  Waist circumference. This measures the distance around your waistline. This measurement also tells if you are at a healthy weight and may help predict your risk of certain diseases, such as type 2 diabetes and high blood pressure.  Heart rate and blood pressure.  Body temperature.  Skin for abnormal spots.  What immunizations do I need?  Vaccines are usually given at various ages, according to a schedule. Your health care provider will recommend vaccines for you based on your age, medical history, and lifestyle or other factors, such as travel or where you work.  What tests do I need?  Screening  Your health care provider may recommend screening tests for certain conditions. This may include:  Lipid and cholesterol levels.  Hepatitis C test.  Hepatitis B test.  HIV (human immunodeficiency virus) test.  STI (sexually transmitted infection) testing, if you are at  risk.  Lung cancer screening.  Colorectal cancer screening.  Diabetes screening. This is done by checking your blood sugar (glucose) after you have not eaten for a while (fasting).  Mammogram. Talk with your health care provider about how often you should have regular mammograms.  BRCA-related cancer screening. This may be done if you have a family history of breast, ovarian, tubal, or peritoneal cancers.  Bone density scan. This is done to screen for osteoporosis.  Talk with your health care provider about your test results, treatment options, and if necessary, the need for more tests.  Follow these instructions at home:  Eating and drinking    Eat a diet that includes fresh fruits and vegetables, whole grains, lean protein, and low-fat dairy products. Limit your intake of foods with high amounts of sugar, saturated fats, and salt.  Take vitamin and mineral supplements as recommended by your health care provider.  Do not drink alcohol if your health care provider tells you not to drink.  If you drink alcohol:  Limit how much you have to 0-1 drink a day.  Know how much alcohol is in your drink. In the [place] one drink equals one 12 oz bottle of beer (355 mL), one 5 oz glass of wine (148 mL), or one 1½ oz glass of hard liquor (44 mL).  Lifestyle  Brush your teeth every morning and night with fluoride toothpaste. Floss one time each day.  Exercise for at least 30 minutes 5 or more days each week.  Do not use any products that contain nicotine or tobacco. These products include cigarettes, chewing tobacco, and vaping devices, such as e-cigarettes. If you need help quitting, ask your health care provider.  Do not use drugs.  If you are sexually active, practice safe sex. Use a condom or other form of protection in order to prevent STIs.  Take aspirin only as told by your health care provider. Make sure that you understand how much to take and what form to take. Work with your health care provider to find out whether it  is safe and beneficial for you to take aspirin daily.  Ask your health care provider if you need to take a cholesterol-lowering medicine (statin).  Find healthy ways to manage stress, such as:  Meditation, yoga, or listening to music.  Journaling.  Talking to a trusted person.  Spending time with friends and family.  Minimize exposure to UV radiation to reduce your risk of skin cancer.  Safety  Always wear your seat belt while driving or riding in a vehicle.  Do not drive:  If you have been drinking alcohol. Do not ride with someone who has been drinking.  When you are tired or distracted.  While texting.  If you have been using any mind-altering substances or drugs.  Wear a helmet and other protective equipment during sports activities.  If you have firearms in your house, make sure you follow all gun safety procedures.  What's next?  Visit your health care provider once a year for an annual wellness visit.  Ask your health care provider how often you should have your eyes and teeth checked.  Stay up to date on all vaccines.  This information is not intended to replace advice given to you by your health care provider. Make sure you discuss any questions you have with your health care provider.  Document Revised: 06/15/2022 Document Reviewed: 06/15/2022  Elsevier Patient Education © 2022 [employer]

## 2023-05-16 NOTE — PROGRESS NOTES
Chief Complaint  Insomnia (Follow up)    Subjective          Jeimy Willard is a 68 y.o. female who presents to [facility]    History of Present Illness    Allergies flaring congestion left sided, pt reports pnd is causing her to cough. Pt is compliant with allergy shots and allergy Meds. Productive cough with yellow mucous ongoing for over 1 week.     Insomnia - pt denies any med se. Pt is sleeping 6-8 hours per night. Pt awakens rested most mornings.       PHQ-2 Total Score: 0   PHQ-9 Total Score: 0        Review of Systems   Constitutional:  Negative for chills, fatigue and fever.   HENT:  Positive for postnasal drip.    Respiratory:  Positive for cough. Negative for shortness of breath.    Cardiovascular:  Negative for chest pain and palpitations.   Gastrointestinal:  Negative for constipation, diarrhea, nausea and vomiting.   Musculoskeletal:  Negative for back pain and neck pain.   Skin:  Negative for rash.   Allergic/Immunologic: Positive for environmental allergies.   Neurological:  Negative for dizziness and headaches.   Psychiatric/Behavioral:  Positive for sleep disturbance. Negative for self-injury and suicidal ideas.         Medical History: has a past medical history of Allergic rhinitis, Anemia, Arthritis, Cancer, Cholelithiasis (2000), Colon polyp, GERD (gastroesophageal reflux disease), History of transfusion, Hypertension, Insomnia, Lymphoma (1999), Osteoporosis, Seasonal allergies, Stomach cancer (2005), Stomach disorder, Ulcer (traumatic) of oral mucosa, and Vitamin D deficiency.     Surgical History: has a past surgical history that includes Cholecystectomy; Colonoscopy (02/20/2019); Esophagogastroduodenoscopy (03/13/2020); Gallbladder surgery; Laparoscopic total gastrectomy; and Replacement total knee (Left, 2020).     Family History: family history includes Alzheimer's disease in her maternal grandfather; Arthritis in her mother; Breast cancer in her mother;  Cancer in her brother and mother; Diabetes in her brother, father, and mother; Esophageal cancer in her paternal grandmother; Heart disease in her brother and mother; Heart failure in her mother; Hypertension in her brother and mother; Kidney disease in her brother and mother; Kidney failure in her mother; Lung cancer in her maternal grandmother; Miscarriages / Stillbirths in her mother and sister; Stroke in her mother.     Social History: reports that she has never smoked. She has never been exposed to tobacco smoke. She has never used smokeless tobacco. She reports that she does not drink alcohol and does not use drugs.    Allergies: Heparin      Health Maintenance Due   Topic Date Due    TDAP/TD VACCINES (1 - Tdap) Never done    HEPATITIS C SCREENING  Never done    COVID-19 Vaccine (5 - Pfizer series) 06/11/2022            Current Outpatient Medications:     acetaminophen (TYLENOL) 325 MG tablet, Take 2 tablets by mouth Every 6 (Six) Hours As Needed., Disp: , Rfl:     albuterol (PROVENTIL) (2.5 MG/3ML) 0.083% nebulizer solution, Take 2.5 mg by nebulization Every 4 (Four) Hours As Needed for Wheezing., Disp: 90 mL, Rfl: 1    azelastine (ASTELIN) 0.1 % nasal spray, , Disp: , Rfl:     Calcium Carb-Cholecalciferol (Os-Nick Calcium + D3) 500-5 MG-MCG tablet per tablet, Take 1 tablet by mouth 2 (Two) Times a Day., Disp: 180 tablet, Rfl: 1    celecoxib (CeleBREX) 200 MG capsule, Take 1 capsule by mouth Daily., Disp: 90 capsule, Rfl: 1    cetirizine (zyrTEC) 10 MG tablet, , Disp: , Rfl:     cyanocobalamin 1000 MCG/ML injection, , Disp: , Rfl:     Diclofenac Sodium (VOLTAREN) 1 % gel gel, Apply 2 g topically to the appropriate area as directed 4 (Four) Times a Day As Needed (knee pain)., Disp: 100 g, Rfl: 1    diphenhydrAMINE (BENADRYL) 25 mg capsule, Take 1 capsule by mouth Every 6 (Six) Hours As Needed., Disp: , Rfl:     famotidine (PEPCID) 40 MG tablet, Take 1 tablet by mouth Daily., Disp: 90 tablet, Rfl: 2     fluticasone (FLONASE) 50 MCG/ACT nasal spray, , Disp: , Rfl:     folic acid (FOLVITE) 1 MG tablet, Take 1 tablet by mouth Daily., Disp: 90 tablet, Rfl: 3    Iron-Vitamin C (Vitron-C)  MG tablet, Vitron-C 65 mg iron- 125 mg oral tablet,delayed release (DR/EC) take 1 tablet by oral route daily   Active, Disp: , Rfl:     lisinopril (PRINIVIL,ZESTRIL) 10 MG tablet, , Disp: , Rfl:     olopatadine (PATANOL) 0.1 % ophthalmic solution, 1 drop., Disp: , Rfl:     pantoprazole (PROTONIX) 40 MG EC tablet, Take 1 tablet by mouth Daily., Disp: 90 tablet, Rfl: 1    Premarin 0.625 MG/GM vaginal cream, Insert 2 g into the vagina As Needed (Vaginal irritation)., Disp: 30 g, Rfl: 2    Pyridoxine HCl (VITAMIN B-6 PO), , Disp: , Rfl:     zolpidem CR (AMBIEN CR) 6.25 MG CR tablet, Take 1 tablet by mouth At Night As Needed for Sleep., Disp: 90 tablet, Rfl: 0    azithromycin (Zithromax) 250 MG tablet, 2 tabs on day one and 1 tab day 2-5., Disp: 6 tablet, Rfl: 0    brompheniramine-pseudoephedrine-DM 30-2-10 MG/5ML syrup, Take 5 mL by mouth 4 (Four) Times a Day As Needed for Allergies. (Patient not taking: Reported on 4/18/2023), Disp: 473 mL, Rfl: 0    Cough/Chest Congestion DM  MG/5ML syrup, , Disp: , Rfl:     EPINEPHrine (EPIPEN) 0.3 MG/0.3ML solution auto-injector injection, , Disp: , Rfl:     methylPREDNISolone (MEDROL) 4 MG dose pack, Take as directed on package instructions., Disp: 1 each, Rfl: 0    Current Facility-Administered Medications:     albuterol (PROVENTIL) nebulizer solution 0.042% 1.25 mg/3mL, 1.25 mg, Nebulization, Q6H PRN, Money, Marzena Solomon, GUNJAN      Immunization History   Administered Date(s) Administered    COVID-19 (PFIZER) Purple Cap Monovalent 03/10/2021, 03/31/2021, 10/22/2021    Flu Vaccine Quad PF >36MO 09/26/2020    FluLaval/Fluzone >6mos 09/17/2021    FluMist 2-49yrs 09/26/2020    Fluzone Quad >6mos (Multi-dose) 09/26/2020    Pneumococcal Conjugate 20-Valent (PCV20) 11/23/2022    Shingrix  12/19/2021, 03/11/2022         Objective       Vitals:    08/07/23 1033   BP: 121/82   Pulse: 76   Temp: 97.1 øF (36.2 øC)   TempSrc: Temporal   SpO2: 98%   Weight: 92.5 kg (204 lb)      Body mass index is 36.14 kg/mý.   Wt Readings from Last 3 Encounters:   08/07/23 92.5 kg (204 lb)   05/15/23 92.7 kg (204 lb 6.4 oz)   04/18/23 93.7 kg (206 lb 9.6 oz)      BP Readings from Last 3 Encounters:   08/07/23 121/82   05/15/23 137/90   04/18/23 121/79        Physical Exam  Vitals reviewed.   Constitutional:       Appearance: Normal appearance. She is well-developed.   HENT:      Head: Normocephalic and atraumatic.      Right Ear: No middle ear effusion. There is no impacted cerumen.      Left Ear:  No middle ear effusion. There is no impacted cerumen.      Mouth/Throat:      Pharynx: Posterior oropharyngeal erythema present.      Comments: Generalized erythema  Eyes:      Conjunctiva/sclera: Conjunctivae normal.      Pupils: Pupils are equal, round, and reactive to light.   Cardiovascular:      Rate and Rhythm: Normal rate and regular rhythm.      Heart sounds: Normal heart sounds. No murmur heard.  Pulmonary:      Effort: Pulmonary effort is normal.      Breath sounds: Normal breath sounds. No wheezing or rhonchi.   Abdominal:      General: Bowel sounds are normal. There is no distension.      Palpations: Abdomen is soft.      Tenderness: There is no abdominal tenderness.   Skin:     General: Skin is warm and dry.   Neurological:      Mental Status: She is alert and oriented to person, place, and time.   Psychiatric:         Mood and Affect: Mood and affect normal.         Behavior: Behavior normal.         Thought Content: Thought content normal.         Judgment: Judgment normal.           Result Review :       Common labs          5/31/2023    07:17   Common Labs   Glucose 87    BUN 9    Creatinine 1.19    Sodium 137    Potassium 4.3    Chloride 104    Calcium 9.4    Albumin 3.7    Total Bilirubin 0.3    Alkaline  Phosphatase 91    AST (SGOT) 33    ALT (SGPT) 28    WBC 6.52    Hemoglobin 12.2    Hematocrit 36.9    Platelets 329    Total Cholesterol 183    Triglycerides 113    HDL Cholesterol 70    LDL Cholesterol  93                       Assessment and Plan        Diagnoses and all orders for this visit:    1. Primary insomnia (Primary)  -     zolpidem CR (AMBIEN CR) 6.25 MG CR tablet; Take 1 tablet by mouth At Night As Needed for Sleep.  Dispense: 90 tablet; Refill: 0    2. Screening mammogram for breast cancer  -     Mammo Screening Digital Tomosynthesis Bilateral With CAD; Future    3. Chronic pain of left knee  -     Diclofenac Sodium (VOLTAREN) 1 % gel gel; Apply 2 g topically to the appropriate area as directed 4 (Four) Times a Day As Needed (knee pain).  Dispense: 100 g; Refill: 1    4. Menopausal disorder  -     Premarin 0.625 MG/GM vaginal cream; Insert 2 g into the vagina As Needed (Vaginal irritation).  Dispense: 30 g; Refill: 2    5. Seasonal allergic rhinitis due to pollen  -     methylPREDNISolone (MEDROL) 4 MG dose pack; Take as directed on package instructions.  Dispense: 1 each; Refill: 0    6. Bronchitis  -     azithromycin (Zithromax) 250 MG tablet; 2 tabs on day one and 1 tab day 2-5.  Dispense: 6 tablet; Refill: 0          Follow Up     Return in about 3 months (around 11/7/2023) for Next scheduled follow up.    Patient was given instructions and counseling regarding her condition or for health maintenance advice. Please see specific information pulled into the AVS if appropriate.     GUNJAN Malloy

## 2023-05-31 ENCOUNTER — LAB (OUTPATIENT)
Dept: LAB | Facility: HOSPITAL | Age: 68
End: 2023-05-31

## 2023-05-31 LAB
ALBUMIN SERPL-MCNC: 3.7 G/DL (ref 3.5–5.2)
ALBUMIN/GLOB SERPL: 0.9 G/DL
ALP SERPL-CCNC: 91 U/L (ref 39–117)
ALT SERPL W P-5'-P-CCNC: 28 U/L (ref 1–33)
ANION GAP SERPL CALCULATED.3IONS-SCNC: 11.9 MMOL/L (ref 5–15)
AST SERPL-CCNC: 33 U/L (ref 1–32)
BILIRUB SERPL-MCNC: 0.3 MG/DL (ref 0–1.2)
BUN SERPL-MCNC: 9 MG/DL (ref 8–23)
BUN/CREAT SERPL: 7.6 (ref 7–25)
CALCIUM SPEC-SCNC: 9.4 MG/DL (ref 8.6–10.5)
CHLORIDE SERPL-SCNC: 104 MMOL/L (ref 98–107)
CHOLEST SERPL-MCNC: 183 MG/DL (ref 0–200)
CO2 SERPL-SCNC: 21.1 MMOL/L (ref 22–29)
CREAT SERPL-MCNC: 1.19 MG/DL (ref 0.57–1)
DEPRECATED RDW RBC AUTO: 44.4 FL (ref 37–54)
EGFRCR SERPLBLD CKD-EPI 2021: 50.5 ML/MIN/1.73
ERYTHROCYTE [DISTWIDTH] IN BLOOD BY AUTOMATED COUNT: 15.1 % (ref 12.3–15.4)
GLOBULIN UR ELPH-MCNC: 4.1 GM/DL
GLUCOSE SERPL-MCNC: 87 MG/DL (ref 65–99)
HCT VFR BLD AUTO: 36.9 % (ref 34–46.6)
HDLC SERPL-MCNC: 70 MG/DL (ref 40–60)
HGB BLD-MCNC: 12.2 G/DL (ref 12–15.9)
LDLC SERPL CALC-MCNC: 93 MG/DL (ref 0–100)
LDLC/HDLC SERPL: 1.29 {RATIO}
MCH RBC QN AUTO: 26.8 PG (ref 26.6–33)
MCHC RBC AUTO-ENTMCNC: 33.1 G/DL (ref 31.5–35.7)
MCV RBC AUTO: 81.1 FL (ref 79–97)
PLATELET # BLD AUTO: 329 10*3/MM3 (ref 140–450)
PMV BLD AUTO: 10 FL (ref 6–12)
POTASSIUM SERPL-SCNC: 4.3 MMOL/L (ref 3.5–5.2)
PROT SERPL-MCNC: 7.8 G/DL (ref 6–8.5)
RBC # BLD AUTO: 4.55 10*6/MM3 (ref 3.77–5.28)
SODIUM SERPL-SCNC: 137 MMOL/L (ref 136–145)
TRIGL SERPL-MCNC: 113 MG/DL (ref 0–150)
TSH SERPL DL<=0.05 MIU/L-ACNC: 3.6 UIU/ML (ref 0.27–4.2)
VLDLC SERPL-MCNC: 20 MG/DL (ref 5–40)
WBC NRBC COR # BLD: 6.52 10*3/MM3 (ref 3.4–10.8)

## 2023-05-31 PROCEDURE — 80061 LIPID PANEL: CPT | Performed by: NURSE PRACTITIONER

## 2023-05-31 PROCEDURE — 85027 COMPLETE CBC AUTOMATED: CPT | Performed by: NURSE PRACTITIONER

## 2023-05-31 PROCEDURE — 84443 ASSAY THYROID STIM HORMONE: CPT | Performed by: NURSE PRACTITIONER

## 2023-05-31 PROCEDURE — 80053 COMPREHEN METABOLIC PANEL: CPT | Performed by: NURSE PRACTITIONER

## 2023-08-07 ENCOUNTER — OFFICE VISIT (OUTPATIENT)
Dept: FAMILY MEDICINE CLINIC | Facility: CLINIC | Age: 68
End: 2023-08-07
Payer: MEDICARE

## 2023-08-07 VITALS
HEART RATE: 76 BPM | TEMPERATURE: 97.1 F | OXYGEN SATURATION: 98 % | WEIGHT: 204 LBS | SYSTOLIC BLOOD PRESSURE: 121 MMHG | DIASTOLIC BLOOD PRESSURE: 82 MMHG | BODY MASS INDEX: 36.14 KG/M2

## 2023-08-07 DIAGNOSIS — G89.29 CHRONIC PAIN OF LEFT KNEE: ICD-10-CM

## 2023-08-07 DIAGNOSIS — N95.9 MENOPAUSAL DISORDER: ICD-10-CM

## 2023-08-07 DIAGNOSIS — J30.1 SEASONAL ALLERGIC RHINITIS DUE TO POLLEN: ICD-10-CM

## 2023-08-07 DIAGNOSIS — F51.01 PRIMARY INSOMNIA: Primary | ICD-10-CM

## 2023-08-07 DIAGNOSIS — M25.562 CHRONIC PAIN OF LEFT KNEE: ICD-10-CM

## 2023-08-07 DIAGNOSIS — J40 BRONCHITIS: ICD-10-CM

## 2023-08-07 DIAGNOSIS — Z12.31 SCREENING MAMMOGRAM FOR BREAST CANCER: ICD-10-CM

## 2023-08-07 PROCEDURE — 1160F RVW MEDS BY RX/DR IN RCRD: CPT | Performed by: NURSE PRACTITIONER

## 2023-08-07 PROCEDURE — 3079F DIAST BP 80-89 MM HG: CPT | Performed by: NURSE PRACTITIONER

## 2023-08-07 PROCEDURE — 99214 OFFICE O/P EST MOD 30 MIN: CPT | Performed by: NURSE PRACTITIONER

## 2023-08-07 PROCEDURE — 1159F MED LIST DOCD IN RCRD: CPT | Performed by: NURSE PRACTITIONER

## 2023-08-07 PROCEDURE — 3074F SYST BP LT 130 MM HG: CPT | Performed by: NURSE PRACTITIONER

## 2023-08-07 RX ORDER — ZOLPIDEM TARTRATE 6.25 MG/1
6.25 TABLET, FILM COATED, EXTENDED RELEASE ORAL NIGHTLY PRN
Qty: 90 TABLET | Refills: 0 | Status: SHIPPED | OUTPATIENT
Start: 2023-08-07

## 2023-08-07 RX ORDER — CONJUGATED ESTROGENS 0.62 MG/G
2 CREAM VAGINAL AS NEEDED
Qty: 30 G | Refills: 2 | Status: SHIPPED | OUTPATIENT
Start: 2023-08-07

## 2023-08-07 RX ORDER — AZITHROMYCIN 250 MG/1
TABLET, FILM COATED ORAL
Qty: 6 TABLET | Refills: 0 | Status: SHIPPED | OUTPATIENT
Start: 2023-08-07

## 2023-08-07 RX ORDER — METHYLPREDNISOLONE 4 MG/1
TABLET ORAL
Qty: 1 EACH | Refills: 0 | Status: SHIPPED | OUTPATIENT
Start: 2023-08-07

## 2023-08-07 NOTE — PATIENT INSTRUCTIONS
Insomnia  Insomnia is a sleep disorder that makes it difficult to fall asleep or stay asleep. Insomnia can cause fatigue, low energy, difficulty concentrating, mood swings, and poor performance at work or school.  There are three different ways to classify insomnia:  Difficulty falling asleep.  Difficulty staying asleep.  Waking up too early in the morning.  Any type of insomnia can be long-term (chronic) or short-term (acute). Both are common. Short-term insomnia usually lasts for 3 months or less. Chronic insomnia occurs at least three times a week for longer than 3 months.  What are the causes?  Insomnia may be caused by another condition, situation, or substance, such as:  Having certain mental health conditions, such as anxiety and depression.  Using caffeine, alcohol, tobacco, or drugs.  Having gastrointestinal conditions, such as gastroesophageal reflux disease (GERD).  Having certain medical conditions. These include:  Asthma.  Alzheimer's disease.  Stroke.  Chronic pain.  An overactive thyroid gland (hyperthyroidism).  Other sleep disorders, such as restless legs syndrome and sleep apnea.  Menopause.  Sometimes, the cause of insomnia may not be known.  What increases the risk?  Risk factors for insomnia include:  Gender. Females are affected more often than males.  Age. Insomnia is more common as people get older.  Stress and certain medical and mental health conditions.  Lack of exercise.  Having an irregular work schedule. This may include working night shifts and traveling between different time zones.  What are the signs or symptoms?  If you have insomnia, the main symptom is having trouble falling asleep or having trouble staying asleep. This may lead to other symptoms, such as:  Feeling tired or having low energy.  Feeling nervous about going to sleep.  Not feeling rested in the morning.  Having trouble concentrating.  Feeling irritable, anxious, or depressed.  How is this diagnosed?  This condition  may be diagnosed based on:  Your symptoms and medical history. Your health care provider may ask about:  Your sleep habits.  Any medical conditions you have.  Your mental health.  A physical exam.  How is this treated?  Treatment for insomnia depends on the cause. Treatment may focus on treating an underlying condition that is causing the insomnia. Treatment may also include:  Medicines to help you sleep.  Counseling or therapy.  Lifestyle adjustments to help you sleep better.  Follow these instructions at home:  Eating and drinking    Limit or avoid alcohol, caffeinated beverages, and products that contain nicotine and tobacco, especially close to bedtime. These can disrupt your sleep.  Do not eat a large meal or eat spicy foods right before bedtime. This can lead to digestive discomfort that can make it hard for you to sleep.  Sleep habits    Keep a sleep diary to help you and your health care provider figure out what could be causing your insomnia. Write down:  When you sleep.  When you wake up during the night.  How well you sleep and how rested you feel the next day.  Any side effects of medicines you are taking.  What you eat and drink.  Make your bedroom a dark, comfortable place where it is easy to fall asleep.  Put up shades or blackout curtains to block light from outside.  Use a white noise machine to block noise.  Keep the temperature cool.  Limit screen use before bedtime. This includes:  Not watching TV.  Not using your smartphone, tablet, or computer.  Stick to a routine that includes going to bed and waking up at the same times every day and night. This can help you fall asleep faster. Consider making a quiet activity, such as reading, part of your nighttime routine.  Try to avoid taking naps during the day so that you sleep better at night.  Get out of bed if you are still awake after 15 minutes of trying to sleep. Keep the lights down, but try reading or doing a quiet activity. When you feel  sleepy, go back to bed.  General instructions  Take over-the-counter and prescription medicines only as told by your health care provider.  Exercise regularly as told by your health care provider. However, avoid exercising in the hours right before bedtime.  Use relaxation techniques to manage stress. Ask your health care provider to suggest some techniques that may work well for you. These may include:  Breathing exercises.  Routines to release muscle tension.  Visualizing peaceful scenes.  Make sure that you drive carefully. Do not drive if you feel very sleepy.  Keep all follow-up visits. This is important.  Contact a health care provider if:  You are tired throughout the day.  You have trouble in your daily routine due to sleepiness.  You continue to have sleep problems, or your sleep problems get worse.  Get help right away if:  You have thoughts about hurting yourself or someone else.  Get help right away if you feel like you may hurt yourself or others, or have thoughts about taking your own life. Go to your nearest emergency room or:  Call 911.  Call the National Suicide Prevention Lifeline at 1-804.598.5074 or 467. This is open 24 hours a day.  Text the Crisis Text Line at 560167.  Summary  Insomnia is a sleep disorder that makes it difficult to fall asleep or stay asleep.  Insomnia can be long-term (chronic) or short-term (acute).  Treatment for insomnia depends on the cause. Treatment may focus on treating an underlying condition that is causing the insomnia.  Keep a sleep diary to help you and your health care provider figure out what could be causing your insomnia.  This information is not intended to replace advice given to you by your health care provider. Make sure you discuss any questions you have with your health care provider.  Document Revised: 11/28/2022 Document Reviewed: 11/28/2022  Elsevier Patient Education c 2023 [employer]

## 2023-08-15 NOTE — PROGRESS NOTES
Chief Complaint  Insomnia (FOLLOW UP)    Subjective          Jeimy Willard is a 68 y.o. female who presents to [facility]    History of Present Illness    Insomnia - Patient sleeps around 7-8 hours a night. Wakes up around 2 times a night to use the restroom. Wakes up feeling rested. No side effects with medication.     GERD - Reflux well controlled on medication.     Back pain/sciatica - Flexeril and gabapentin helping with the back pain and sciatica. Patient is able to complete ADLS. Patient tried to cut back on Gabapentin unsuccessfully.     Patient has shingles from lumbar spine all the way down her left leg. The percocet helps to take the edge off of the shingles but the pain is still present. Saw Dr. Owusu for her shingles. Shingles spots are starting to scab over at this time. Patient is taking her Percocet every 12 hours as opposed to every 6 hours and tried to wean down to half a tablet in the morning unsuccessfully. Patient complains of itching where lesions used to be at nighttime where it becomes unbearable. Patient is putting Aquaphor and an OTC cream from [employer].     RA - Overall patient is doing okay, occasionally has a flare up.      Review of Systems   Constitutional:  Negative for chills, fatigue and fever.   Respiratory:  Negative for cough and shortness of breath.    Cardiovascular:  Negative for chest pain and palpitations.   Gastrointestinal:  Negative for constipation, diarrhea, nausea and vomiting.   Musculoskeletal:  Positive for arthralgias and back pain. Negative for neck pain.   Skin:  Positive for rash and wound.   Neurological:  Negative for dizziness and headaches.   Psychiatric/Behavioral:  Positive for sleep disturbance.           Medical History: has a past medical history of Allergic rhinitis, Anemia, Arthritis, Cancer, Cholelithiasis (2000), Colon polyp, GERD (gastroesophageal reflux disease), History of transfusion, Hypertension, Insomnia,  Lymphoma (1999), Osteoporosis, Seasonal allergies, Stomach cancer (2005), Stomach disorder, Ulcer (traumatic) of oral mucosa, and Vitamin D deficiency.     Surgical History: has a past surgical history that includes Cholecystectomy; Colonoscopy (02/20/2019); Esophagogastroduodenoscopy (03/13/2020); Gallbladder surgery; Laparoscopic total gastrectomy; and Replacement total knee (Left, 2020).     Family History: family history includes Alzheimer's disease in her maternal grandfather; Arthritis in her mother; Breast cancer in her mother; Cancer in her brother and mother; Diabetes in her brother, father, and mother; Esophageal cancer in her paternal grandmother; Heart disease in her brother and mother; Heart failure in her mother; Hypertension in her brother and mother; Kidney disease in her brother and mother; Kidney failure in her mother; Lung cancer in her maternal grandmother; Miscarriages / Stillbirths in her mother and sister; Stroke in her mother.     Social History: reports that she has never smoked. She has never been exposed to tobacco smoke. She has never used smokeless tobacco. She reports that she does not drink alcohol and does not use drugs.    Allergies: Heparin      Health Maintenance Due   Topic Date Due    TDAP/TD VACCINES (1 - Tdap) Never done    HEPATITIS C SCREENING  Never done    COVID-19 Vaccine (5 - 2023-24 season) 09/01/2023            Current Outpatient Medications:     acetaminophen (TYLENOL) 325 MG tablet, Take 2 tablets by mouth Every 6 (Six) Hours As Needed., Disp: , Rfl:     azelastine (ASTELIN) 0.1 % nasal spray, , Disp: , Rfl:     Calcium Carb-Cholecalciferol (Os-Nick Calcium + D3) 500-5 MG-MCG tablet per tablet, Take 1 tablet by mouth 2 (Two) Times a Day., Disp: 180 tablet, Rfl: 1    celecoxib (CeleBREX) 200 MG capsule, Take 1 capsule by mouth Daily., Disp: 90 capsule, Rfl: 1    cetirizine (zyrTEC) 10 MG tablet, , Disp: , Rfl:     Cough/Chest Congestion DM  MG/5ML syrup, , Disp: ,  Rfl:     cyanocobalamin 1000 MCG/ML injection, , Disp: , Rfl:     cyclobenzaprine (FLEXERIL) 10 MG tablet, Take 1 tablet by mouth 3 (Three) Times a Day As Needed for Muscle Spasms., Disp: 30 tablet, Rfl: 2    Diclofenac Sodium (VOLTAREN) 1 % gel gel, Apply 2 g topically to the appropriate area as directed 4 (Four) Times a Day As Needed (knee pain)., Disp: 100 g, Rfl: 1    diphenhydrAMINE (BENADRYL) 25 mg capsule, Take 1 capsule by mouth Every 6 (Six) Hours As Needed., Disp: , Rfl:     famotidine (PEPCID) 40 MG tablet, Take 1 tablet by mouth Daily., Disp: 90 tablet, Rfl: 2    fluticasone (FLONASE) 50 MCG/ACT nasal spray, , Disp: , Rfl:     folic acid (FOLVITE) 1 MG tablet, Take 1 tablet by mouth Daily., Disp: 90 tablet, Rfl: 3    Iron-Vitamin C (Vitron-C)  MG tablet, Vitron-C 65 mg iron- 125 mg oral tablet,delayed release (DR/EC) take 1 tablet by oral route daily   Active, Disp: , Rfl:     lisinopril (PRINIVIL,ZESTRIL) 10 MG tablet, , Disp: , Rfl:     olopatadine (PATANOL) 0.1 % ophthalmic solution, 1 drop., Disp: , Rfl:     pantoprazole (PROTONIX) 40 MG EC tablet, Take 1 tablet by mouth Daily., Disp: 90 tablet, Rfl: 1    Premarin 0.625 MG/GM vaginal cream, Insert 2 g into the vagina As Needed (Vaginal irritation)., Disp: 30 g, Rfl: 2    Pyridoxine HCl (VITAMIN B-6 PO), , Disp: , Rfl:     zolpidem CR (AMBIEN CR) 6.25 MG CR tablet, Take 1 tablet by mouth At Night As Needed for Sleep., Disp: 90 tablet, Rfl: 0    bacitracin 500 UNIT/GM ointment, Apply 1 application  topically to the appropriate area as directed 2 (Two) Times a Day., Disp: 453.9 g, Rfl: 0    EPINEPHrine (EPIPEN) 0.3 MG/0.3ML solution auto-injector injection, , Disp: , Rfl:     gabapentin (NEURONTIN) 300 MG capsule, Take 1 capsule by mouth 3 (Three) Times a Day., Disp: 120 capsule, Rfl: 0    hydrOXYzine (ATARAX) 25 MG tablet, Take 1 tablet by mouth Every 6 (Six) Hours As Needed for Itching., Disp: 30 tablet, Rfl: 1    oxyCODONE-acetaminophen  "(Percocet) 5-325 MG per tablet, Take 1 tablet by mouth Every 6 (Six) Hours As Needed for Moderate Pain., Disp: 30 tablet, Rfl: 0    Current Facility-Administered Medications:     albuterol (PROVENTIL) nebulizer solution 0.042% 1.25 mg/3mL, 1.25 mg, Nebulization, Q6H PRN, Carina, Marzena Neha, GUNJAN      Immunization History   Administered Date(s) Administered    COVID-19 (PFIZER) Purple Cap Monovalent 03/10/2021, 03/31/2021, 10/22/2021    Flu Vaccine Quad PF >36MO 09/26/2020    FluMist 2-49yrs 09/26/2020    Fluzone (or Fluarix & Flulaval for VFC) >6mos 09/17/2021    Fluzone High-Dose 65+yrs 10/04/2023    Fluzone Quad >6mos (Multi-dose) 09/26/2020    Pneumococcal Conjugate 20-Valent (PCV20) 11/23/2022    Shingrix 12/19/2021, 03/11/2022         Objective       Vitals:    11/02/23 0806   BP: 101/69   Pulse: 65   Temp: 96.2 °F (35.7 °C)   TempSrc: Temporal   SpO2: 100%   Weight: 89.8 kg (198 lb)   Height: 160 cm (62.99\")      Body mass index is 35.08 kg/m².   Wt Readings from Last 3 Encounters:   11/02/23 89.8 kg (198 lb)   10/31/23 91.6 kg (202 lb)   10/09/23 91.6 kg (202 lb)      BP Readings from Last 3 Encounters:   11/02/23 101/69   10/09/23 119/76   10/04/23 146/87                Physical Exam  Constitutional:       Appearance: She is well-developed.   HENT:      Head: Normocephalic and atraumatic.      Right Ear: External ear normal.      Left Ear: External ear normal.      Nose: Nose normal.   Eyes:      General: No scleral icterus.        Right eye: No discharge.         Left eye: No discharge.      Conjunctiva/sclera: Conjunctivae normal.      Pupils: Pupils are equal, round, and reactive to light.   Neck:      Thyroid: No thyromegaly.      Vascular: Normal carotid pulses. No carotid bruit.   Cardiovascular:      Rate and Rhythm: Normal rate and regular rhythm.      Heart sounds: Normal heart sounds. No murmur heard.  Pulmonary:      Effort: Pulmonary effort is normal. No tachypnea, bradypnea, accessory muscle " usage or respiratory distress.      Breath sounds: Normal breath sounds. No wheezing or rales.   Chest:      Chest wall: No mass or tenderness.   Abdominal:      General: Bowel sounds are normal. There is no distension.      Palpations: Abdomen is soft. There is no hepatomegaly, splenomegaly or mass.      Tenderness: There is no abdominal tenderness. There is no guarding or rebound.      Hernia: No hernia is present.   Musculoskeletal:         General: No tenderness. Normal range of motion.      Cervical back: Normal range of motion and neck supple. No edema, erythema or rigidity. No muscular tenderness. Normal range of motion.   Lymphadenopathy:      Head:      Right side of head: No submental, submandibular, tonsillar, preauricular, posterior auricular or occipital adenopathy.      Left side of head: No submental, submandibular, tonsillar, preauricular, posterior auricular or occipital adenopathy.      Cervical: No cervical adenopathy.      Right cervical: No superficial, deep or posterior cervical adenopathy.     Left cervical: No superficial, deep or posterior cervical adenopathy.      Upper Body:      Right upper body: No supraclavicular adenopathy.      Left upper body: No supraclavicular adenopathy.   Skin:     General: Skin is warm and dry.      Capillary Refill: Capillary refill takes less than 2 seconds.      Findings: No abrasion, bruising, ecchymosis, erythema, laceration, lesion or rash.      Nails: There is no clubbing.             Comments: Multiple erythematous patches with open wounds no drainage noted. Left knee region has 2 in by 1 in full thickness loss with scabbing.   Neurological:      Mental Status: She is alert and oriented to person, place, and time.      Cranial Nerves: No cranial nerve deficit.      Motor: No abnormal muscle tone.      Coordination: Coordination normal.      Deep Tendon Reflexes: Reflexes normal.   Psychiatric:         Behavior: Behavior normal.         Thought Content:  Thought content normal.         Judgment: Judgment normal.             Result Review :       Common labs          5/31/2023    07:17   Common Labs   Glucose 87    BUN 9    Creatinine 1.19    Sodium 137    Potassium 4.3    Chloride 104    Calcium 9.4    Albumin 3.7    Total Bilirubin 0.3    Alkaline Phosphatase 91    AST (SGOT) 33    ALT (SGPT) 28    WBC 6.52    Hemoglobin 12.2    Hematocrit 36.9    Platelets 329    Total Cholesterol 183    Triglycerides 113    HDL Cholesterol 70    LDL Cholesterol  93                       Assessment and Plan        Diagnoses and all orders for this visit:    1. Pruritus (Primary)  -     hydrOXYzine (ATARAX) 25 MG tablet; Take 1 tablet by mouth Every 6 (Six) Hours As Needed for Itching.  Dispense: 30 tablet; Refill: 1    2. Sciatica of left side  -     cyclobenzaprine (FLEXERIL) 10 MG tablet; Take 1 tablet by mouth 3 (Three) Times a Day As Needed for Muscle Spasms.  Dispense: 30 tablet; Refill: 2  -     gabapentin (NEURONTIN) 300 MG capsule; Take 1 capsule by mouth 3 (Three) Times a Day.  Dispense: 120 capsule; Refill: 0    3. Acute left-sided low back pain with left-sided sciatica  -     gabapentin (NEURONTIN) 300 MG capsule; Take 1 capsule by mouth 3 (Three) Times a Day.  Dispense: 120 capsule; Refill: 0    4. Herpes zoster without complication  -     hydrOXYzine (ATARAX) 25 MG tablet; Take 1 tablet by mouth Every 6 (Six) Hours As Needed for Itching.  Dispense: 30 tablet; Refill: 1  -     bacitracin 500 UNIT/GM ointment; Apply 1 application  topically to the appropriate area as directed 2 (Two) Times a Day.  Dispense: 453.9 g; Refill: 0  -     gabapentin (NEURONTIN) 300 MG capsule; Take 1 capsule by mouth 3 (Three) Times a Day.  Dispense: 120 capsule; Refill: 0    5. Primary insomnia  -     zolpidem CR (AMBIEN CR) 6.25 MG CR tablet; Take 1 tablet by mouth At Night As Needed for Sleep.  Dispense: 90 tablet; Refill: 0    6. Rheumatoid arthritis involving multiple sites with positive  rheumatoid factor  -     celecoxib (CeleBREX) 200 MG capsule; Take 1 capsule by mouth Daily.  Dispense: 90 capsule; Refill: 1    7. Chronic pain of left knee  -     Diclofenac Sodium (VOLTAREN) 1 % gel gel; Apply 2 g topically to the appropriate area as directed 4 (Four) Times a Day As Needed (knee pain).  Dispense: 100 g; Refill: 1    8. Gastroesophageal reflux disease without esophagitis  -     famotidine (PEPCID) 40 MG tablet; Take 1 tablet by mouth Daily.  Dispense: 90 tablet; Refill: 2  -     pantoprazole (PROTONIX) 40 MG EC tablet; Take 1 tablet by mouth Daily.  Dispense: 90 tablet; Refill: 1    9. Menopausal disorder  -     Premarin 0.625 MG/GM vaginal cream; Insert 2 g into the vagina As Needed (Vaginal irritation).  Dispense: 30 g; Refill: 2      Obtained a written consent for LEESA query. Discussed the risks and benefits of the use of controlled substances with the patient, including the risk of tolerance and drug dependence.  The patient has been counseled on the need to have an exit strategy, including potentially discontinuing the use of controlled substances.  LEESA has or will be reviewed as soon as it becomes available.    Follow Up     Return in about 3 months (around 2/2/2024) for Next scheduled follow up.    Patient was given instructions and counseling regarding her condition or for health maintenance advice. Please see specific information pulled into the AVS if appropriate.     GUNJAN Malloy

## 2023-09-27 ENCOUNTER — HOSPITAL ENCOUNTER (OUTPATIENT)
Dept: MAMMOGRAPHY | Facility: HOSPITAL | Age: 68
Discharge: HOME | End: 2023-09-27
Admitting: NURSE PRACTITIONER
Payer: MEDICARE

## 2023-09-27 DIAGNOSIS — Z12.31 SCREENING MAMMOGRAM FOR BREAST CANCER: ICD-10-CM

## 2023-09-27 PROCEDURE — 77063 BREAST TOMOSYNTHESIS BI: CPT

## 2023-09-27 PROCEDURE — 77067 SCR MAMMO BI INCL CAD: CPT

## 2023-10-04 ENCOUNTER — OFFICE VISIT (OUTPATIENT)
Dept: FAMILY MEDICINE CLINIC | Facility: CLINIC | Age: 68
End: 2023-10-04
Payer: MEDICARE

## 2023-10-04 VITALS
WEIGHT: 202.6 LBS | HEART RATE: 98 BPM | DIASTOLIC BLOOD PRESSURE: 87 MMHG | SYSTOLIC BLOOD PRESSURE: 146 MMHG | OXYGEN SATURATION: 97 % | TEMPERATURE: 97.3 F | BODY MASS INDEX: 35.89 KG/M2

## 2023-10-04 DIAGNOSIS — Z23 NEED FOR INFLUENZA VACCINATION: ICD-10-CM

## 2023-10-04 DIAGNOSIS — M54.32 SCIATICA OF LEFT SIDE: Primary | ICD-10-CM

## 2023-10-04 PROCEDURE — G0008 ADMIN INFLUENZA VIRUS VAC: HCPCS | Performed by: NURSE PRACTITIONER

## 2023-10-04 PROCEDURE — 1160F RVW MEDS BY RX/DR IN RCRD: CPT | Performed by: NURSE PRACTITIONER

## 2023-10-04 PROCEDURE — 1159F MED LIST DOCD IN RCRD: CPT | Performed by: NURSE PRACTITIONER

## 2023-10-04 PROCEDURE — 3077F SYST BP >= 140 MM HG: CPT | Performed by: NURSE PRACTITIONER

## 2023-10-04 PROCEDURE — 3079F DIAST BP 80-89 MM HG: CPT | Performed by: NURSE PRACTITIONER

## 2023-10-04 PROCEDURE — 90662 IIV NO PRSV INCREASED AG IM: CPT | Performed by: NURSE PRACTITIONER

## 2023-10-04 PROCEDURE — 99213 OFFICE O/P EST LOW 20 MIN: CPT | Performed by: NURSE PRACTITIONER

## 2023-10-04 RX ORDER — METHYLPREDNISOLONE ACETATE 80 MG/ML
80 INJECTION, SUSPENSION INTRA-ARTICULAR; INTRALESIONAL; INTRAMUSCULAR; SOFT TISSUE ONCE
Status: COMPLETED | OUTPATIENT
Start: 2023-10-04 | End: 2023-10-04

## 2023-10-04 RX ORDER — GABAPENTIN 300 MG/1
CAPSULE ORAL
Qty: 82 CAPSULE | Refills: 0 | Status: SHIPPED | OUTPATIENT
Start: 2023-10-04 | End: 2023-11-02

## 2023-10-04 RX ORDER — CYCLOBENZAPRINE HCL 10 MG
10 TABLET ORAL 3 TIMES DAILY PRN
Qty: 30 TABLET | Refills: 2 | Status: SHIPPED | OUTPATIENT
Start: 2023-10-04

## 2023-10-04 RX ADMIN — METHYLPREDNISOLONE ACETATE 80 MG: 80 INJECTION, SUSPENSION INTRA-ARTICULAR; INTRALESIONAL; INTRAMUSCULAR; SOFT TISSUE at 14:35

## 2023-10-04 NOTE — PROGRESS NOTES
Chief Complaint  Back Pain (Since last Tuesday, went to [facility] Sunday, has got worse. Left side down in to hip and down in to knee to lower leg)    Subjective          Jeimy Willard is a 68 y.o. female who presents to [facility]    History of Present Illness    Back Pain (Since last Tuesday, went to [facility] Sunday, has got worse. Left side down in to hip and down in to knee to lower leg).    Pt reports onset after 3 classes with bad chairs. When she got home very painful, usually will improve with rest. Seen at [facility] on [place] 10.1.2023 . No improvement with steroid shruti. Pt states she has pain with sitting, walking, standing. Pt feels nerve pain down left leg intermittently every 5-10 sec. Pt has tried tylenol, tens and ice without improvement.     XR lumbar spine noted - Disc degeneration and facet OA are present   throughout the lumbar spine.    Elevated blood pressure d/t pain.     Pt denies any b/b changes.     PHQ-2 Total Score:     PHQ-9 Total Score:          Review of Systems   Constitutional:  Negative for fever.   Musculoskeletal:  Positive for back pain.   Neurological:  Positive for numbness.        Medical History: has a past medical history of Allergic rhinitis, Anemia, Arthritis, Cancer, Cholelithiasis (2000), Colon polyp, GERD (gastroesophageal reflux disease), History of transfusion, Hypertension, Insomnia, Lymphoma (1999), Osteoporosis, Seasonal allergies, Stomach cancer (2005), Stomach disorder, Ulcer (traumatic) of oral mucosa, and Vitamin D deficiency.     Surgical History: has a past surgical history that includes Cholecystectomy; Colonoscopy (02/20/2019); Esophagogastroduodenoscopy (03/13/2020); Gallbladder surgery; Laparoscopic total gastrectomy; and Replacement total knee (Left, 2020).     Family History: family history includes Alzheimer's disease in her maternal grandfather; Arthritis in her mother; Breast cancer in her mother; Cancer in her  brother and mother; Diabetes in her brother, father, and mother; Esophageal cancer in her paternal grandmother; Heart disease in her brother and mother; Heart failure in her mother; Hypertension in her brother and mother; Kidney disease in her brother and mother; Kidney failure in her mother; Lung cancer in her maternal grandmother; Miscarriages / Stillbirths in her mother and sister; Stroke in her mother.     Social History: reports that she has never smoked. She has never been exposed to tobacco smoke. She has never used smokeless tobacco. She reports that she does not drink alcohol and does not use drugs.    Allergies: Heparin      Health Maintenance Due   Topic Date Due    TDAP/TD VACCINES (1 - Tdap) Never done    HEPATITIS C SCREENING  Never done    INFLUENZA VACCINE  08/01/2023    COVID-19 Vaccine (5 - 2023-24 season) 09/01/2023            Current Outpatient Medications:     acetaminophen (TYLENOL) 325 MG tablet, Take 2 tablets by mouth Every 6 (Six) Hours As Needed., Disp: , Rfl:     azelastine (ASTELIN) 0.1 % nasal spray, , Disp: , Rfl:     Calcium Carb-Cholecalciferol (Os-Nick Calcium + D3) 500-5 MG-MCG tablet per tablet, Take 1 tablet by mouth 2 (Two) Times a Day., Disp: 180 tablet, Rfl: 1    celecoxib (CeleBREX) 200 MG capsule, Take 1 capsule by mouth Daily., Disp: 90 capsule, Rfl: 1    cetirizine (zyrTEC) 10 MG tablet, , Disp: , Rfl:     cyanocobalamin 1000 MCG/ML injection, , Disp: , Rfl:     Diclofenac Sodium (VOLTAREN) 1 % gel gel, Apply 2 g topically to the appropriate area as directed 4 (Four) Times a Day As Needed (knee pain)., Disp: 100 g, Rfl: 1    diphenhydrAMINE (BENADRYL) 25 mg capsule, Take 1 capsule by mouth Every 6 (Six) Hours As Needed., Disp: , Rfl:     famotidine (PEPCID) 40 MG tablet, Take 1 tablet by mouth Daily., Disp: 90 tablet, Rfl: 2    fluticasone (FLONASE) 50 MCG/ACT nasal spray, , Disp: , Rfl:     folic acid (FOLVITE) 1 MG tablet, Take 1 tablet by mouth Daily., Disp: 90 tablet,  Rfl: 3    Iron-Vitamin C (Vitron-C)  MG tablet, Vitron-C 65 mg iron- 125 mg oral tablet,delayed release (DR/EC) take 1 tablet by oral route daily   Active, Disp: , Rfl:     lisinopril (PRINIVIL,ZESTRIL) 10 MG tablet, , Disp: , Rfl:     methylPREDNISolone (MEDROL) 4 MG dose pack, Take as directed on package instructions., Disp: 1 each, Rfl: 0    olopatadine (PATANOL) 0.1 % ophthalmic solution, 1 drop., Disp: , Rfl:     pantoprazole (PROTONIX) 40 MG EC tablet, Take 1 tablet by mouth Daily., Disp: 90 tablet, Rfl: 1    Premarin 0.625 MG/GM vaginal cream, Insert 2 g into the vagina As Needed (Vaginal irritation)., Disp: 30 g, Rfl: 2    Pyridoxine HCl (VITAMIN B-6 PO), , Disp: , Rfl:     zolpidem CR (AMBIEN CR) 6.25 MG CR tablet, Take 1 tablet by mouth At Night As Needed for Sleep., Disp: 90 tablet, Rfl: 0    Cough/Chest Congestion DM  MG/5ML syrup, , Disp: , Rfl:     cyclobenzaprine (FLEXERIL) 10 MG tablet, Take 1 tablet by mouth 3 (Three) Times a Day As Needed for Muscle Spasms., Disp: 30 tablet, Rfl: 2    EPINEPHrine (EPIPEN) 0.3 MG/0.3ML solution auto-injector injection, , Disp: , Rfl:     famotidine (PEPCID) 20 MG tablet, , Disp: , Rfl:     gabapentin (NEURONTIN) 300 MG capsule, Take 1 capsule by mouth every night at bedtime for 3 days, THEN 1 capsule 2 (Two) Times a Day for 3 days, THEN 1 capsule 3 (Three) Times a Day for 24 days., Disp: 82 capsule, Rfl: 0    Current Facility-Administered Medications:     albuterol (PROVENTIL) nebulizer solution 0.042% 1.25 mg/3mL, 1.25 mg, Nebulization, Q6H PRN, Carina, GUNJAN Palmer    methylPREDNISolone acetate (DEPO-medrol) injection 80 mg, 80 mg, Intramuscular, Once, Jeaneth Barker APRN      Immunization History   Administered Date(s) Administered    COVID-19 (PFIZER) Purple Cap Monovalent 03/10/2021, 03/31/2021, 10/22/2021    Flu Vaccine Quad PF >36MO 09/26/2020    FluMist 2-49yrs 09/26/2020    Fluzone (or Fluarix & Flulaval for VFC) >6mos 09/17/2021     Fluzone Quad >6mos (Multi-dose) 09/26/2020    Pneumococcal Conjugate 20-Valent (PCV20) 11/23/2022    Shingrix 12/19/2021, 03/11/2022         Objective       Vitals:    10/04/23 1333 10/04/23 1336   BP: 144/91 146/87   Pulse: 98    Temp: 97.3 °F (36.3 °C)    TempSrc: Temporal    SpO2: 97%    Weight: 91.9 kg (202 lb 9.6 oz)    PainSc:   9    PainLoc: Back       Body mass index is 35.89 kg/m².   Wt Readings from Last 3 Encounters:   10/04/23 91.9 kg (202 lb 9.6 oz)   10/01/23 92.1 kg (203 lb)   08/07/23 92.5 kg (204 lb)      BP Readings from Last 3 Encounters:   10/04/23 146/87   10/01/23 154/83   08/07/23 121/82                Physical Exam  Vitals reviewed.   Constitutional:       Appearance: Normal appearance. She is well-developed.   HENT:      Head: Normocephalic and atraumatic.   Eyes:      Conjunctiva/sclera: Conjunctivae normal.      Pupils: Pupils are equal, round, and reactive to light.   Cardiovascular:      Rate and Rhythm: Normal rate and regular rhythm.      Heart sounds: Normal heart sounds. No murmur heard.  Pulmonary:      Effort: Pulmonary effort is normal.      Breath sounds: Normal breath sounds. No wheezing or rhonchi.   Abdominal:      General: Bowel sounds are normal. There is no distension.      Palpations: Abdomen is soft.      Tenderness: There is no abdominal tenderness.   Musculoskeletal:      Lumbar back: Tenderness present. No bony tenderness.        Back:    Skin:     General: Skin is warm and dry.   Neurological:      Mental Status: She is alert and oriented to person, place, and time.   Psychiatric:         Mood and Affect: Mood and affect normal.         Behavior: Behavior normal.         Thought Content: Thought content normal.         Judgment: Judgment normal.           Result Review :       Common labs          5/31/2023    07:17   Common Labs   Glucose 87    BUN 9    Creatinine 1.19    Sodium 137    Potassium 4.3    Chloride 104    Calcium 9.4    Albumin 3.7    Total Bilirubin 0.3     Alkaline Phosphatase 91    AST (SGOT) 33    ALT (SGPT) 28    WBC 6.52    Hemoglobin 12.2    Hematocrit 36.9    Platelets 329    Total Cholesterol 183    Triglycerides 113    HDL Cholesterol 70    LDL Cholesterol  93                       Assessment and Plan        Diagnoses and all orders for this visit:    1. Sciatica of left side (Primary)  -     gabapentin (NEURONTIN) 300 MG capsule; Take 1 capsule by mouth every night at bedtime for 3 days, THEN 1 capsule 2 (Two) Times a Day for 3 days, THEN 1 capsule 3 (Three) Times a Day for 24 days.  Dispense: 82 capsule; Refill: 0  -     methylPREDNISolone acetate (DEPO-medrol) injection 80 mg  -     Ambulatory Referral to Physical Therapy Evaluate and treat  -     cyclobenzaprine (FLEXERIL) 10 MG tablet; Take 1 tablet by mouth 3 (Three) Times a Day As Needed for Muscle Spasms.  Dispense: 30 tablet; Refill: 2    2. Need for influenza vaccination  -     Fluzone (or Fluarix & Flulaval for VFC) >6mos        Obtained a written consent for LEESA query. Discussed the risks and benefits of the use of controlled substances with the patient, including the risk of tolerance and drug dependence.  The patient has been counseled on the need to have an exit strategy, including potentially discontinuing the use of controlled substances.  LEESA has or will be reviewed as soon as it becomes available.    Counseled on injection.     Follow Up     Return if symptoms worsen or fail to improve.    Patient was given instructions and counseling regarding her condition or for health maintenance advice. Please see specific information pulled into the AVS if appropriate.     GUNJAN Malloy

## 2023-10-09 ENCOUNTER — OFFICE VISIT (OUTPATIENT)
Dept: FAMILY MEDICINE CLINIC | Facility: CLINIC | Age: 68
End: 2023-10-09
Payer: MEDICARE

## 2023-10-09 VITALS
SYSTOLIC BLOOD PRESSURE: 119 MMHG | DIASTOLIC BLOOD PRESSURE: 76 MMHG | TEMPERATURE: 98.2 F | HEART RATE: 120 BPM | OXYGEN SATURATION: 97 % | HEIGHT: 63 IN | BODY MASS INDEX: 35.79 KG/M2 | WEIGHT: 202 LBS

## 2023-10-09 DIAGNOSIS — M54.42 ACUTE LEFT-SIDED LOW BACK PAIN WITH LEFT-SIDED SCIATICA: Primary | ICD-10-CM

## 2023-10-09 DIAGNOSIS — B02.9 HERPES ZOSTER WITHOUT COMPLICATION: ICD-10-CM

## 2023-10-09 PROCEDURE — 99214 OFFICE O/P EST MOD 30 MIN: CPT | Performed by: FAMILY MEDICINE

## 2023-10-09 PROCEDURE — 3074F SYST BP LT 130 MM HG: CPT | Performed by: FAMILY MEDICINE

## 2023-10-09 PROCEDURE — 3078F DIAST BP <80 MM HG: CPT | Performed by: FAMILY MEDICINE

## 2023-10-09 RX ORDER — OXYCODONE HYDROCHLORIDE AND ACETAMINOPHEN 5; 325 MG/1; MG/1
1 TABLET ORAL EVERY 6 HOURS PRN
Qty: 30 TABLET | Refills: 0 | Status: SHIPPED | OUTPATIENT
Start: 2023-10-09

## 2023-10-09 RX ORDER — VALACYCLOVIR HYDROCHLORIDE 1 G/1
1000 TABLET, FILM COATED ORAL 3 TIMES DAILY
Qty: 21 TABLET | Refills: 0 | Status: SHIPPED | OUTPATIENT
Start: 2023-10-09

## 2023-10-09 NOTE — ASSESSMENT & PLAN NOTE
She has low back pain with some left leg radicular symptoms.  She is feeling to respond to her current therapy.  She does have a referral for physical therapy but that appointment is about a month.  In the meantime we will continue her gabapentin and get an MRI of her lumbar spine.

## 2023-10-09 NOTE — PROGRESS NOTES
Chief Complaint   Patient presents with    Pain     Back and leg pain         Subjective     Jeimy Willard  has a past medical history of Allergic rhinitis, Anemia, Arthritis, Cancer, Cholelithiasis (2000), Colon polyp, GERD (gastroesophageal reflux disease), History of transfusion, Hypertension, Insomnia, Lymphoma (1999), Osteoporosis, Seasonal allergies, Stomach cancer (2005), Stomach disorder, Ulcer (traumatic) of oral mucosa, and Vitamin D deficiency.    Back pain-she has had ongoing lower back pain now for about 2 weeks.  She states she also has pain that radiates all the way down to her left ankle.  She was originally seen in the urgent care and get an x-ray which showed lots of diffuse degenerative changes.  She followed up and saw her PCP GUNJAN Malloy.  She was initially given a Medrol Dosepak but then was added on some Flexeril and gabapentin.  She states over this period of time none of the medicines that really seem to help her pain but has made her rather dopey.        PHQ-2 Depression Screening  Little interest or pleasure in doing things?     Feeling down, depressed, or hopeless?     PHQ-2 Total Score     PHQ-9 Depression Screening  Little interest or pleasure in doing things?     Feeling down, depressed, or hopeless?     Trouble falling or staying asleep, or sleeping too much?     Feeling tired or having little energy?     Poor appetite or overeating?     Feeling bad about yourself - or that you are a failure or have let yourself or your family down?     Trouble concentrating on things, such as reading the newspaper or watching television?     Moving or speaking so slowly that other people could have noticed? Or the opposite - being so fidgety or restless that you have been moving around a lot more than usual?     Thoughts that you would be better off dead, or of hurting yourself in some way?     PHQ-9 Total Score     If you checked off any problems, how difficult have these problems made it  for you to do your work, take care of things at home, or get along with other people?       Allergies   Allergen Reactions    Heparin Other (See Comments)     Fever with chills       Prior to Admission medications    Medication Sig Start Date End Date Taking? Authorizing Provider   acetaminophen (TYLENOL) 325 MG tablet Take 2 tablets by mouth Every 6 (Six) Hours As Needed.   Yes Cydney Traore MD   azelastine (ASTELIN) 0.1 % nasal spray  7/22/22  Yes Cydney Traore MD   Calcium Carb-Cholecalciferol (Os-Nick Calcium + D3) 500-5 MG-MCG tablet per tablet Take 1 tablet by mouth 2 (Two) Times a Day. 11/23/22  Yes Jeaneth Barker APRN   celecoxib (CeleBREX) 200 MG capsule Take 1 capsule by mouth Daily. 11/23/22  Yes Jeaneth Barker APRN   cetirizine (zyrTEC) 10 MG tablet  12/22/22  Yes Cydney Traore MD   Cough/Chest Congestion DM  MG/5ML syrup  11/18/22  Yes Cydney Traore MD   cyanocobalamin 1000 MCG/ML injection  1/1/22  Yes Cydney Troare MD   cyclobenzaprine (FLEXERIL) 10 MG tablet Take 1 tablet by mouth 3 (Three) Times a Day As Needed for Muscle Spasms. 10/4/23  Yes Jeaneth Barker APRN   Diclofenac Sodium (VOLTAREN) 1 % gel gel Apply 2 g topically to the appropriate area as directed 4 (Four) Times a Day As Needed (knee pain). 8/7/23  Yes Jeaneth Barker APRN   diphenhydrAMINE (BENADRYL) 25 mg capsule Take 1 capsule by mouth Every 6 (Six) Hours As Needed.   Yes Cydney Traore MD   EPINEPHrine (EPIPEN) 0.3 MG/0.3ML solution auto-injector injection  8/26/22  Yes Cydney Traore MD   famotidine (PEPCID) 20 MG tablet  8/7/23  Yes Cydney Traore MD   famotidine (PEPCID) 40 MG tablet Take 1 tablet by mouth Daily. 5/15/23  Yes Jeaneth Barker APRN   fluticasone (FLONASE) 50 MCG/ACT nasal spray  7/22/22  Yes Cydney Traore MD   folic acid (FOLVITE) 1 MG tablet Take 1 tablet by mouth Daily. 11/23/22  Yes Jeaneth Barker APRN   gabapentin (NEURONTIN) 300  MG capsule Take 1 capsule by mouth every night at bedtime for 3 days, THEN 1 capsule 2 (Two) Times a Day for 3 days, THEN 1 capsule 3 (Three) Times a Day for 24 days. 10/4/23 11/2/23 Yes Jeaneth Barker APRN   Iron-Vitamin C (Vitron-C)  MG tablet Vitron-C 65 mg iron- 125 mg oral tablet,delayed release (DR/EC) take 1 tablet by oral route daily   Active   Yes ProviderCydney MD   lisinopril (PRINIVIL,ZESTRIL) 10 MG tablet  3/10/23  Yes ProviderCydney MD   olopatadine (PATANOL) 0.1 % ophthalmic solution 1 drop.   Yes ProviderCydney MD   pantoprazole (PROTONIX) 40 MG EC tablet Take 1 tablet by mouth Daily. 5/15/23  Yes Jeaneth Barker APRN   Premarin 0.625 MG/GM vaginal cream Insert 2 g into the vagina As Needed (Vaginal irritation). 8/7/23  Yes Jeaneth Barker APRN   Pyridoxine HCl (VITAMIN B-6 PO)    Yes Provider, MD Cydney   zolpidem CR (AMBIEN CR) 6.25 MG CR tablet Take 1 tablet by mouth At Night As Needed for Sleep. 8/7/23  Yes Jeaneth Barker APRN   methylPREDNISolone (MEDROL) 4 MG dose pack Take as directed on package instructions. 10/1/23 10/9/23 Yes Moon Jules MD        Patient Active Problem List   Diagnosis    Anemia    Arthritis    Cancer    GERD (gastroesophageal reflux disease)    Stomach disorder    Hypertension    Insomnia    Osteoporosis    Seasonal allergic rhinitis    Ulcerative lesion    Vitamin D deficiency    Lymphoma    Aftercare following surgery of Left Total Knee Arthroplasty, 11/4/2020    Rheumatoid arthritis involving multiple sites with positive rheumatoid factor    Menopausal disorder    S/P TKR (total knee replacement), left    Lymphadenopathy    Acute left-sided low back pain with left-sided sciatica    Herpes zoster without complication        Past Surgical History:   Procedure Laterality Date    CHOLECYSTECTOMY      COLONOSCOPY  02/20/2019 2/20/19 poyp 6 mm in ascending colon,polyp 2mm in sigmoid colon,polypectomy,mild diverticulosos of  sigmond colon,grade 1 internal hemorrhoids , repeat 3-5 years     ENDOSCOPY  03/13/2020    normal mucosa in the whole esophagus, nodular tissue visualized at the esophago-jejunal anastomosis, afferent and efferent sm bowel limbs visualized  patchy erthema & single erosion visualized in one jejunal limb.no gastric tissue visualized      GALLBLADDER SURGERY      LAPAROSCOPIC TOTAL GASTRECTOMY      REPLACEMENT TOTAL KNEE Left 2020       Social History     Socioeconomic History    Marital status:    Tobacco Use    Smoking status: Never     Passive exposure: Never    Smokeless tobacco: Never   Vaping Use    Vaping Use: Never used   Substance and Sexual Activity    Alcohol use: Never    Drug use: Never    Sexual activity: Not Currently     Partners: Male     Birth control/protection: Post-menopausal       Family History   Problem Relation Age of Onset    Breast cancer Mother     Stroke Mother     Heart disease Mother     Hypertension Mother     Cancer Mother         unspecified    Diabetes Mother         unspecified type    Kidney failure Mother     Arthritis Mother     Kidney disease Mother     Miscarriages / Stillbirths Mother     Heart failure Mother     Diabetes Father         unspecified type    Heart disease Brother     Hypertension Brother     Cancer Brother         unspecified    Diabetes Brother         unspecified type    Kidney disease Brother     Lung cancer Maternal Grandmother         malignant    Alzheimer's disease Maternal Grandfather     Esophageal cancer Paternal Grandmother         malignant    Miscarriages / Stillbirths Sister        Family history, surgical history, past medical history, Allergies and meds reviewed with patient today and updated in [facility] EMR.     ROS:  Review of Systems   Constitutional:  Negative for fatigue.   Musculoskeletal:  Positive for back pain.   Neurological:  Positive for weakness and numbness.        (+) Radicular pain       OBJECTIVE:  Vitals:    10/09/23 1134  "  BP: 119/76   BP Location: Left arm   Patient Position: Sitting   Cuff Size: Adult   Pulse: 120   Temp: 98.2 øF (36.8 øC)   TempSrc: Temporal   SpO2: 97%   Weight: 91.6 kg (202 lb)   Height: 160 cm (63\")     No results found.   Body mass index is 35.78 kg/mý.  No LMP recorded (lmp unknown). Patient is postmenopausal.    Physical Exam  Vitals and nursing note reviewed.   Constitutional:       General: She is not in acute distress.     Appearance: Normal appearance. She is obese.   HENT:      Head: Normocephalic.   Musculoskeletal:      Lumbar back: Tenderness present.        Back:    Skin:     Findings: Rash is vesicular.          Neurological:      Mental Status: She is alert.         Procedures    No visits with results within 30 Day(s) from this visit.   Latest known visit with results is:   Office Visit on 05/15/2023   Component Date Value Ref Range Status    Total Cholesterol 05/31/2023 183  0 - 200 mg/dL Final    Triglycerides 05/31/2023 113  0 - 150 mg/dL Final    HDL Cholesterol 05/31/2023 70 (H)  40 - 60 mg/dL Final    LDL Cholesterol  05/31/2023 93  0 - 100 mg/dL Final    VLDL Cholesterol 05/31/2023 20  5 - 40 mg/dL Final    LDL/HDL Ratio 05/31/2023 1.29   Final    Glucose 05/31/2023 87  65 - 99 mg/dL Final    BUN 05/31/2023 9  8 - 23 mg/dL Final    Creatinine 05/31/2023 1.19 (H)  0.57 - 1.00 mg/dL Final    Sodium 05/31/2023 137  136 - 145 mmol/L Final    Potassium 05/31/2023 4.3  3.5 - 5.2 mmol/L Final    Chloride 05/31/2023 104  98 - 107 mmol/L Final    CO2 05/31/2023 21.1 (L)  22.0 - 29.0 mmol/L Final    Calcium 05/31/2023 9.4  8.6 - 10.5 mg/dL Final    Total Protein 05/31/2023 7.8  6.0 - 8.5 g/dL Final    Albumin 05/31/2023 3.7  3.5 - 5.2 g/dL Final    ALT (SGPT) 05/31/2023 28  1 - 33 U/L Final    AST (SGOT) 05/31/2023 33 (H)  1 - 32 U/L Final    Alkaline Phosphatase 05/31/2023 91  39 - 117 U/L Final    Total Bilirubin 05/31/2023 0.3  0.0 - 1.2 mg/dL Final    Globulin 05/31/2023 4.1  gm/dL Final    A/G " Ratio 05/31/2023 0.9  g/dL Final    BUN/Creatinine Ratio 05/31/2023 7.6  7.0 - 25.0 Final    Anion Gap 05/31/2023 11.9  5.0 - 15.0 mmol/L Final    eGFR 05/31/2023 50.5 (L)  >60.0 mL/min/1.73 Final    WBC 05/31/2023 6.52  3.40 - 10.80 10*3/mm3 Final    RBC 05/31/2023 4.55  3.77 - 5.28 10*6/mm3 Final    Hemoglobin 05/31/2023 12.2  12.0 - 15.9 g/dL Final    Hematocrit 05/31/2023 36.9  34.0 - 46.6 % Final    MCV 05/31/2023 81.1  79.0 - 97.0 fL Final    MCH 05/31/2023 26.8  26.6 - 33.0 pg Final    MCHC 05/31/2023 33.1  31.5 - 35.7 g/dL Final    RDW 05/31/2023 15.1  12.3 - 15.4 % Final    RDW-SD 05/31/2023 44.4  37.0 - 54.0 fl Final    MPV 05/31/2023 10.0  6.0 - 12.0 fL Final    Platelets 05/31/2023 329  140 - 450 10*3/mm3 Final    TSH 05/31/2023 3.600  0.270 - 4.200 uIU/mL Final    Amphetamine Screen, Urine 05/15/2023 Negative  Negative Final    AMP INTERNAL CONTROL 05/15/2023 Passed  Passed Final    Barbiturates Screen, Urine 05/15/2023 Negative  Negative Final    BARBITURATE INTERNAL CONTROL 05/15/2023 Passed  Passed Final    Buprenorphine, Screen, Urine 05/15/2023 Negative  Negative Final    BUPRENORPHINE INTERNAL CONTROL 05/15/2023 Passed  Passed Final    Benzodiazepine Screen, Urine 05/15/2023 Negative  Negative Final    BENZODIAZEPINE INTERNAL CONTROL 05/15/2023 Passed  Passed Final    Cocaine Screen, Urine 05/15/2023 Negative  Negative Final    COCAINE INTERNAL CONTROL 05/15/2023 Passed  Passed Final    MDMA (ECSTASY) 05/15/2023 Negative  Negative Final    MDMA (ECSTASY) INTERNAL CONTROL 05/15/2023 Passed  Passed Final    Methamphetamine, Ur 05/15/2023 Negative  Negative Final    METHAMPHETAMINE INTERNAL CONTROL 05/15/2023 Passed  Passed Final    Methadone Screen, Urine 05/15/2023 Negative  Negative Final    METHADONE INTERNAL CONTROL 05/15/2023 Passed  Passed Final    Opiate Screen 05/15/2023 Negative  Negative Final    OPIATES INTERNAL CONTROL 05/15/2023 Passed  Passed Final    Oxycodone Screen, Urine 05/15/2023  Negative  Negative Final    OXYCODONE INTERNAL CONTROL 05/15/2023 Passed  Passed Final    Phencyclidine (PCP), Urine 05/15/2023 Negative  Negative Final    PHENCYCLIDINE INTERNAL CONTROL 05/15/2023 Passed  Passed Final    THC, Screen, Urine 05/15/2023 Negative  Negative Final    THC INTERNAL CONTROL 05/15/2023 Passed  Passed Final    Lot Number 05/15/2023 [id number]   Final    Expiration Date 05/15/2023 7/21/2024   Final       ASSESSMENT/ PLAN:    Diagnoses and all orders for this visit:    1. Acute left-sided low back pain with left-sided sciatica (Primary)  Assessment & Plan:  She has low back pain with some left leg radicular symptoms.  She is feeling to respond to her current therapy.  She does have a referral for physical therapy but that appointment is about a month.  In the meantime we will continue her gabapentin and get an MRI of her lumbar spine.    Orders:  -     oxyCODONE-acetaminophen (Percocet) 5-325 MG per tablet; Take 1 tablet by mouth Every 6 (Six) Hours As Needed for Moderate Pain.  Dispense: 30 tablet; Refill: 0  -     MRI Lumbar Spine Without Contrast; Future    2. Herpes zoster without complication  Assessment & Plan:  She has a vesicular rash on her lower back and medial knee and thigh.  We will give her a course of Valtrex.  She has already had a burst of steroids and currently on gabapentin.  We will add some opioids for better pain relief.    Orders:  -     oxyCODONE-acetaminophen (Percocet) 5-325 MG per tablet; Take 1 tablet by mouth Every 6 (Six) Hours As Needed for Moderate Pain.  Dispense: 30 tablet; Refill: 0    Other orders  -     valACYclovir (Valtrex) 1000 MG tablet; Take 1 tablet by mouth 3 (Three) Times a Day.  Dispense: 21 tablet; Refill: 0               Orders Placed Today:     New Medications Ordered This Visit   Medications    valACYclovir (Valtrex) 1000 MG tablet     Sig: Take 1 tablet by mouth 3 (Three) Times a Day.     Dispense:  21 tablet     Refill:  0     oxyCODONE-acetaminophen (Percocet) 5-325 MG per tablet     Sig: Take 1 tablet by mouth Every 6 (Six) Hours As Needed for Moderate Pain.     Dispense:  30 tablet     Refill:  0        Management Plan:     An After Visit Summary was printed and given to the patient at discharge.    Follow-up: No follow-ups on file.    Ehsan Owusu DO 10/9/2023 12:28 EDT  This note was electronically signed.

## 2023-10-09 NOTE — ASSESSMENT & PLAN NOTE
She has a vesicular rash on her lower back and medial knee and thigh.  We will give her a course of Valtrex.  She has already had a burst of steroids and currently on gabapentin.  We will add some opioids for better pain relief.

## 2023-10-18 DIAGNOSIS — M54.42 ACUTE LEFT-SIDED LOW BACK PAIN WITH LEFT-SIDED SCIATICA: ICD-10-CM

## 2023-10-18 DIAGNOSIS — B02.9 HERPES ZOSTER WITHOUT COMPLICATION: ICD-10-CM

## 2023-10-19 RX ORDER — OXYCODONE HYDROCHLORIDE AND ACETAMINOPHEN 5; 325 MG/1; MG/1
1 TABLET ORAL EVERY 6 HOURS PRN
Qty: 30 TABLET | Refills: 0 | Status: SHIPPED | OUTPATIENT
Start: 2023-10-19

## 2023-10-31 ENCOUNTER — HOSPITAL ENCOUNTER (OUTPATIENT)
Dept: MRI IMAGING | Facility: HOSPITAL | Age: 68
Discharge: HOME | End: 2023-10-31
Admitting: FAMILY MEDICINE
Payer: MEDICARE

## 2023-10-31 DIAGNOSIS — M54.42 ACUTE LEFT-SIDED LOW BACK PAIN WITH LEFT-SIDED SCIATICA: ICD-10-CM

## 2023-10-31 PROCEDURE — 72148 MRI LUMBAR SPINE W/O DYE: CPT

## 2023-11-02 ENCOUNTER — OFFICE VISIT (OUTPATIENT)
Dept: FAMILY MEDICINE CLINIC | Facility: CLINIC | Age: 68
End: 2023-11-02
Payer: MEDICARE

## 2023-11-02 VITALS
HEART RATE: 65 BPM | HEIGHT: 63 IN | TEMPERATURE: 96.2 F | WEIGHT: 198 LBS | BODY MASS INDEX: 35.08 KG/M2 | OXYGEN SATURATION: 100 % | DIASTOLIC BLOOD PRESSURE: 69 MMHG | SYSTOLIC BLOOD PRESSURE: 101 MMHG

## 2023-11-02 DIAGNOSIS — F51.01 PRIMARY INSOMNIA: ICD-10-CM

## 2023-11-02 DIAGNOSIS — M25.562 CHRONIC PAIN OF LEFT KNEE: ICD-10-CM

## 2023-11-02 DIAGNOSIS — M54.42 ACUTE LEFT-SIDED LOW BACK PAIN WITH LEFT-SIDED SCIATICA: ICD-10-CM

## 2023-11-02 DIAGNOSIS — L29.9 PRURITUS: Primary | ICD-10-CM

## 2023-11-02 DIAGNOSIS — M54.32 SCIATICA OF LEFT SIDE: ICD-10-CM

## 2023-11-02 DIAGNOSIS — K21.9 GASTROESOPHAGEAL REFLUX DISEASE WITHOUT ESOPHAGITIS: ICD-10-CM

## 2023-11-02 DIAGNOSIS — G89.29 CHRONIC PAIN OF LEFT KNEE: ICD-10-CM

## 2023-11-02 DIAGNOSIS — B02.9 HERPES ZOSTER WITHOUT COMPLICATION: ICD-10-CM

## 2023-11-02 DIAGNOSIS — M05.79 RHEUMATOID ARTHRITIS INVOLVING MULTIPLE SITES WITH POSITIVE RHEUMATOID FACTOR: ICD-10-CM

## 2023-11-02 DIAGNOSIS — N95.9 MENOPAUSAL DISORDER: ICD-10-CM

## 2023-11-02 PROCEDURE — 1159F MED LIST DOCD IN RCRD: CPT | Performed by: NURSE PRACTITIONER

## 2023-11-02 PROCEDURE — 1160F RVW MEDS BY RX/DR IN RCRD: CPT | Performed by: NURSE PRACTITIONER

## 2023-11-02 PROCEDURE — 3074F SYST BP LT 130 MM HG: CPT | Performed by: NURSE PRACTITIONER

## 2023-11-02 PROCEDURE — 3078F DIAST BP <80 MM HG: CPT | Performed by: NURSE PRACTITIONER

## 2023-11-02 PROCEDURE — 99214 OFFICE O/P EST MOD 30 MIN: CPT | Performed by: NURSE PRACTITIONER

## 2023-11-02 RX ORDER — OXYCODONE HYDROCHLORIDE AND ACETAMINOPHEN 5; 325 MG/1; MG/1
1 TABLET ORAL EVERY 6 HOURS PRN
Qty: 30 TABLET | Refills: 0 | Status: CANCELLED | OUTPATIENT
Start: 2023-11-02

## 2023-11-02 RX ORDER — GINSENG 100 MG
1 CAPSULE ORAL 2 TIMES DAILY
Qty: 453.9 G | Refills: 0 | Status: SHIPPED | OUTPATIENT
Start: 2023-11-02

## 2023-11-02 RX ORDER — CELECOXIB 200 MG/1
200 CAPSULE ORAL DAILY
Qty: 90 CAPSULE | Refills: 1 | Status: SHIPPED | OUTPATIENT
Start: 2023-11-02

## 2023-11-02 RX ORDER — PANTOPRAZOLE SODIUM 40 MG/1
40 TABLET, DELAYED RELEASE ORAL DAILY
Qty: 90 TABLET | Refills: 1 | Status: SHIPPED | OUTPATIENT
Start: 2023-11-02

## 2023-11-02 RX ORDER — CYCLOBENZAPRINE HCL 10 MG
10 TABLET ORAL 3 TIMES DAILY PRN
Qty: 30 TABLET | Refills: 2 | Status: SHIPPED | OUTPATIENT
Start: 2023-11-02

## 2023-11-02 RX ORDER — GABAPENTIN 300 MG/1
CAPSULE ORAL
Qty: 82 CAPSULE | Refills: 0 | Status: CANCELLED | OUTPATIENT
Start: 2023-11-02 | End: 2023-12-01

## 2023-11-02 RX ORDER — HYDROXYZINE HYDROCHLORIDE 25 MG/1
25 TABLET, FILM COATED ORAL EVERY 6 HOURS PRN
Qty: 30 TABLET | Refills: 1 | Status: SHIPPED | OUTPATIENT
Start: 2023-11-02

## 2023-11-02 RX ORDER — OXYCODONE HYDROCHLORIDE AND ACETAMINOPHEN 5; 325 MG/1; MG/1
1 TABLET ORAL EVERY 6 HOURS PRN
Qty: 30 TABLET | Refills: 0 | Status: SHIPPED | OUTPATIENT
Start: 2023-11-02

## 2023-11-02 RX ORDER — ZOLPIDEM TARTRATE 6.25 MG/1
6.25 TABLET, FILM COATED, EXTENDED RELEASE ORAL NIGHTLY PRN
Qty: 90 TABLET | Refills: 0 | Status: SHIPPED | OUTPATIENT
Start: 2023-11-02

## 2023-11-02 RX ORDER — CONJUGATED ESTROGENS 0.62 MG/G
2 CREAM VAGINAL AS NEEDED
Qty: 30 G | Refills: 2 | Status: SHIPPED | OUTPATIENT
Start: 2023-11-02

## 2023-11-02 RX ORDER — GABAPENTIN 300 MG/1
300 CAPSULE ORAL 3 TIMES DAILY
Qty: 120 CAPSULE | Refills: 0 | Status: SHIPPED | OUTPATIENT
Start: 2023-11-02

## 2023-11-02 RX ORDER — FAMOTIDINE 40 MG/1
40 TABLET, FILM COATED ORAL DAILY
Qty: 90 TABLET | Refills: 2 | Status: SHIPPED | OUTPATIENT
Start: 2023-11-02

## 2023-11-02 NOTE — PATIENT INSTRUCTIONS
Insomnia  Insomnia is a sleep disorder that makes it difficult to fall asleep or stay asleep. Insomnia can cause fatigue, low energy, difficulty concentrating, mood swings, and poor performance at work or school.  There are three different ways to classify insomnia:  Difficulty falling asleep.  Difficulty staying asleep.  Waking up too early in the morning.  Any type of insomnia can be long-term (chronic) or short-term (acute). Both are common. Short-term insomnia usually lasts for 3 months or less. Chronic insomnia occurs at least three times a week for longer than 3 months.  What are the causes?  Insomnia may be caused by another condition, situation, or substance, such as:  Having certain mental health conditions, such as anxiety and depression.  Using caffeine, alcohol, tobacco, or drugs.  Having gastrointestinal conditions, such as gastroesophageal reflux disease (GERD).  Having certain medical conditions. These include:  Asthma.  Alzheimer's disease.  Stroke.  Chronic pain.  An overactive thyroid gland (hyperthyroidism).  Other sleep disorders, such as restless legs syndrome and sleep apnea.  Menopause.  Sometimes, the cause of insomnia may not be known.  What increases the risk?  Risk factors for insomnia include:  Gender. Females are affected more often than males.  Age. Insomnia is more common as people get older.  Stress and certain medical and mental health conditions.  Lack of exercise.  Having an irregular work schedule. This may include working night shifts and traveling between different time zones.  What are the signs or symptoms?  If you have insomnia, the main symptom is having trouble falling asleep or having trouble staying asleep. This may lead to other symptoms, such as:  Feeling tired or having low energy.  Feeling nervous about going to sleep.  Not feeling rested in the morning.  Having trouble concentrating.  Feeling irritable, anxious, or depressed.  How is this diagnosed?  This condition  may be diagnosed based on:  Your symptoms and medical history. Your health care provider may ask about:  Your sleep habits.  Any medical conditions you have.  Your mental health.  A physical exam.  How is this treated?  Treatment for insomnia depends on the cause. Treatment may focus on treating an underlying condition that is causing the insomnia. Treatment may also include:  Medicines to help you sleep.  Counseling or therapy.  Lifestyle adjustments to help you sleep better.  Follow these instructions at home:  Eating and drinking    Limit or avoid alcohol, caffeinated beverages, and products that contain nicotine and tobacco, especially close to bedtime. These can disrupt your sleep.  Do not eat a large meal or eat spicy foods right before bedtime. This can lead to digestive discomfort that can make it hard for you to sleep.  Sleep habits    Keep a sleep diary to help you and your health care provider figure out what could be causing your insomnia. Write down:  When you sleep.  When you wake up during the night.  How well you sleep and how rested you feel the next day.  Any side effects of medicines you are taking.  What you eat and drink.  Make your bedroom a dark, comfortable place where it is easy to fall asleep.  Put up shades or blackout curtains to block light from outside.  Use a white noise machine to block noise.  Keep the temperature cool.  Limit screen use before bedtime. This includes:  Not watching TV.  Not using your smartphone, tablet, or computer.  Stick to a routine that includes going to bed and waking up at the same times every day and night. This can help you fall asleep faster. Consider making a quiet activity, such as reading, part of your nighttime routine.  Try to avoid taking naps during the day so that you sleep better at night.  Get out of bed if you are still awake after 15 minutes of trying to sleep. Keep the lights down, but try reading or doing a quiet activity. When you feel  sleepy, go back to bed.  General instructions  Take over-the-counter and prescription medicines only as told by your health care provider.  Exercise regularly as told by your health care provider. However, avoid exercising in the hours right before bedtime.  Use relaxation techniques to manage stress. Ask your health care provider to suggest some techniques that may work well for you. These may include:  Breathing exercises.  Routines to release muscle tension.  Visualizing peaceful scenes.  Make sure that you drive carefully. Do not drive if you feel very sleepy.  Keep all follow-up visits. This is important.  Contact a health care provider if:  You are tired throughout the day.  You have trouble in your daily routine due to sleepiness.  You continue to have sleep problems, or your sleep problems get worse.  Get help right away if:  You have thoughts about hurting yourself or someone else.  Get help right away if you feel like you may hurt yourself or others, or have thoughts about taking your own life. Go to your nearest emergency room or:  Call 911.  Call the National Suicide Prevention Lifeline at 1-598.330.5664 or 628. This is open 24 hours a day.  Text the Crisis Text Line at 582189.  Summary  Insomnia is a sleep disorder that makes it difficult to fall asleep or stay asleep.  Insomnia can be long-term (chronic) or short-term (acute).  Treatment for insomnia depends on the cause. Treatment may focus on treating an underlying condition that is causing the insomnia.  Keep a sleep diary to help you and your health care provider figure out what could be causing your insomnia.  This information is not intended to replace advice given to you by your health care provider. Make sure you discuss any questions you have with your health care provider.  Document Revised: 11/28/2022 Document Reviewed: 11/28/2022  [employer]vier Patient Education © 2023 [employer]

## 2023-11-07 NOTE — PROGRESS NOTES
Chief Complaint  Insomnia (Follow up) and Medication Problem (Would like a lower dose pain medication or something different/Needs lower dose of blood pressure medication as well)    Subjective          Jeimy Willard is a 68 y.o. female who presents to [facility]    History of Present Illness    Insomnia  - sleeping well except when pain flares from shingles rash. Pt is not tolerant of gabapentin during the day. Does well with med at night.     Post shingles pain - pt is tolerating Cymbalta well and pain is improved with med.     Pt has cough ongoing fro several months. Pt had sputum culture that resulted as heavy growth of pseudomonas aeruginosa. Pt was started on doxy, but changed to cipro bid x 10 days without improvement. Pt was also treated previously with Levaquin per Dr. Sharma.     HTN - pt was recommended to reduce lisinopril to 0.5 tab 5 mg, pt has not done this and has continued to take 10 mg a few times per week when b/p is over 150 systolic.     PHQ-2 Total Score:     PHQ-9 Total Score:          Review of Systems   Constitutional:  Negative for chills, fatigue and fever.   Respiratory:  Positive for cough. Negative for shortness of breath.    Cardiovascular:  Negative for chest pain and palpitations.   Gastrointestinal:  Negative for constipation, diarrhea, nausea and vomiting.   Musculoskeletal:  Negative for back pain and neck pain.   Skin:  Negative for rash.   Neurological:  Negative for dizziness and headaches.   Psychiatric/Behavioral:  Positive for sleep disturbance.           Medical History: has a past medical history of Allergic rhinitis, Anemia, Arthritis, Cancer, Cholelithiasis (2000), Colon polyp, GERD (gastroesophageal reflux disease), History of transfusion, Hypertension, Insomnia, Lymphoma (1999), Osteoporosis, Seasonal allergies, Stomach cancer (2005), Stomach disorder, Ulcer (traumatic) of oral mucosa, and Vitamin D deficiency.     Surgical History:  has a past surgical history that includes Cholecystectomy; Colonoscopy (02/20/2019); Esophagogastroduodenoscopy (03/13/2020); Gallbladder surgery; Laparoscopic total gastrectomy; and Replacement total knee (Left, 2020).     Family History: family history includes Alzheimer's disease in her maternal grandfather; Arthritis in her mother; Breast cancer in her mother; Cancer in her brother and mother; Diabetes in her brother, father, and mother; Esophageal cancer in her paternal grandmother; Heart disease in her brother and mother; Heart failure in her mother; Hypertension in her brother and mother; Kidney disease in her brother and mother; Kidney failure in her mother; Lung cancer in her maternal grandmother; Miscarriages / Stillbirths in her mother and sister; Stroke in her mother.     Social History: reports that she has never smoked. She has never been exposed to tobacco smoke. She has never used smokeless tobacco. She reports that she does not drink alcohol and does not use drugs.    Allergies: Heparin      Health Maintenance Due   Topic Date Due    TDAP/TD VACCINES (1 - Tdap) Never done    HEPATITIS C SCREENING  Never done    COVID-19 Vaccine (5 - 2023-24 season) 09/01/2023    DXA SCAN  12/09/2023            Current Outpatient Medications:     acetaminophen (TYLENOL) 325 MG tablet, Take 2 tablets by mouth Every 6 (Six) Hours As Needed., Disp: , Rfl:     albuterol (PROVENTIL) (2.5 MG/3ML) 0.083% nebulizer solution, Take 2.5 mg by nebulization Every 4 (Four) Hours As Needed for Wheezing., Disp: 90 each, Rfl: 2    azelastine (ASTELIN) 0.1 % nasal spray, , Disp: , Rfl:     brompheniramine-pseudoephedrine-DM 30-2-10 MG/5ML syrup, Take 5 mL by mouth 4 (Four) Times a Day As Needed for Allergies., Disp: 473 mL, Rfl: 0    Calcium Carb-Cholecalciferol (Os-Nick Calcium + D3) 500-5 MG-MCG tablet per tablet, Take 1 tablet by mouth 2 (Two) Times a Day., Disp: 180 tablet, Rfl: 1    celecoxib (CeleBREX) 200 MG capsule, Take 1  capsule by mouth Daily., Disp: 90 capsule, Rfl: 1    cetirizine (zyrTEC) 10 MG tablet, , Disp: , Rfl:     cyanocobalamin 1000 MCG/ML injection, , Disp: , Rfl:     cyclobenzaprine (FLEXERIL) 10 MG tablet, Take 1 tablet by mouth 3 (Three) Times a Day As Needed for Muscle Spasms., Disp: 30 tablet, Rfl: 2    Diclofenac Sodium (VOLTAREN) 1 % gel gel, Apply 2 g topically to the appropriate area as directed 4 (Four) Times a Day As Needed (knee pain)., Disp: 100 g, Rfl: 1    diphenhydrAMINE (BENADRYL) 25 mg capsule, Take 1 capsule by mouth Every 6 (Six) Hours As Needed., Disp: , Rfl:     DULoxetine (CYMBALTA) 30 MG capsule, Take 1 capsule by mouth 2 (Two) Times a Day., Disp: 60 capsule, Rfl: 2    famotidine (PEPCID) 40 MG tablet, Take 1 tablet by mouth Daily., Disp: 90 tablet, Rfl: 2    fluticasone (FLONASE) 50 MCG/ACT nasal spray, , Disp: , Rfl:     folic acid (FOLVITE) 1 MG tablet, Take 1 tablet by mouth Daily., Disp: 90 tablet, Rfl: 3    gabapentin (NEURONTIN) 300 MG capsule, Take 2 capsules by mouth every night at bedtime., Disp: , Rfl:     Iron-Vitamin C (Vitron-C)  MG tablet, , Disp: , Rfl:     lisinopril (PRINIVIL,ZESTRIL) 10 MG tablet, Take 0.5 tablets by mouth Daily., Disp: , Rfl:     multivitamins-minerals (PRESERVISION AREDS 2) capsule capsule, , Disp: , Rfl:     olopatadine (PATANOL) 0.1 % ophthalmic solution, 1 drop., Disp: , Rfl:     pantoprazole (PROTONIX) 40 MG EC tablet, Take 1 tablet by mouth Daily., Disp: 90 tablet, Rfl: 1    Premarin 0.625 MG/GM vaginal cream, Insert 2 g into the vagina As Needed (Vaginal irritation)., Disp: 30 g, Rfl: 2    Pyridoxine HCl (VITAMIN B-6 PO), , Disp: , Rfl:     zolpidem CR (AMBIEN CR) 6.25 MG CR tablet, Take 1 tablet by mouth At Night As Needed for Sleep., Disp: 90 tablet, Rfl: 0    ciprofloxacin (Cipro) 500 MG tablet, Take 1 tablet by mouth 2 (Two) Times a Day for 10 days., Disp: 20 tablet, Rfl: 0    EPINEPHrine (EPIPEN) 0.3 MG/0.3ML solution auto-injector injection,  ", Disp: , Rfl:     Current Facility-Administered Medications:     albuterol (PROVENTIL) nebulizer solution 0.042% 1.25 mg/3mL, 1.25 mg, Nebulization, Q6H PRN, Carina, Marzena Neha, GUNJAN      Immunization History   Administered Date(s) Administered    COVID-19 (PFIZER) Purple Cap Monovalent 03/10/2021, 03/31/2021, 10/22/2021    Flu Vaccine Quad PF >36MO 09/26/2020    FluMist 2-49yrs 09/26/2020    Fluzone (or Fluarix & Flulaval for VFC) >6mos 09/17/2021    Fluzone High-Dose 65+yrs 10/04/2023    Fluzone Quad >6mos (Multi-dose) 09/26/2020    Pneumococcal Conjugate 20-Valent (PCV20) 11/23/2022    Shingrix 12/19/2021, 03/11/2022         Objective       Vitals:    02/01/24 0847   BP: 116/78   Pulse: 96   Temp: 98.2 °F (36.8 °C)   TempSrc: Temporal   SpO2: 98%   Weight: 84.2 kg (185 lb 9.6 oz)   Height: 160 cm (62.99\")      Body mass index is 32.89 kg/m².   Wt Readings from Last 3 Encounters:   02/01/24 84.2 kg (185 lb 9.6 oz)   11/30/23 87 kg (191 lb 12.8 oz)   11/02/23 89.8 kg (198 lb)      BP Readings from Last 3 Encounters:   02/01/24 116/78   11/30/23 96/67   11/02/23 101/69                Physical Exam  Vitals reviewed.   Constitutional:       Appearance: Normal appearance. She is well-developed.   HENT:      Head: Normocephalic and atraumatic.   Eyes:      Conjunctiva/sclera: Conjunctivae normal.      Pupils: Pupils are equal, round, and reactive to light.   Cardiovascular:      Rate and Rhythm: Normal rate and regular rhythm.      Heart sounds: Normal heart sounds. No murmur heard.  Pulmonary:      Effort: Pulmonary effort is normal.      Breath sounds: Normal breath sounds. No wheezing or rhonchi.   Abdominal:      General: Bowel sounds are normal. There is no distension.      Palpations: Abdomen is soft.      Tenderness: There is no abdominal tenderness.   Skin:     General: Skin is warm and dry.   Neurological:      Mental Status: She is alert and oriented to person, place, and time.   Psychiatric:         Mood and " Affect: Mood and affect normal.         Behavior: Behavior normal.         Thought Content: Thought content normal.         Judgment: Judgment normal.             Result Review :       Common labs          5/31/2023    07:17   Common Labs   Glucose 87    BUN 9    Creatinine 1.19    Sodium 137    Potassium 4.3    Chloride 104    Calcium 9.4    Albumin 3.7    Total Bilirubin 0.3    Alkaline Phosphatase 91    AST (SGOT) 33    ALT (SGPT) 28    WBC 6.52    Hemoglobin 12.2    Hematocrit 36.9    Platelets 329    Total Cholesterol 183    Triglycerides 113    HDL Cholesterol 70    LDL Cholesterol  93                       Assessment and Plan        Diagnoses and all orders for this visit:    1. Primary insomnia (Primary)  -     zolpidem CR (AMBIEN CR) 6.25 MG CR tablet; Take 1 tablet by mouth At Night As Needed for Sleep.  Dispense: 90 tablet; Refill: 0    2. Bronchiectasis with acute lower respiratory infection  Comments:  albuterol bid x 5 days and prn  Orders:  -     brompheniramine-pseudoephedrine-DM 30-2-10 MG/5ML syrup; Take 5 mL by mouth 4 (Four) Times a Day As Needed for Allergies.  Dispense: 473 mL; Refill: 0    3. Long-term use of high-risk medication  -     POC Medline 12 Panel Urine Drug Screen    4. Subacute cough  -     brompheniramine-pseudoephedrine-DM 30-2-10 MG/5ML syrup; Take 5 mL by mouth 4 (Four) Times a Day As Needed for Allergies.  Dispense: 473 mL; Refill: 0    5. Postmenopausal  -     Calcium Carb-Cholecalciferol (Os-Nick Calcium + D3) 500-5 MG-MCG tablet per tablet; Take 1 tablet by mouth 2 (Two) Times a Day.  Dispense: 180 tablet; Refill: 1    6. Rheumatoid arthritis involving multiple sites with positive rheumatoid factor  -     celecoxib (CeleBREX) 200 MG capsule; Take 1 capsule by mouth Daily.  Dispense: 90 capsule; Refill: 1    7. Chronic pain of left knee  -     Diclofenac Sodium (VOLTAREN) 1 % gel gel; Apply 2 g topically to the appropriate area as directed 4 (Four) Times a Day As Needed (knee  pain).  Dispense: 100 g; Refill: 1    8. Acute left-sided low back pain with left-sided sciatica  -     DULoxetine (CYMBALTA) 30 MG capsule; Take 1 capsule by mouth 2 (Two) Times a Day.  Dispense: 60 capsule; Refill: 2  -     Discontinue: gabapentin (NEURONTIN) 300 MG capsule; Take 1 capsule by mouth 3 (Three) Times a Day.  Dispense: 120 capsule; Refill: 0  -     gabapentin (NEURONTIN) 300 MG capsule; Take 2 capsules by mouth every night at bedtime.    9. Herpes zoster with other complication  -     DULoxetine (CYMBALTA) 30 MG capsule; Take 1 capsule by mouth 2 (Two) Times a Day.  Dispense: 60 capsule; Refill: 2    10. Gastroesophageal reflux disease without esophagitis  -     famotidine (PEPCID) 40 MG tablet; Take 1 tablet by mouth Daily.  Dispense: 90 tablet; Refill: 2  -     pantoprazole (PROTONIX) 40 MG EC tablet; Take 1 tablet by mouth Daily.  Dispense: 90 tablet; Refill: 1    11. Sciatica of left side  -     Discontinue: gabapentin (NEURONTIN) 300 MG capsule; Take 1 capsule by mouth 3 (Three) Times a Day.  Dispense: 120 capsule; Refill: 0  -     gabapentin (NEURONTIN) 300 MG capsule; Take 2 capsules by mouth every night at bedtime.    12. Herpes zoster without complication  Comments:  take 600 mg gabapentin at bedtime to help with pain and will send topical compound cream for daytime use.  Orders:  -     Discontinue: gabapentin (NEURONTIN) 300 MG capsule; Take 1 capsule by mouth 3 (Three) Times a Day.  Dispense: 120 capsule; Refill: 0  -     gabapentin (NEURONTIN) 300 MG capsule; Take 2 capsules by mouth every night at bedtime.    13. Lower respiratory infection (e.g., bronchitis, pneumonia, pneumonitis, pulmonitis)  -     ciprofloxacin (Cipro) 500 MG tablet; Take 1 tablet by mouth 2 (Two) Times a Day for 10 days.  Dispense: 20 tablet; Refill: 0  -     Ambulatory Referral to Pulmonology    14. Primary hypertension  Assessment & Plan:  Try 0.5 tab 5 mg daily and monitor b/p.         Patient advised to monitor  blood pressure at home and journal readings.  Patient informed that a blood pressure reading at home of more than 130/80 is considered hypertension.  For readings greater than 140/90 or higher patient is advised to follow-up in the office with readings for management.  Patient advised to limit sodium intake.    Obtained a written consent for [employer]ESA query. Discussed the risks and benefits of the use of controlled substances with the patient, including the risk of tolerance and drug dependence.  The patient has been counseled on the need to have an exit strategy, including potentially discontinuing the use of controlled substances.  LEESA has or will be reviewed as soon as it becomes available.    Follow Up     Return in about 3 months (around 5/1/2024) for Next scheduled follow up.    Patient was given instructions and counseling regarding her condition or for health maintenance advice. Please see specific information pulled into the AVS if appropriate.     GUNJAN Malloy

## 2023-11-13 DIAGNOSIS — M54.42 ACUTE LEFT-SIDED LOW BACK PAIN WITH LEFT-SIDED SCIATICA: ICD-10-CM

## 2023-11-13 DIAGNOSIS — B02.9 HERPES ZOSTER WITHOUT COMPLICATION: ICD-10-CM

## 2023-11-14 ENCOUNTER — TREATMENT (OUTPATIENT)
Dept: PHYSICAL THERAPY | Facility: CLINIC | Age: 68
End: 2023-11-14
Payer: MEDICARE

## 2023-11-14 RX ORDER — OXYCODONE HYDROCHLORIDE AND ACETAMINOPHEN 5; 325 MG/1; MG/1
1 TABLET ORAL EVERY 6 HOURS PRN
Qty: 30 TABLET | Refills: 0 | Status: SHIPPED | OUTPATIENT
Start: 2023-11-14

## 2023-11-22 ENCOUNTER — TRANSCRIBE ORDERS (OUTPATIENT)
Dept: ADMINISTRATIVE | Facility: HOSPITAL | Age: 68
End: 2023-11-22
Payer: MEDICARE

## 2023-11-22 DIAGNOSIS — R59.1 GENERALIZED ENLARGED LYMPH NODES: ICD-10-CM

## 2023-11-22 DIAGNOSIS — C88.4 EXTRANODAL MARGINAL ZONE B-CELL LYMPHOMA OF MUCOSA-ASSOCIATED LYMPHOID TISSUE (MALT-LYMPHOMA): ICD-10-CM

## 2023-11-22 DIAGNOSIS — C16.9 MALIGNANT NEOPLASM OF STOMACH, UNSPECIFIED LOCATION: Primary | ICD-10-CM

## 2023-11-30 ENCOUNTER — OFFICE VISIT (OUTPATIENT)
Dept: FAMILY MEDICINE CLINIC | Facility: CLINIC | Age: 68
End: 2023-11-30
Payer: MEDICARE

## 2023-11-30 VITALS
OXYGEN SATURATION: 96 % | WEIGHT: 191.8 LBS | SYSTOLIC BLOOD PRESSURE: 96 MMHG | DIASTOLIC BLOOD PRESSURE: 67 MMHG | HEART RATE: 98 BPM | HEIGHT: 63 IN | TEMPERATURE: 97 F | BODY MASS INDEX: 33.98 KG/M2

## 2023-11-30 DIAGNOSIS — R03.1 LOW BLOOD PRESSURE READING: ICD-10-CM

## 2023-11-30 DIAGNOSIS — B02.8 HERPES ZOSTER WITH OTHER COMPLICATION: ICD-10-CM

## 2023-11-30 DIAGNOSIS — I83.029 VENOUS STASIS ULCER OF LEFT LOWER EXTREMITY: ICD-10-CM

## 2023-11-30 DIAGNOSIS — M54.42 ACUTE LEFT-SIDED LOW BACK PAIN WITH LEFT-SIDED SCIATICA: Primary | ICD-10-CM

## 2023-11-30 DIAGNOSIS — J47.0 BRONCHIECTASIS WITH ACUTE LOWER RESPIRATORY INFECTION: ICD-10-CM

## 2023-11-30 DIAGNOSIS — R05.2 SUBACUTE COUGH: ICD-10-CM

## 2023-11-30 DIAGNOSIS — L97.929 VENOUS STASIS ULCER OF LEFT LOWER EXTREMITY: ICD-10-CM

## 2023-11-30 PROCEDURE — 3074F SYST BP LT 130 MM HG: CPT | Performed by: NURSE PRACTITIONER

## 2023-11-30 PROCEDURE — 3078F DIAST BP <80 MM HG: CPT | Performed by: NURSE PRACTITIONER

## 2023-11-30 PROCEDURE — 87070 CULTURE OTHR SPECIMN AEROBIC: CPT | Performed by: NURSE PRACTITIONER

## 2023-11-30 PROCEDURE — 99214 OFFICE O/P EST MOD 30 MIN: CPT | Performed by: NURSE PRACTITIONER

## 2023-11-30 PROCEDURE — 87077 CULTURE AEROBIC IDENTIFY: CPT | Performed by: NURSE PRACTITIONER

## 2023-11-30 PROCEDURE — 87186 SC STD MICRODIL/AGAR DIL: CPT | Performed by: NURSE PRACTITIONER

## 2023-11-30 RX ORDER — BROMPHENIRAMINE MALEATE, PSEUDOEPHEDRINE HYDROCHLORIDE, AND DEXTROMETHORPHAN HYDROBROMIDE 2; 30; 10 MG/5ML; MG/5ML; MG/5ML
5 SYRUP ORAL 4 TIMES DAILY PRN
Qty: 473 ML | Refills: 0 | Status: SHIPPED | OUTPATIENT
Start: 2023-11-30

## 2023-11-30 RX ORDER — OXYCODONE HYDROCHLORIDE AND ACETAMINOPHEN 5; 325 MG/1; MG/1
1 TABLET ORAL EVERY 6 HOURS PRN
Qty: 30 TABLET | Refills: 0 | Status: CANCELLED | OUTPATIENT
Start: 2023-11-30

## 2023-11-30 RX ORDER — DULOXETIN HYDROCHLORIDE 30 MG/1
30 CAPSULE, DELAYED RELEASE ORAL 2 TIMES DAILY
Qty: 60 CAPSULE | Refills: 2 | Status: SHIPPED | OUTPATIENT
Start: 2023-11-30

## 2023-11-30 RX ORDER — GINSENG 100 MG
1 CAPSULE ORAL 2 TIMES DAILY
Qty: 453.9 G | Refills: 0 | Status: SHIPPED | OUTPATIENT
Start: 2023-11-30

## 2023-11-30 RX ORDER — BROMPHENIRAMINE MALEATE, PSEUDOEPHEDRINE HYDROCHLORIDE, AND DEXTROMETHORPHAN HYDROBROMIDE 2; 30; 10 MG/5ML; MG/5ML; MG/5ML
SYRUP ORAL
COMMUNITY
Start: 2023-11-06 | End: 2023-11-30 | Stop reason: SDUPTHER

## 2023-11-30 RX ORDER — DOXYCYCLINE HYCLATE 100 MG/1
100 CAPSULE ORAL 2 TIMES DAILY
Qty: 20 CAPSULE | Refills: 0 | Status: SHIPPED | OUTPATIENT
Start: 2023-11-30 | End: 2023-12-04 | Stop reason: ALTCHOICE

## 2023-11-30 RX ORDER — HYDROCODONE BITARTRATE AND ACETAMINOPHEN 10; 325 MG/1; MG/1
1 TABLET ORAL EVERY 8 HOURS PRN
Qty: 90 TABLET | Refills: 0 | Status: SHIPPED | OUTPATIENT
Start: 2023-11-30 | End: 2023-12-30

## 2023-11-30 NOTE — LETTER
November 30, 2023     Patient: Jeimy Willard   YOB: 1955   Date of Visit: 11/30/2023       To Whom It May Concern:    It is my medical opinion that Jeimy Willard can do physical therapy with her left upper extremity covered.            Sincerely,        GUNJAN Malloy

## 2023-11-30 NOTE — LETTER
November 30, 2023     Patient: Jeimy Willard   YOB: 1955   Date of Visit: 11/30/2023       To Whom It May Concern:    It is my medical opinion that Jeimy Willard is able to do physical therapy with her left lower extremity wound.            Sincerely,        GUNJAN Malloy

## 2023-11-30 NOTE — PROGRESS NOTES
Chief Complaint  Herpes Zoster (Follow up ), URI (Chest congestion), and Cough    Subjective          Jeimy Willard is a 68 y.o. female who presents to [facility]    History of Present Illness    Ulcer of left upper ext. Approx 2 in x 1/2 in. Pt only used peroxide once. Pt removed the black skin off that was like leather.     Ulcer doesn't have any pain.     Productive sputum productive cough ongoing since 2.2023. pt denies any fever. Pt has taken 4 bottles of brompheniramine/dm/pse. CT chest on 4.14.2023, Bronchiectasis with chronic airspace disease in both lungs, stable compared with the prior CT.      Review of Systems   Constitutional:  Negative for chills, fatigue and fever.   Respiratory:  Positive for cough and shortness of breath.    Cardiovascular:  Negative for chest pain and palpitations.   Gastrointestinal:  Negative for constipation, diarrhea, nausea and vomiting.   Musculoskeletal:  Negative for back pain and neck pain.   Skin:  Negative for rash.   Neurological:  Negative for dizziness and headaches.          Medical History: has a past medical history of Allergic rhinitis, Anemia, Arthritis, Cancer, Cholelithiasis (2000), Colon polyp, GERD (gastroesophageal reflux disease), History of transfusion, Hypertension, Insomnia, Lymphoma (1999), Osteoporosis, Seasonal allergies, Stomach cancer (2005), Stomach disorder, Ulcer (traumatic) of oral mucosa, and Vitamin D deficiency.     Surgical History: has a past surgical history that includes Cholecystectomy; Colonoscopy (02/20/2019); Esophagogastroduodenoscopy (03/13/2020); Gallbladder surgery; Laparoscopic total gastrectomy; and Replacement total knee (Left, 2020).     Family History: family history includes Alzheimer's disease in her maternal grandfather; Arthritis in her mother; Breast cancer in her mother; Cancer in her brother and mother; Diabetes in her brother, father, and mother; Esophageal cancer in her paternal  grandmother; Heart disease in her brother and mother; Heart failure in her mother; Hypertension in her brother and mother; Kidney disease in her brother and mother; Kidney failure in her mother; Lung cancer in her maternal grandmother; Miscarriages / Stillbirths in her mother and sister; Stroke in her mother.     Social History: reports that she has never smoked. She has never been exposed to tobacco smoke. She has never used smokeless tobacco. She reports that she does not drink alcohol and does not use drugs.    Allergies: Heparin      Health Maintenance Due   Topic Date Due    TDAP/TD VACCINES (1 - Tdap) Never done    HEPATITIS C SCREENING  Never done    COVID-19 Vaccine (5 - 2023-24 season) 09/01/2023            Current Outpatient Medications:     acetaminophen (TYLENOL) 325 MG tablet, Take 2 tablets by mouth Every 6 (Six) Hours As Needed., Disp: , Rfl:     azelastine (ASTELIN) 0.1 % nasal spray, , Disp: , Rfl:     bacitracin 500 UNIT/GM ointment, Apply 1 application  topically to the appropriate area as directed 2 (Two) Times a Day., Disp: 453.9 g, Rfl: 0    brompheniramine-pseudoephedrine-DM 30-2-10 MG/5ML syrup, Take 5 mL by mouth 4 (Four) Times a Day As Needed for Allergies., Disp: 473 mL, Rfl: 0    Calcium Carb-Cholecalciferol (Os-Nick Calcium + D3) 500-5 MG-MCG tablet per tablet, Take 1 tablet by mouth 2 (Two) Times a Day., Disp: 180 tablet, Rfl: 1    celecoxib (CeleBREX) 200 MG capsule, Take 1 capsule by mouth Daily., Disp: 90 capsule, Rfl: 1    cetirizine (zyrTEC) 10 MG tablet, , Disp: , Rfl:     cyanocobalamin 1000 MCG/ML injection, , Disp: , Rfl:     Diclofenac Sodium (VOLTAREN) 1 % gel gel, Apply 2 g topically to the appropriate area as directed 4 (Four) Times a Day As Needed (knee pain)., Disp: 100 g, Rfl: 1    diphenhydrAMINE (BENADRYL) 25 mg capsule, Take 1 capsule by mouth Every 6 (Six) Hours As Needed., Disp: , Rfl:     famotidine (PEPCID) 40 MG tablet, Take 1 tablet by mouth Daily., Disp: 90  tablet, Rfl: 2    fluticasone (FLONASE) 50 MCG/ACT nasal spray, , Disp: , Rfl:     folic acid (FOLVITE) 1 MG tablet, Take 1 tablet by mouth Daily., Disp: 90 tablet, Rfl: 3    gabapentin (NEURONTIN) 300 MG capsule, Take 1 capsule by mouth 3 (Three) Times a Day., Disp: 120 capsule, Rfl: 0    lisinopril (PRINIVIL,ZESTRIL) 10 MG tablet, Take 0.5 tablets by mouth Daily., Disp: , Rfl:     olopatadine (PATANOL) 0.1 % ophthalmic solution, 1 drop., Disp: , Rfl:     oxyCODONE-acetaminophen (Percocet) 5-325 MG per tablet, Take 1 tablet by mouth Every 6 (Six) Hours As Needed for Moderate Pain., Disp: 30 tablet, Rfl: 0    pantoprazole (PROTONIX) 40 MG EC tablet, Take 1 tablet by mouth Daily., Disp: 90 tablet, Rfl: 1    Premarin 0.625 MG/GM vaginal cream, Insert 2 g into the vagina As Needed (Vaginal irritation)., Disp: 30 g, Rfl: 2    Pyridoxine HCl (VITAMIN B-6 PO), , Disp: , Rfl:     zolpidem CR (AMBIEN CR) 6.25 MG CR tablet, Take 1 tablet by mouth At Night As Needed for Sleep., Disp: 90 tablet, Rfl: 0    cyclobenzaprine (FLEXERIL) 10 MG tablet, Take 1 tablet by mouth 3 (Three) Times a Day As Needed for Muscle Spasms. (Patient not taking: Reported on 11/30/2023), Disp: 30 tablet, Rfl: 2    doxycycline (VIBRAMYCIN) 100 MG capsule, Take 1 capsule by mouth 2 (Two) Times a Day for 10 days., Disp: 20 capsule, Rfl: 0    DULoxetine (CYMBALTA) 30 MG capsule, Take 1 capsule by mouth 2 (Two) Times a Day., Disp: 60 capsule, Rfl: 2    EPINEPHrine (EPIPEN) 0.3 MG/0.3ML solution auto-injector injection, , Disp: , Rfl:     HYDROcodone-acetaminophen (Norco)  MG per tablet, Take 1 tablet by mouth Every 8 (Eight) Hours As Needed for Moderate Pain for up to 30 days., Disp: 90 tablet, Rfl: 0    hydrOXYzine (ATARAX) 25 MG tablet, Take 1 tablet by mouth Every 6 (Six) Hours As Needed for Itching. (Patient not taking: Reported on 11/30/2023), Disp: 30 tablet, Rfl: 1    Iron-Vitamin C (Vitron-C)  MG tablet, Vitron-C 65 mg iron- 125 mg oral  "tablet,delayed release (DR/EC) take 1 tablet by oral route daily   Active (Patient not taking: Reported on 11/30/2023), Disp: , Rfl:     Current Facility-Administered Medications:     albuterol (PROVENTIL) nebulizer solution 0.042% 1.25 mg/3mL, 1.25 mg, Nebulization, Q6H PRN, Carina, Marzena Solomon, GUNJAN      Immunization History   Administered Date(s) Administered    COVID-19 (PFIZER) Purple Cap Monovalent 03/10/2021, 03/31/2021, 10/22/2021    Flu Vaccine Quad PF >36MO 09/26/2020    FluMist 2-49yrs 09/26/2020    Fluzone (or Fluarix & Flulaval for VFC) >6mos 09/17/2021    Fluzone High-Dose 65+yrs 10/04/2023    Fluzone Quad >6mos (Multi-dose) 09/26/2020    Pneumococcal Conjugate 20-Valent (PCV20) 11/23/2022    Shingrix 12/19/2021, 03/11/2022         Objective       Vitals:    11/30/23 0755   BP: 96/67   Pulse: 98   Temp: 97 °F (36.1 °C)   TempSrc: Temporal   SpO2: 96%   Weight: 87 kg (191 lb 12.8 oz)   Height: 160 cm (62.99\")      Body mass index is 33.98 kg/m².   Wt Readings from Last 3 Encounters:   11/30/23 87 kg (191 lb 12.8 oz)   11/02/23 89.8 kg (198 lb)   10/31/23 91.6 kg (202 lb)      BP Readings from Last 3 Encounters:   11/30/23 96/67   11/02/23 101/69   10/09/23 119/76                Physical Exam  Vitals reviewed.   Constitutional:       Appearance: Normal appearance. She is well-developed.   HENT:      Head: Normocephalic and atraumatic.   Eyes:      Conjunctiva/sclera: Conjunctivae normal.      Pupils: Pupils are equal, round, and reactive to light.   Cardiovascular:      Rate and Rhythm: Normal rate and regular rhythm.      Heart sounds: Normal heart sounds. No murmur heard.  Pulmonary:      Effort: Pulmonary effort is normal.      Breath sounds: Examination of the right-lower field reveals wheezing. Wheezing present. No rhonchi.   Abdominal:      General: Bowel sounds are normal. There is no distension.      Palpations: Abdomen is soft.      Tenderness: There is no abdominal tenderness.   Skin:     " General: Skin is warm and dry.   Neurological:      Mental Status: She is alert and oriented to person, place, and time.   Psychiatric:         Mood and Affect: Mood and affect normal.         Behavior: Behavior normal.         Thought Content: Thought content normal.         Judgment: Judgment normal.             Result Review :       Common labs          5/31/2023    07:17   Common Labs   Glucose 87    BUN 9    Creatinine 1.19    Sodium 137    Potassium 4.3    Chloride 104    Calcium 9.4    Albumin 3.7    Total Bilirubin 0.3    Alkaline Phosphatase 91    AST (SGOT) 33    ALT (SGPT) 28    WBC 6.52    Hemoglobin 12.2    Hematocrit 36.9    Platelets 329    Total Cholesterol 183    Triglycerides 113    HDL Cholesterol 70    LDL Cholesterol  93                       Assessment and Plan        Diagnoses and all orders for this visit:    1. Acute left-sided low back pain with left-sided sciatica (Primary)  -     DULoxetine (CYMBALTA) 30 MG capsule; Take 1 capsule by mouth 2 (Two) Times a Day.  Dispense: 60 capsule; Refill: 2    2. Herpes zoster with other complication  -     bacitracin 500 UNIT/GM ointment; Apply 1 application  topically to the appropriate area as directed 2 (Two) Times a Day.  Dispense: 453.9 g; Refill: 0  -     HYDROcodone-acetaminophen (Norco)  MG per tablet; Take 1 tablet by mouth Every 8 (Eight) Hours As Needed for Moderate Pain for up to 30 days.  Dispense: 90 tablet; Refill: 0  -     DULoxetine (CYMBALTA) 30 MG capsule; Take 1 capsule by mouth 2 (Two) Times a Day.  Dispense: 60 capsule; Refill: 2    3. Venous stasis ulcer of left lower extremity  Comments:  wet to dry dressings. vasoline. no neosporin or peroxide. If not better in 7-10 days recommend wound care comsult.  Orders:  -     HYDROcodone-acetaminophen (Norco)  MG per tablet; Take 1 tablet by mouth Every 8 (Eight) Hours As Needed for Moderate Pain for up to 30 days.  Dispense: 90 tablet; Refill: 0    4. Subacute cough  -      doxycycline (VIBRAMYCIN) 100 MG capsule; Take 1 capsule by mouth 2 (Two) Times a Day for 10 days.  Dispense: 20 capsule; Refill: 0  -     Respiratory Culture - Sputum, Cough; Future  -     brompheniramine-pseudoephedrine-DM 30-2-10 MG/5ML syrup; Take 5 mL by mouth 4 (Four) Times a Day As Needed for Allergies.  Dispense: 473 mL; Refill: 0  -     Respiratory Culture - Sputum, Cough    5. Bronchiectasis with acute lower respiratory infection  Comments:  albuterol bid x 5 days and prn  Orders:  -     doxycycline (VIBRAMYCIN) 100 MG capsule; Take 1 capsule by mouth 2 (Two) Times a Day for 10 days.  Dispense: 20 capsule; Refill: 0  -     Respiratory Culture - Sputum, Cough; Future  -     brompheniramine-pseudoephedrine-DM 30-2-10 MG/5ML syrup; Take 5 mL by mouth 4 (Four) Times a Day As Needed for Allergies.  Dispense: 473 mL; Refill: 0  -     Respiratory Culture - Sputum, Cough    6. Low blood pressure reading  Comments:  reduce lisinopril to 5 mg daily.    Obtained a written consent for LEESA query. Discussed the risks and benefits of the use of controlled substances with the patient, including the risk of tolerance and drug dependence.  The patient has been counseled on the need to have an exit strategy, including potentially discontinuing the use of controlled substances.  LEESA has or will be reviewed as soon as it becomes available.      Follow Up     Return for Keep follow up as scheduled.    Patient was given instructions and counseling regarding her condition or for health maintenance advice. Please see specific information pulled into the AVS if appropriate.     GUNJAN Malloy

## 2023-12-04 LAB
BACTERIA SPEC RESP CULT: ABNORMAL
BACTERIA SPEC RESP CULT: ABNORMAL
GRAM STN SPEC: ABNORMAL
GRAM STN SPEC: ABNORMAL

## 2023-12-04 RX ORDER — CIPROFLOXACIN 500 MG/1
500 TABLET, FILM COATED ORAL 2 TIMES DAILY
Qty: 20 TABLET | Refills: 0 | Status: SHIPPED | OUTPATIENT
Start: 2023-12-04 | End: 2023-12-14

## 2023-12-14 ENCOUNTER — TREATMENT (OUTPATIENT)
Dept: PHYSICAL THERAPY | Facility: CLINIC | Age: 68
End: 2023-12-14
Payer: MEDICARE

## 2023-12-14 DIAGNOSIS — G89.29 CHRONIC MIDLINE LOW BACK PAIN, UNSPECIFIED WHETHER SCIATICA PRESENT: Primary | ICD-10-CM

## 2023-12-14 DIAGNOSIS — R29.898 WEAKNESS OF LEFT LOWER EXTREMITY: ICD-10-CM

## 2023-12-14 DIAGNOSIS — M54.16 RADICULOPATHY, LUMBAR REGION: ICD-10-CM

## 2023-12-14 DIAGNOSIS — M54.32 SCIATICA OF LEFT SIDE: ICD-10-CM

## 2023-12-14 DIAGNOSIS — M54.50 CHRONIC MIDLINE LOW BACK PAIN, UNSPECIFIED WHETHER SCIATICA PRESENT: Primary | ICD-10-CM

## 2023-12-14 PROCEDURE — 97112 NEUROMUSCULAR REEDUCATION: CPT | Performed by: PHYSICAL THERAPIST

## 2023-12-14 PROCEDURE — 97110 THERAPEUTIC EXERCISES: CPT | Performed by: PHYSICAL THERAPIST

## 2023-12-14 PROCEDURE — 97161 PT EVAL LOW COMPLEX 20 MIN: CPT | Performed by: PHYSICAL THERAPIST

## 2023-12-14 NOTE — PROGRESS NOTES
Physical Therapy Initial Evaluation and Plan of Care  [address], [address] [place] [address]        Patient: Jeimy Willard   : 1955  Diagnosis/ICD-10 Code:  Chronic midline low back pain, unspecified whether sciatica present [M54.50, G89.29]  Referring practitioner: GUNJAN Malloy  Date of Initial Visit: 2023  Today's Date: 2023  Patient seen for 1 sessions         Lumbar MRI results:  FINDINGS:          There is diminished disc height and signal in the lower thoracic spine and also at L1-L2 and L5-S1   consistent with degenerative disc disease.  There is an old T12 and L1 mild compression fracture.    There is no marrow edema to indicate an acute bony abnormality.  There is multilevel endplate   spurring.  This is actually most prominent in the lower thoracic spine at T11-T12 and T12-L1   consistent with degenerative spondylosis.  There is no paraspinal hematoma or fluid collection.    The tip of the conus medullaris terminates at the T12-L1 level and is unremarkable.     At L5-S1, there is a disc osteophyte complex.  There is mild facet arthritis.  There is no central   canal stenosis.  There is slight narrowing of the neural foramina bilaterally.     At L4-L5, there are facet hypertrophic degenerative changes with ligamentum flavum thickening.    There is a synovial cyst protruding off the posterior aspect of the right facet joint measuring 7   mm in diameter.  There is no significant central canal stenosis.  There is slight narrowing of the   neural foramina bilaterally.     At L3-L4, there is facet arthritis with ligamentum flavum thickening.  There is no herniated disc,   canal, or neural foraminal stenosis.     At L2-L3, the facet joints are intact.  There is no herniated disc, canal, or neural foraminal   stenosis.     At L1-L2, the facet joints are intact.  There is a mild disc osteophyte complex.  There is no   significant canal stenosis.  There is slight narrowing of the  neural foramina bilaterally.     At T12-L1, the facet joints are intact.  There is no herniated disc, canal, or neural foraminal   stenosis.     Subjective Questionnaire: GENESIS: 24/50    Subjective Evaluation    History of Present Illness  Mechanism of injury: Pt reports that she has lower back pain and L LE pain since September.  Pt reports that she was dx with shingles of L LE in October.  Pt reports that pain from this has improved by about 70%.  Pt reports that she still has some sores but the MD states that she is no longer contagious.  Pt reports that she still has lower back pain with L LE numbness/tingling and soreness.  Pt reports L LE weakness and that she falls about once a week due to her L LE giving out.  Pt reports that she is taking oxydone for pain.  Pt reports that bending/carrying increases her pain.  Pt reports that her next MD follow up for shingles is 23.  Pt reports that she is not having R LE symptoms.  Pt reports that she is not currently working.  Pt would like to get back to [facility] [place].      Pain  Current pain ratin  At best pain ratin  At worst pain ratin  Quality: dull ache, discomfort and radiating    Social Support  Lives with: spouse    Diagnostic Tests  MRI studies: abnormal    Patient Goals  Patient goals for therapy: decreased pain, increased motion, increased strength and return to sport/leisure activities           Objective          Static Posture     Head  Forward.    Shoulders  Elevated and rounded.    Thoracic Spine  Hyperkyphosis.    Lumbar Spine   Decreased lordosis.     Hip   Hip (Left): Externally rotated.   Hip (Right): Externally rotated.     Knee   Genu valgus.     Active Range of Motion     Additional Active Range of Motion Details  Lumbar AROM:   Flexion: 25% limited  Extension: WFL (pain)  Rotation: 75% limited R and L (pain)  Lateral flexion: 75% limited bilaterally (pain)      Strength/Myotome Testing     Left Hip   Planes of Motion   Flexion:  4-  Extension: 4-  Abduction: 4-    Right Hip   Planes of Motion   Flexion: 4  Extension: 4-  Abduction: 4-    Left Knee   Flexion: 4-  Extension: 4-    Right Knee   Flexion: 4+  Extension: 4+    Left Ankle/Foot   Dorsiflexion: 5    Right Ankle/Foot   Dorsiflexion: 5    Muscle Activation     Additional Muscle Activation Details  Poor PPT, TA activation    Tests     Lumbar     Left   Negative quadrant.     Right   Negative quadrant.     Ambulation     Observational Gait   Decreased walking speed, left step length and right step length.   Base of support: increased    Additional Observational Gait Details  Mild antalgic gait on L LE with R lateral lean to clear L LE          Assessment & Plan       Assessment  Impairments: abnormal or restricted ROM, activity intolerance, impaired physical strength, lacks appropriate home exercise program and pain with function   Functional limitations: lifting, sleeping, walking, uncomfortable because of pain, sitting, standing and unable to perform repetitive tasks   Assessment details: Patient presents with signs/symptoms consistent with lower back pain with L LE radiculopathy including decreased lumbar ROM, bilateral hip/core and L LE weakness and reports of pain limiting function during ADLs as shown on the GENESIS. Pt also has been dx with shingles affecting the L LE.  Due to L LE shingles pain manual therapy will be limited and pain differential between shingles vs radiculopathy will be difficult.  Due to this the pt will be placed on hold for a month until shingles symptoms resolve and tolerance to PT interventions improved.  Pt is comfortable with this plan.  Patient would benefit from skilled PT services in order to address deficits limiting function at this time.  HEP was given to patient this session and education on HEP/diagnosis provided to patient.        Goals  Plan Goals: 1. The patient complains of low back/ L LE radicular pain.  LTG 1: 12 weeks:  The patient will report  a pain rating of 1/10 or better in order to improve  tolerance to activities of daily living and improve sleep quality.  STATUS:  New  STG 1a: 6 weeks:  The patient will report a pain rating of 3/10 or better.  STATUS:  New    2. The patient demonstrates weakness of the bilateral hips.  LTG 2: 12 weeks:  The patient will demonstrate 4+ /5 strength for bilateral hip flexion, abduction,  and extension in order to improve hip stability.  STATUS:  New  STG 2a: 6 weeks:  The patient will demonstrate 4 /5 strength for bilateral hip flexion, abduction,  and extension.  STATUS:  New    3. The patient has gait dysfunction.  LTG 3: 12 weeks:  The patient will ambulate without assistive device, independently, for   community distances with minimal limp in order to improve mobility and allow   patient to perform activities such as grocery shopping with greater ease.  STATUS:  New    4. Mobility: Walking/Moving Around Functional Limitation    LTG 1: 12 weeks:  The patient will demonstrate 16 % limitation by achieving a score of 8/50 on the GENESIS.  STATUS:  New  LTG 1: 12 weeks:  The patient will demonstrate 30 % limitation by achieving a score of 15/50 on the GENESIS.  STATUS:  New      Plan  Therapy options: will be seen for skilled therapy services  Planned modality interventions: thermotherapy (hydrocollator packs) and cryotherapy  Planned therapy interventions: manual therapy, abdominal trunk stabilization, ADL retraining, neuromuscular re-education, body mechanics training, postural training, soft tissue mobilization, flexibility, spinal/joint mobilization, functional ROM exercises, strengthening, stretching, gait training, home exercise program, therapeutic activities and joint mobilization  Frequency: 2x week  Duration in weeks: 12  Treatment plan discussed with: patient        Timed:         Manual Therapy:    0     mins  85807;     Therapeutic Exercise:    10     mins  78000;     Neuromuscular Edu:    10    mins  86541;     Therapeutic Activity:     3     mins  13887;     Gait Trainin     mins  26172;     Ultrasound:     0     mins  22170;    Ionto                               0    mins   01364  Self-care  __0__ mins 31252    Un-Timed:  Electrical Stimulation:    0     mins  99751 ( );  Traction     0     mins 15640  Low Eval     27     Mins  09145  Mod Eval     0     Mins  55857  High Eval                       0     Mins  24018  Hot pack     0     Mins    Cold pack                       0     Mins      Timed Treatment:   23   mins   Total Treatment:     50   mins    PT SIGNATURE: Nehal Avery, CHULA      Electronically signed 2023  [place] License: [id number]    Initial Certification    Certification Period: 2023 thru 3/12/2024  NPI: [id number]  I certify that the therapy services are furnished while this patient is under my care.  The services outlined above are required by this patient, and will be reviewed every 90 days.     PHYSICIAN: Jeaneth Barker APRN      DATE:     Please sign and return via fax to [phone].. Thank you, [facility].

## 2023-12-19 ENCOUNTER — PATIENT MESSAGE (OUTPATIENT)
Dept: FAMILY MEDICINE CLINIC | Facility: CLINIC | Age: 68
End: 2023-12-19
Payer: MEDICARE

## 2023-12-19 DIAGNOSIS — M54.42 ACUTE LEFT-SIDED LOW BACK PAIN WITH LEFT-SIDED SCIATICA: ICD-10-CM

## 2023-12-19 DIAGNOSIS — B02.9 HERPES ZOSTER WITHOUT COMPLICATION: ICD-10-CM

## 2023-12-19 DIAGNOSIS — M54.32 SCIATICA OF LEFT SIDE: ICD-10-CM

## 2023-12-20 RX ORDER — ALBUTEROL SULFATE 2.5 MG/3ML
2.5 SOLUTION RESPIRATORY (INHALATION) EVERY 4 HOURS PRN
Qty: 90 EACH | Refills: 2 | Status: SHIPPED | OUTPATIENT
Start: 2023-12-20

## 2023-12-20 RX ORDER — GABAPENTIN 300 MG/1
300 CAPSULE ORAL 3 TIMES DAILY
Qty: 120 CAPSULE | Refills: 0 | Status: SHIPPED | OUTPATIENT
Start: 2023-12-20

## 2023-12-20 NOTE — TELEPHONE ENCOUNTER
From: Jeimy Willard  To: Jeaneth Barker  Sent: 12/19/2023 4:36 PM EST  Subject: Albuterol    Albuterol was not on my med list, I have about 7 days of vials left, still have the deep coughing and sputum production. Please renew the prescription with  at the [facility].    Thanks

## 2024-01-17 ENCOUNTER — TREATMENT (OUTPATIENT)
Dept: PHYSICAL THERAPY | Facility: CLINIC | Age: 69
End: 2024-01-17
Payer: MEDICARE

## 2024-01-17 DIAGNOSIS — M54.50 CHRONIC MIDLINE LOW BACK PAIN, UNSPECIFIED WHETHER SCIATICA PRESENT: Primary | ICD-10-CM

## 2024-01-17 DIAGNOSIS — G89.29 CHRONIC MIDLINE LOW BACK PAIN, UNSPECIFIED WHETHER SCIATICA PRESENT: Primary | ICD-10-CM

## 2024-01-17 PROCEDURE — 97530 THERAPEUTIC ACTIVITIES: CPT | Performed by: PHYSICAL THERAPIST

## 2024-01-17 PROCEDURE — 97110 THERAPEUTIC EXERCISES: CPT | Performed by: PHYSICAL THERAPIST

## 2024-01-17 NOTE — PROGRESS NOTES
Progress note  [address], [address] [place] [address]      Patient: Jeimy Willard   : 1955  Diagnosis/ICD-10 Code:  Chronic midline low back pain, unspecified whether sciatica present [M54.50, G89.29]  Referring practitioner: GUNJAN Malloy  Date of Initial Visit: Type: THERAPY  Noted: 2023  Today's Date: 2024  Patient seen for 2 sessions        Subjective:   Jeimy Willard reports: mild lower back and L LE pain at beginning of session today.  Pt reports that she is having L LE pain just where shingles was located.  Pt reports that in general lower back and L LE have improved since evaluation.  Pt reports that she still has increased lower back pain with prolonged sitting and standing.  Pt reports that she continues to use a straight cane intermittently.  Pt reports decreased compliance with HEP.   Subjective Questionnaire: Oswestry:       Subjective   Objective   Active Range of Motion      Additional Active Range of Motion Details  Lumbar AROM:   Flexion: 25% limited  Extension: WFL (pain)  Rotation: 75% limited R and L (pain)  Lateral flexion: 75% limited bilaterally (pain)     Strength/Myotome Testing      Left Hip   Planes of Motion   Flexion: 3-  Extension: 4-  Abduction: 3+     Right Hip   Planes of Motion   Flexion: 4  Extension: 4-  Abduction: 4-     Left Knee   Flexion: 4-  Extension: 4-     Right Knee   Flexion: 4+  Extension: 4+     Left Ankle/Foot   Dorsiflexion: 5     Right Ankle/Foot   Dorsiflexion: 5     Muscle Activation      Additional Muscle Activation Details  Poor PPT, TA activation     Assessment & Plan       Assessment  Impairments: abnormal or restricted ROM, activity intolerance, impaired physical strength, lacks appropriate home exercise program and pain with function   Functional limitations: lifting, sleeping, walking, uncomfortable because of pain, sitting, standing and unable to perform repetitive tasks   Assessment details: Patient continues to present  with signs/symptoms consistent with lower back pain with L LE radiculopathy including decreased lumbar ROM, bilateral hip/core and L LE weakness and reports of pain limiting function during ADLs as shown on the GENESIS.  Pt continues to report L LE radicular pain localized around shingles scars/sores.  Pt now demonstrates no open sores.  It is still difficult to determine if L LE symptoms are from shingles or origin in lumbar spine.  Patient would continue to benefit from skilled PT services in order to address deficits limiting function at this time.  Pt educated again on importance of HEP.        Goals  Plan Goals: 1. The patient complains of low back/ L LE radicular pain.  LTG 1: 12 weeks:  The patient will report a pain rating of 1/10 or better in order to improve  tolerance to activities of daily living and improve sleep quality.  STATUS:  ongoing  STG 1a: 6 weeks:  The patient will report a pain rating of 3/10 or better.  STATUS:  ongoing    2. The patient demonstrates weakness of the bilateral hips.  LTG 2: 12 weeks:  The patient will demonstrate 4+ /5 strength for bilateral hip flexion, abduction,  and extension in order to improve hip stability.  STATUS:  ongoing  STG 2a: 6 weeks:  The patient will demonstrate 4 /5 strength for bilateral hip flexion, abduction,  and extension.  STATUS:  ongoing    3. The patient has gait dysfunction.  LTG 3: 12 weeks:  The patient will ambulate without assistive device, independently, for   community distances with minimal limp in order to improve mobility and allow   patient to perform activities such as grocery shopping with greater ease.  STATUS:  ongoing    4. Mobility: Walking/Moving Around Functional Limitation    LTG 1: 12 weeks:  The patient will demonstrate 16 % limitation by achieving a score of 8/50 on the GENESIS.  STATUS:  ongoing  LTG 1: 12 weeks:  The patient will demonstrate 30 % limitation by achieving a score of 15/50 on the GENESIS.  STATUS:   ongoing      Plan  Therapy options: will be seen for skilled therapy services  Planned modality interventions: thermotherapy (hydrocollator packs) and cryotherapy  Planned therapy interventions: manual therapy, abdominal trunk stabilization, ADL retraining, neuromuscular re-education, body mechanics training, postural training, soft tissue mobilization, flexibility, spinal/joint mobilization, functional ROM exercises, strengthening, stretching, gait training, home exercise program, therapeutic activities and joint mobilization  Frequency: 2x week  Duration in weeks: 8  Treatment plan discussed with: patient      Progress toward previous goals: Not Met      Recommendations: Continue as planned  Timeframe: 2 months  Prognosis to achieve goals: good    PT SIGNATURE: Nehal Avery, PT     Electronically signed 2024  DATE TREATMENT INITIATED: 2024  [place] License: [id number]      Based upon review of the patient's progress and continued therapy plan, it is my medical opinion that Jeimy Willard should continue physical therapy treatment at [facility] PHYSICAL THERAPY  [address] [id number] [phone].      Timed:  Manual Therapy:    0     mins  35734;  Therapeutic Exercise:    15     mins  80827;     Neuromuscular Edu:    0    mins  46715;    Therapeutic Activity:     8     mins  43254;     Gait Trainin     mins  72858;     Ultrasound:     0     mins  27618;    Electrical Stimulation:    0     mins  62475 ( );    Untimed:  Electrical Stimulation:    0     mins  45261 ( );  Mechanical Traction:    0     mins  89323;     Timed Treatment:   23   mins   Total Treatment:     40   mins

## 2024-01-24 ENCOUNTER — TREATMENT (OUTPATIENT)
Dept: PHYSICAL THERAPY | Facility: CLINIC | Age: 69
End: 2024-01-24
Payer: MEDICARE

## 2024-01-24 DIAGNOSIS — M54.50 CHRONIC MIDLINE LOW BACK PAIN, UNSPECIFIED WHETHER SCIATICA PRESENT: Primary | ICD-10-CM

## 2024-01-24 DIAGNOSIS — G89.29 CHRONIC MIDLINE LOW BACK PAIN, UNSPECIFIED WHETHER SCIATICA PRESENT: Primary | ICD-10-CM

## 2024-01-24 PROCEDURE — 97530 THERAPEUTIC ACTIVITIES: CPT | Performed by: PHYSICAL THERAPIST

## 2024-01-24 PROCEDURE — 97110 THERAPEUTIC EXERCISES: CPT | Performed by: PHYSICAL THERAPIST

## 2024-01-24 NOTE — PROGRESS NOTES
"Physical Therapy Daily Treatment Note  [address], [address] [place] [address]        Patient: Jeimy Willard   : 1955  Diagnosis/ICD-10 Code:  Chronic midline low back pain, unspecified whether sciatica present [M54.50, G89.29]  Referring practitioner: GUNJAN Malloy  Date of Initial Visit: Type: THERAPY  Noted: 2023  Today's Date: 2024  Patient seen for 3 sessions             Subjective   Jeimy Willard reports having 2/10 pain in her left leg upon arrival today.  She reports that she had to sit in \"Metal chairs\" yesterday which aggravated her pain.  She reports that her left leg has felt a little stronger in the past few days and states she is now able to get up/down off of toilet with less difficulty.    Objective     Left Lower Extremity : Knee Strength:  Grossly 4-/5    See Exercise and Manual Logs for complete treatment.       Assessment/Plan    Pt tolerated therapy session well - with progression of therapeutic exercises, CKC-Functional activities, and Manual therapy. She has improved, but continues to have deficits in Her Trunk - Core and Left Lower Extremity ROM,  Strength, and Stability; limiting functional mobility and  and ability to perform ADLs without increased pain at this time.    Progress per Plan of Care           Timed:  Manual Therapy:    6     mins  53683;  Therapeutic Exercise:    30     mins  24737;     Neuromuscular Edu:    0    mins  05300;    Therapeutic Activity:     8     mins  09586;     Gait Trainin     mins  46462;     Ultrasound:     0     mins  78934;    Electrical Stimulation:    0     mins  27894;  Iontophoresis     0     mins  49984    Untimed:  Electrical Stimulation:    0     mins  98462 ( );  Mechanical Traction:    0     mins  25679;   Fluidotherapy     0     mins  17875  Hot pack     0     mins  45928  Cold pack     0     mins  92420    Timed Treatment:   44   mins   Total Treatment:     44   mins        Alma Molina PTA  Physical " Therapist Assistant

## 2024-01-31 ENCOUNTER — TREATMENT (OUTPATIENT)
Dept: PHYSICAL THERAPY | Facility: CLINIC | Age: 69
End: 2024-01-31
Payer: MEDICARE

## 2024-01-31 DIAGNOSIS — G89.29 CHRONIC MIDLINE LOW BACK PAIN, UNSPECIFIED WHETHER SCIATICA PRESENT: Primary | ICD-10-CM

## 2024-01-31 DIAGNOSIS — M54.50 CHRONIC MIDLINE LOW BACK PAIN, UNSPECIFIED WHETHER SCIATICA PRESENT: Primary | ICD-10-CM

## 2024-01-31 PROCEDURE — 97110 THERAPEUTIC EXERCISES: CPT | Performed by: PHYSICAL THERAPIST

## 2024-01-31 PROCEDURE — 97530 THERAPEUTIC ACTIVITIES: CPT | Performed by: PHYSICAL THERAPIST

## 2024-01-31 NOTE — PROGRESS NOTES
"Physical Therapy Daily Treatment Note  [address] [facility], [address] [place] [address]        Patient: Jeimy Willard   : 1955  Diagnosis/ICD-10 Code:  Chronic midline low back pain, unspecified whether sciatica present [M54.50, G89.29]  Referring practitioner: GUNJAN Malloy  Date of Initial Visit: Type: THERAPY  Noted: 2023  Today's Date: 2024  Patient seen for 4 sessions             Subjective   Jeimy Willard reports having 3/10 pain in her lower back and left lower extremity upon arrival today.  She continues to report having \"Nerve\" pain and Hypersensitivity in her left knee and distal Lower extremity secondary to recovering \"Shingles\".    Objective     Left Knee Strength:  Grossly 4-/5    See Exercise and Manual Logs for complete treatment.       Assessment/Plan    Pt tolerated therapy session well - with progression of therapeutic exercises, CKC-Functional activities, and Manual therapy. She has improved, but continues to have deficits in Her Trunk - Core and Left Lower Extremity ROM,  Strength, and Stability; limiting functional mobility and  and ability to perform ADLs without increased pain at this time.   Pt exhibits and subjectively reports improved strength in left lower extremity during functional tasks.        Progress per Plan of Care - as tolerated.           Timed:  Manual Therapy:    6     mins  49560;  Therapeutic Exercise:    24     mins  80956;     Neuromuscular Edu:    0    mins  95187;    Therapeutic Activity:     8     mins  29320;     Gait Trainin     mins  91536;     Ultrasound:     0     mins  45520;    Electrical Stimulation:    0     mins  17777;  Iontophoresis     0     mins  58077    Untimed:  Electrical Stimulation:    0     mins  08487 ( );  Mechanical Traction:    0     mins  84543;   Fluidotherapy     0     mins  27292  Hot pack     0     mins  05177  Cold pack     0     mins  26623    Timed Treatment:   38   mins   Total Treatment:     38   " no Molina PTA  Physical Therapist Assistant

## 2024-02-01 ENCOUNTER — OFFICE VISIT (OUTPATIENT)
Dept: FAMILY MEDICINE CLINIC | Facility: CLINIC | Age: 69
End: 2024-02-01
Payer: MEDICARE

## 2024-02-01 VITALS
SYSTOLIC BLOOD PRESSURE: 116 MMHG | HEART RATE: 96 BPM | DIASTOLIC BLOOD PRESSURE: 78 MMHG | BODY MASS INDEX: 32.89 KG/M2 | TEMPERATURE: 98.2 F | WEIGHT: 185.6 LBS | HEIGHT: 63 IN | OXYGEN SATURATION: 98 %

## 2024-02-01 DIAGNOSIS — J47.0 BRONCHIECTASIS WITH ACUTE LOWER RESPIRATORY INFECTION: ICD-10-CM

## 2024-02-01 DIAGNOSIS — K21.9 GASTROESOPHAGEAL REFLUX DISEASE WITHOUT ESOPHAGITIS: ICD-10-CM

## 2024-02-01 DIAGNOSIS — B02.8 HERPES ZOSTER WITH OTHER COMPLICATION: ICD-10-CM

## 2024-02-01 DIAGNOSIS — R05.2 SUBACUTE COUGH: ICD-10-CM

## 2024-02-01 DIAGNOSIS — M54.42 ACUTE LEFT-SIDED LOW BACK PAIN WITH LEFT-SIDED SCIATICA: ICD-10-CM

## 2024-02-01 DIAGNOSIS — J22 LOWER RESPIRATORY INFECTION (E.G., BRONCHITIS, PNEUMONIA, PNEUMONITIS, PULMONITIS): ICD-10-CM

## 2024-02-01 DIAGNOSIS — M25.562 CHRONIC PAIN OF LEFT KNEE: ICD-10-CM

## 2024-02-01 DIAGNOSIS — Z79.899 LONG-TERM USE OF HIGH-RISK MEDICATION: ICD-10-CM

## 2024-02-01 DIAGNOSIS — G89.29 CHRONIC PAIN OF LEFT KNEE: ICD-10-CM

## 2024-02-01 DIAGNOSIS — F51.01 PRIMARY INSOMNIA: Primary | ICD-10-CM

## 2024-02-01 DIAGNOSIS — M05.79 RHEUMATOID ARTHRITIS INVOLVING MULTIPLE SITES WITH POSITIVE RHEUMATOID FACTOR: ICD-10-CM

## 2024-02-01 DIAGNOSIS — B02.9 HERPES ZOSTER WITHOUT COMPLICATION: ICD-10-CM

## 2024-02-01 DIAGNOSIS — I10 PRIMARY HYPERTENSION: ICD-10-CM

## 2024-02-01 DIAGNOSIS — M54.32 SCIATICA OF LEFT SIDE: ICD-10-CM

## 2024-02-01 DIAGNOSIS — Z78.0 POSTMENOPAUSAL: ICD-10-CM

## 2024-02-01 LAB
AMPHET+METHAMPHET UR QL: NEGATIVE
AMPHETAMINE INTERNAL CONTROL: NORMAL
AMPHETAMINES UR QL: NEGATIVE
BARBITURATE INTERNAL CONTROL: NORMAL
BARBITURATES UR QL SCN: NEGATIVE
BENZODIAZ UR QL SCN: NEGATIVE
BENZODIAZEPINE INTERNAL CONTROL: NORMAL
BUPRENORPHINE INTERNAL CONTROL: NORMAL
BUPRENORPHINE SERPL-MCNC: NEGATIVE NG/ML
CANNABINOIDS SERPL QL: NEGATIVE
COCAINE INTERNAL CONTROL: NORMAL
COCAINE UR QL: NEGATIVE
EXPIRATION DATE: NORMAL
Lab: NORMAL
MDMA (ECSTASY) INTERNAL CONTROL: NORMAL
MDMA UR QL SCN: NEGATIVE
METHADONE INTERNAL CONTROL: NORMAL
METHADONE UR QL SCN: NEGATIVE
METHAMPHETAMINE INTERNAL CONTROL: NORMAL
MORPHINE INTERNAL CONTROL: NORMAL
MORPHINE/OPIATES SCREEN, URINE: NEGATIVE
OXYCODONE INTERNAL CONTROL: NORMAL
OXYCODONE UR QL SCN: NEGATIVE
PCP UR QL SCN: NEGATIVE
PHENCYCLIDINE INTERNAL CONTROL: NORMAL
THC INTERNAL CONTROL: NORMAL

## 2024-02-01 PROCEDURE — 3074F SYST BP LT 130 MM HG: CPT | Performed by: NURSE PRACTITIONER

## 2024-02-01 PROCEDURE — 3078F DIAST BP <80 MM HG: CPT | Performed by: NURSE PRACTITIONER

## 2024-02-01 PROCEDURE — 99214 OFFICE O/P EST MOD 30 MIN: CPT | Performed by: NURSE PRACTITIONER

## 2024-02-01 PROCEDURE — 80305 DRUG TEST PRSMV DIR OPT OBS: CPT | Performed by: NURSE PRACTITIONER

## 2024-02-01 RX ORDER — PANTOPRAZOLE SODIUM 40 MG/1
40 TABLET, DELAYED RELEASE ORAL DAILY
Qty: 90 TABLET | Refills: 1 | Status: SHIPPED | OUTPATIENT
Start: 2024-02-01

## 2024-02-01 RX ORDER — DULOXETIN HYDROCHLORIDE 30 MG/1
30 CAPSULE, DELAYED RELEASE ORAL 2 TIMES DAILY
Qty: 60 CAPSULE | Refills: 2 | Status: SHIPPED | OUTPATIENT
Start: 2024-02-01

## 2024-02-01 RX ORDER — CELECOXIB 200 MG/1
200 CAPSULE ORAL DAILY
Qty: 90 CAPSULE | Refills: 1 | Status: SHIPPED | OUTPATIENT
Start: 2024-02-01

## 2024-02-01 RX ORDER — CALCIUM CARBONATE/VITAMIN D3 500MG-5MCG
1 TABLET ORAL 2 TIMES DAILY
Qty: 180 TABLET | Refills: 1 | Status: SHIPPED | OUTPATIENT
Start: 2024-02-01

## 2024-02-01 RX ORDER — ANTIOX #8/OM3/DHA/EPA/LUT/ZEAX 250-2.5 MG
CAPSULE ORAL
COMMUNITY
Start: 2024-01-13

## 2024-02-01 RX ORDER — FAMOTIDINE 40 MG/1
40 TABLET, FILM COATED ORAL DAILY
Qty: 90 TABLET | Refills: 2 | Status: SHIPPED | OUTPATIENT
Start: 2024-02-01

## 2024-02-01 RX ORDER — BROMPHENIRAMINE MALEATE, PSEUDOEPHEDRINE HYDROCHLORIDE, AND DEXTROMETHORPHAN HYDROBROMIDE 2; 30; 10 MG/5ML; MG/5ML; MG/5ML
5 SYRUP ORAL 4 TIMES DAILY PRN
Qty: 473 ML | Refills: 0 | Status: SHIPPED | OUTPATIENT
Start: 2024-02-01

## 2024-02-01 RX ORDER — GABAPENTIN 300 MG/1
300 CAPSULE ORAL 3 TIMES DAILY
Qty: 120 CAPSULE | Refills: 0 | Status: SHIPPED | OUTPATIENT
Start: 2024-02-01 | End: 2024-02-01 | Stop reason: SDUPTHER

## 2024-02-01 RX ORDER — ZOLPIDEM TARTRATE 6.25 MG/1
6.25 TABLET, FILM COATED, EXTENDED RELEASE ORAL NIGHTLY PRN
Qty: 90 TABLET | Refills: 0 | Status: SHIPPED | OUTPATIENT
Start: 2024-02-01

## 2024-02-01 RX ORDER — CIPROFLOXACIN 500 MG/1
500 TABLET, FILM COATED ORAL 2 TIMES DAILY
Qty: 20 TABLET | Refills: 0 | Status: SHIPPED | OUTPATIENT
Start: 2024-02-01 | End: 2024-02-11

## 2024-02-01 RX ORDER — GABAPENTIN 300 MG/1
600 CAPSULE ORAL
Start: 2024-02-01

## 2024-02-01 NOTE — PATIENT INSTRUCTIONS
Insomnia  Insomnia is a sleep disorder that makes it difficult to fall asleep or stay asleep. Insomnia can cause fatigue, low energy, difficulty concentrating, mood swings, and poor performance at work or school.  There are three different ways to classify insomnia:  Difficulty falling asleep.  Difficulty staying asleep.  Waking up too early in the morning.  Any type of insomnia can be long-term (chronic) or short-term (acute). Both are common. Short-term insomnia usually lasts for 3 months or less. Chronic insomnia occurs at least three times a week for longer than 3 months.  What are the causes?  Insomnia may be caused by another condition, situation, or substance, such as:  Having certain mental health conditions, such as anxiety and depression.  Using caffeine, alcohol, tobacco, or drugs.  Having gastrointestinal conditions, such as gastroesophageal reflux disease (GERD).  Having certain medical conditions. These include:  Asthma.  Alzheimer's disease.  Stroke.  Chronic pain.  An overactive thyroid gland (hyperthyroidism).  Other sleep disorders, such as restless legs syndrome and sleep apnea.  Menopause.  Sometimes, the cause of insomnia may not be known.  What increases the risk?  Risk factors for insomnia include:  Gender. Females are affected more often than males.  Age. Insomnia is more common as people get older.  Stress and certain medical and mental health conditions.  Lack of exercise.  Having an irregular work schedule. This may include working night shifts and traveling between different time zones.  What are the signs or symptoms?  If you have insomnia, the main symptom is having trouble falling asleep or having trouble staying asleep. This may lead to other symptoms, such as:  Feeling tired or having low energy.  Feeling nervous about going to sleep.  Not feeling rested in the morning.  Having trouble concentrating.  Feeling irritable, anxious, or depressed.  How is this diagnosed?  This condition  may be diagnosed based on:  Your symptoms and medical history. Your health care provider may ask about:  Your sleep habits.  Any medical conditions you have.  Your mental health.  A physical exam.  How is this treated?  Treatment for insomnia depends on the cause. Treatment may focus on treating an underlying condition that is causing the insomnia. Treatment may also include:  Medicines to help you sleep.  Counseling or therapy.  Lifestyle adjustments to help you sleep better.  Follow these instructions at home:  Eating and drinking    Limit or avoid alcohol, caffeinated beverages, and products that contain nicotine and tobacco, especially close to bedtime. These can disrupt your sleep.  Do not eat a large meal or eat spicy foods right before bedtime. This can lead to digestive discomfort that can make it hard for you to sleep.  Sleep habits    Keep a sleep diary to help you and your health care provider figure out what could be causing your insomnia. Write down:  When you sleep.  When you wake up during the night.  How well you sleep and how rested you feel the next day.  Any side effects of medicines you are taking.  What you eat and drink.  Make your bedroom a dark, comfortable place where it is easy to fall asleep.  Put up shades or blackout curtains to block light from outside.  Use a white noise machine to block noise.  Keep the temperature cool.  Limit screen use before bedtime. This includes:  Not watching TV.  Not using your smartphone, tablet, or computer.  Stick to a routine that includes going to bed and waking up at the same times every day and night. This can help you fall asleep faster. Consider making a quiet activity, such as reading, part of your nighttime routine.  Try to avoid taking naps during the day so that you sleep better at night.  Get out of bed if you are still awake after 15 minutes of trying to sleep. Keep the lights down, but try reading or doing a quiet activity. When you feel  sleepy, go back to bed.  General instructions  Take over-the-counter and prescription medicines only as told by your health care provider.  Exercise regularly as told by your health care provider. However, avoid exercising in the hours right before bedtime.  Use relaxation techniques to manage stress. Ask your health care provider to suggest some techniques that may work well for you. These may include:  Breathing exercises.  Routines to release muscle tension.  Visualizing peaceful scenes.  Make sure that you drive carefully. Do not drive if you feel very sleepy.  Keep all follow-up visits. This is important.  Contact a health care provider if:  You are tired throughout the day.  You have trouble in your daily routine due to sleepiness.  You continue to have sleep problems, or your sleep problems get worse.  Get help right away if:  You have thoughts about hurting yourself or someone else.  Get help right away if you feel like you may hurt yourself or others, or have thoughts about taking your own life. Go to your nearest emergency room or:  Call 911.  Call the National Suicide Prevention Lifeline at 1-740.598.3346 or 094. This is open 24 hours a day.  Text the Crisis Text Line at 185270.  Summary  Insomnia is a sleep disorder that makes it difficult to fall asleep or stay asleep.  Insomnia can be long-term (chronic) or short-term (acute).  Treatment for insomnia depends on the cause. Treatment may focus on treating an underlying condition that is causing the insomnia.  Keep a sleep diary to help you and your health care provider figure out what could be causing your insomnia.  This information is not intended to replace advice given to you by your health care provider. Make sure you discuss any questions you have with your health care provider.  Document Revised: 11/28/2022 Document Reviewed: 11/28/2022  Elsevier Patient Education © 2023 [employer]

## 2024-02-05 ENCOUNTER — HOSPITAL ENCOUNTER (OUTPATIENT)
Dept: CT IMAGING | Facility: HOSPITAL | Age: 69
Discharge: HOME | End: 2024-02-05
Admitting: INTERNAL MEDICINE
Payer: MEDICARE

## 2024-02-05 DIAGNOSIS — C88.4 EXTRANODAL MARGINAL ZONE B-CELL LYMPHOMA OF MUCOSA-ASSOCIATED LYMPHOID TISSUE (MALT-LYMPHOMA): ICD-10-CM

## 2024-02-05 DIAGNOSIS — C16.9 MALIGNANT NEOPLASM OF STOMACH, UNSPECIFIED LOCATION: ICD-10-CM

## 2024-02-05 DIAGNOSIS — R59.1 GENERALIZED ENLARGED LYMPH NODES: ICD-10-CM

## 2024-02-05 LAB
CREAT BLDA-MCNC: 1 MG/DL (ref 0.6–1.3)
EGFRCR SERPLBLD CKD-EPI 2021: 62.3 ML/MIN/1.73

## 2024-02-05 PROCEDURE — 71260 CT THORAX DX C+: CPT

## 2024-02-05 PROCEDURE — 74177 CT ABD & PELVIS W/CONTRAST: CPT

## 2024-02-05 PROCEDURE — 82565 ASSAY OF CREATININE: CPT

## 2024-02-05 PROCEDURE — 25510000001 IOPAMIDOL PER 1 ML: Performed by: INTERNAL MEDICINE

## 2024-02-05 PROCEDURE — 70491 CT SOFT TISSUE NECK W/DYE: CPT

## 2024-02-05 RX ADMIN — IOPAMIDOL 150 ML: 755 INJECTION, SOLUTION INTRAVENOUS at 09:43

## 2024-02-15 DIAGNOSIS — J47.0 BRONCHIECTASIS WITH ACUTE LOWER RESPIRATORY INFECTION: Primary | ICD-10-CM

## 2024-02-19 ENCOUNTER — CLINICAL SUPPORT (OUTPATIENT)
Dept: FAMILY MEDICINE CLINIC | Facility: CLINIC | Age: 69
End: 2024-02-19
Payer: MEDICARE

## 2024-02-19 DIAGNOSIS — J47.0 BRONCHIECTASIS WITH ACUTE LOWER RESPIRATORY INFECTION: ICD-10-CM

## 2024-02-19 PROCEDURE — 87186 SC STD MICRODIL/AGAR DIL: CPT | Performed by: NURSE PRACTITIONER

## 2024-02-19 PROCEDURE — 87077 CULTURE AEROBIC IDENTIFY: CPT | Performed by: NURSE PRACTITIONER

## 2024-02-19 PROCEDURE — 87070 CULTURE OTHR SPECIMN AEROBIC: CPT | Performed by: NURSE PRACTITIONER

## 2024-02-19 PROCEDURE — 87205 SMEAR GRAM STAIN: CPT | Performed by: NURSE PRACTITIONER

## 2024-02-20 ENCOUNTER — OFFICE VISIT (OUTPATIENT)
Dept: PULMONOLOGY | Facility: CLINIC | Age: 69
End: 2024-02-20
Payer: MEDICARE

## 2024-02-20 VITALS
OXYGEN SATURATION: 98 % | BODY MASS INDEX: 32.78 KG/M2 | SYSTOLIC BLOOD PRESSURE: 142 MMHG | RESPIRATION RATE: 18 BRPM | HEART RATE: 92 BPM | DIASTOLIC BLOOD PRESSURE: 95 MMHG | HEIGHT: 63 IN | WEIGHT: 185 LBS

## 2024-02-20 DIAGNOSIS — J47.9 BRONCHIECTASIS WITHOUT ACUTE EXACERBATION: Primary | ICD-10-CM

## 2024-02-20 DIAGNOSIS — R93.89 ABNORMAL CHEST CT: ICD-10-CM

## 2024-02-20 DIAGNOSIS — Z87.01 HISTORY OF PSEUDOMONAS PNEUMONIA: ICD-10-CM

## 2024-02-20 RX ORDER — TOBRAMYCIN INHALATION SOLUTION 300 MG/5ML
300 INHALANT RESPIRATORY (INHALATION)
Qty: 50 ML | Refills: 5 | Status: SHIPPED | OUTPATIENT
Start: 2024-02-20

## 2024-02-21 ENCOUNTER — TREATMENT (OUTPATIENT)
Dept: PHYSICAL THERAPY | Facility: CLINIC | Age: 69
End: 2024-02-21
Payer: MEDICARE

## 2024-02-21 DIAGNOSIS — M54.50 CHRONIC MIDLINE LOW BACK PAIN, UNSPECIFIED WHETHER SCIATICA PRESENT: Primary | ICD-10-CM

## 2024-02-21 DIAGNOSIS — G89.29 CHRONIC MIDLINE LOW BACK PAIN, UNSPECIFIED WHETHER SCIATICA PRESENT: Primary | ICD-10-CM

## 2024-02-21 PROCEDURE — 97530 THERAPEUTIC ACTIVITIES: CPT | Performed by: PHYSICAL THERAPIST

## 2024-02-21 PROCEDURE — 97110 THERAPEUTIC EXERCISES: CPT | Performed by: PHYSICAL THERAPIST

## 2024-02-21 NOTE — PROGRESS NOTES
Progress note  [address] [facility] [address], [address] [place] [address]      Patient: Jeimy Willard   : 1955  Diagnosis/ICD-10 Code:  Chronic midline low back pain, unspecified whether sciatica present [M54.50, G89.29]  Referring practitioner: GUNJAN Malloy  Date of Initial Visit: Type: THERAPY  Noted: 2023  Today's Date: 2024  Patient seen for 5 sessions        Subjective:   Jeimy Willard reports: 60% improvement over the past month.  Pt reports that she still has LBP and L LE radicular pain to her knee.  Pt reports 5/10 pain at beginning of session today.  Pt reports that she continues to have increased pain with prolonged positioning (sitting and standing) and walking activities.  Subjective Questionnaire: Oswestry:       Subjective   Objective   Active Range of Motion      Additional Active Range of Motion Details  Lumbar AROM:   Flexion: 25% limited  Extension: WFL (pain)  Rotation: 75% limited R and L (pain)  Lateral flexion: 75% limited bilaterally (pain)        Strength/Myotome Testing      Left Hip   Planes of Motion   Flexion: 4  Extension: 4  Abduction: 4     Right Hip   Planes of Motion   Flexion: 4  Extension: 4  Abduction: 4     Left Knee   Flexion: 4+  Extension: 4+     Right Knee   Flexion: 4+  Extension: 4+     Left Ankle/Foot   Dorsiflexion: 5     Right Ankle/Foot   Dorsiflexion: 5  Assessment & Plan       Assessment  Impairments: abnormal or restricted ROM, activity intolerance, impaired physical strength, lacks appropriate home exercise program and pain with function   Functional limitations: lifting, sleeping, walking, uncomfortable because of pain, sitting, standing and unable to perform repetitive tasks   Assessment details: Compared to initial evaluation the patient demonstrates improvements in L hip flexion, bilateral hip abduction/extension and L knee strength.  Pt also reports improvements in overall pain and function during ADLs as shown on the GENESIS.  Pt however continues to  present with signs/symptoms consistent with lower back pain with L LE radiculopathy including decreased lumbar ROM, bilateral hip/core weakness and reports of pain/radicular symptoms limiting function during ADLs as shown on the GENESIS.  Patient would continue to benefit from skilled PT services in order to address deficits limiting function at this time.      Goals  Plan Goals: 1. The patient complains of low back/ L LE radicular pain.  LTG 1: 12 weeks:  The patient will report a pain rating of 1/10 or better in order to improve  tolerance to activities of daily living and improve sleep quality.  STATUS:  ongoing  STG 1a: 6 weeks:  The patient will report a pain rating of 3/10 or better.  STATUS:  not met, continue    2. The patient demonstrates weakness of the bilateral hips.  LTG 2: 12 weeks:  The patient will demonstrate 4+ /5 strength for bilateral hip flexion, abduction,  and extension in order to improve hip stability.  STATUS:  ongoing  STG 2a: 6 weeks:  The patient will demonstrate 4 /5 strength for bilateral hip flexion, abduction,  and extension.  STATUS:  met    3. The patient has gait dysfunction.  LTG 3: 12 weeks:  The patient will ambulate without assistive device, independently, for   community distances with minimal limp in order to improve mobility and allow   patient to perform activities such as grocery shopping with greater ease.  STATUS:  ongoing    4. Mobility: Walking/Moving Around Functional Limitation    LTG 1: 12 weeks:  The patient will demonstrate 16 % limitation by achieving a score of 8/50 on the GENESIS.  STATUS:  ongoing  LTG 1: 12 weeks:  The patient will demonstrate 30 % limitation by achieving a score of 15/50 on the GENESIS.  STATUS:  met      Plan  Therapy options: will be seen for skilled therapy services  Planned modality interventions: thermotherapy (hydrocollator packs) and cryotherapy  Planned therapy interventions: manual therapy, abdominal trunk stabilization, ADL retraining,  neuromuscular re-education, body mechanics training, postural training, soft tissue mobilization, flexibility, spinal/joint mobilization, functional ROM exercises, strengthening, stretching, gait training, home exercise program, therapeutic activities and joint mobilization  Frequency: 2x week  Duration in weeks: 8  Treatment plan discussed with: patient      Progress toward previous goals: Partially Met      Recommendations: Continue as planned  Timeframe: 1 month  Prognosis to achieve goals: good    PT SIGNATURE: Nehal Avery, PT     Electronically signed 2024  DATE TREATMENT INITIATED: 2024  [place] License: [id number]      Based upon review of the patient's progress and continued therapy plan, it is my medical opinion that Jeimy Willard should continue physical therapy treatment at [facility] PHYSICAL THERAPY  [address] [place] [id number] [phone].      Timed:  Manual Therapy:    5     mins  19010;  Therapeutic Exercise:    10     mins  69248;     Neuromuscular Edu:    0    mins  65782;    Therapeutic Activity:     8     mins  00614;     Gait Trainin     mins  49032;     Ultrasound:     0     mins  44252;    Electrical Stimulation:    0     mins  34907 ( );    Untimed:  Electrical Stimulation:    0     mins  61921 ( );  Mechanical Traction:    0     mins  38987;     Timed Treatment:   23   mins   Total Treatment:     40   mins

## 2024-02-23 LAB
BACTERIA SPEC RESP CULT: ABNORMAL
BACTERIA SPEC RESP CULT: ABNORMAL
GRAM STN SPEC: ABNORMAL

## 2024-02-26 ENCOUNTER — PATIENT ROUNDING (BHMG ONLY) (OUTPATIENT)
Dept: PULMONOLOGY | Facility: CLINIC | Age: 69
End: 2024-02-26
Payer: MEDICARE

## 2024-02-26 NOTE — PROGRESS NOTES
February 26, 2024    Hello, may I speak with Jeimy Willard?    My name is Shabnam      [facility] am  [email or web address]  [address]  [address]  [facility] [address] [phone].    Before we get started may I verify your date of birth? 1955    I am calling to officially welcome you to our practice and ask about your recent visit. Is this a good time to talk? My chart message sent for patient rounding.

## 2024-03-13 ENCOUNTER — TREATMENT (OUTPATIENT)
Dept: PHYSICAL THERAPY | Facility: CLINIC | Age: 69
End: 2024-03-13
Payer: MEDICARE

## 2024-03-13 DIAGNOSIS — M54.50 CHRONIC MIDLINE LOW BACK PAIN, UNSPECIFIED WHETHER SCIATICA PRESENT: Primary | ICD-10-CM

## 2024-03-13 DIAGNOSIS — G89.29 CHRONIC MIDLINE LOW BACK PAIN, UNSPECIFIED WHETHER SCIATICA PRESENT: Primary | ICD-10-CM

## 2024-03-13 PROCEDURE — 97110 THERAPEUTIC EXERCISES: CPT | Performed by: PHYSICAL THERAPIST

## 2024-03-13 PROCEDURE — 97530 THERAPEUTIC ACTIVITIES: CPT | Performed by: PHYSICAL THERAPIST

## 2024-03-13 NOTE — PROGRESS NOTES
Physical Therapy Daily Treatment Note  [address] [facility], [address], [place] [address]      Patient: Jeimy Willard   : 1955  Diagnosis/ICD-10 Code:  Chronic midline low back pain, unspecified whether sciatica present [M54.50, G89.29]  Referring practitioner: GUNJAN Malloy  Date of Initial Visit: Type: THERAPY  Noted: 2023  Today's Date: 3/13/2024  Patient seen for 6 sessions           Subjective   Jeimy Willard reports: no pain at beginning of session today.  Pt reports that in general pain/function have improved.  Pt reports that she carries her cane but does not use it consistently.  Pt reports that at worst her pain has been a 1/10 in lower back, she reports occasional L LE radicular pain.  Pt reports that due to significant improvements in pain/difficulty during ADLs she is comfortable with D/C at this time.        Objective    GENESIS:     Strength/Myotome Testing      Left Hip   Planes of Motion   Flexion: 4+  Extension: 4+  Abduction: 4+     Right Hip   Planes of Motion   Flexion: 4+  Extension: 4+  Abduction: 4+     Left Knee   Flexion: 5  Extension: 5     Right Knee   Flexion: 5  Extension: 5     Left Ankle/Foot   Dorsiflexion: 5     Right Ankle/Foot   Dorsiflexion: 5  See Exercise, Manual, and Modality Logs for complete treatment.       Assessment & Plan       Assessment  Assessment details: Pt demonstrates improvements in bilateral hip and core strength and reports improvements in overall pain and function during ADLs.  Due to meeting most PT goals and significant improvements in overall function the pt is comfortable with discharge at this time.  Pt is to continue HEP.      Goals  Plan Goals: 1. The patient complains of low back/ L LE radicular pain.  LTG 1: 12 weeks:  The patient will report a pain rating of 1/10 or better in order to improve  tolerance to activities of daily living and improve sleep quality.  STATUS:  met  STG 1a: 6 weeks:  The patient will report a pain rating of 3/10  or better.  STATUS:  met     2. The patient demonstrates weakness of the bilateral hips.  LTG 2: 12 weeks:  The patient will demonstrate 4+ /5 strength for bilateral hip flexion, abduction,  and extension in order to improve hip stability.  STATUS:  met  STG 2a: 6 weeks:  The patient will demonstrate 4 /5 strength for bilateral hip flexion, abduction,  and extension.  STATUS:  met     3. The patient has gait dysfunction.  LTG 3: 12 weeks:  The patient will ambulate without assistive device, independently, for   community distances with minimal limp in order to improve mobility and allow   patient to perform activities such as grocery shopping with greater ease.  STATUS:  not met consistently     4. Mobility: Walking/Moving Around Functional Limitation                   LTG 1: 12 weeks:  The patient will demonstrate 16 % limitation by achieving a score of 8/50 on the GENESIS.  STATUS:  met  LTG 1: 12 weeks:  The patient will demonstrate 30 % limitation by achieving a score of 15/50 on the GENESIS.  STATUS:  met    Plan  Therapy options: will not be seen for skilled therapy services             Timed:  Manual Therapy:    0     mins  77204;  Therapeutic Exercise:    30     mins  25058;     Neuromuscular Edu:    0    mins  83563;    Therapeutic Activity:     8     mins  29922;     Gait Trainin     mins  66976;     Ultrasound:     0     mins  60446;    Electrical Stimulation:    0     mins  75516;  Iontophoresis     0     mins  67321    Untimed:  Electrical Stimulation:    0     mins  72276 ( );  Mechanical Traction:    0     mins  99063;   Fluidotherapy     0     mins  92202  Hot pack     0     Mins    Cold pack                       0     Mins     Timed Treatment:   38   mins   Total Treatment:     45   mins        Nehal Avery PT    Electronically signed [unfilled]  [place] License: [id number]  NPI number: [id number]

## 2024-03-26 ENCOUNTER — HOSPITAL ENCOUNTER (OUTPATIENT)
Dept: RESPIRATORY THERAPY | Facility: HOSPITAL | Age: 69
Discharge: HOME | End: 2024-03-26
Admitting: STUDENT IN AN ORGANIZED HEALTH CARE EDUCATION/TRAINING PROGRAM
Payer: MEDICARE

## 2024-03-26 DIAGNOSIS — J47.9 BRONCHIECTASIS WITHOUT ACUTE EXACERBATION: ICD-10-CM

## 2024-03-26 DIAGNOSIS — Z87.01 HISTORY OF PSEUDOMONAS PNEUMONIA: ICD-10-CM

## 2024-03-26 PROCEDURE — 94060 EVALUATION OF WHEEZING: CPT

## 2024-03-26 PROCEDURE — 94726 PLETHYSMOGRAPHY LUNG VOLUMES: CPT

## 2024-03-26 PROCEDURE — 94729 DIFFUSING CAPACITY: CPT

## 2024-03-26 RX ORDER — ALBUTEROL SULFATE 2.5 MG/3ML
2.5 SOLUTION RESPIRATORY (INHALATION) ONCE
Status: COMPLETED | OUTPATIENT
Start: 2024-03-26 | End: 2024-03-26

## 2024-03-26 RX ADMIN — ALBUTEROL SULFATE 2.5 MG: 2.5 SOLUTION RESPIRATORY (INHALATION) at 08:47

## 2024-03-27 PROCEDURE — 94060 EVALUATION OF WHEEZING: CPT | Performed by: STUDENT IN AN ORGANIZED HEALTH CARE EDUCATION/TRAINING PROGRAM

## 2024-03-27 PROCEDURE — 94726 PLETHYSMOGRAPHY LUNG VOLUMES: CPT | Performed by: STUDENT IN AN ORGANIZED HEALTH CARE EDUCATION/TRAINING PROGRAM

## 2024-03-27 PROCEDURE — 94729 DIFFUSING CAPACITY: CPT | Performed by: STUDENT IN AN ORGANIZED HEALTH CARE EDUCATION/TRAINING PROGRAM

## 2024-05-06 ENCOUNTER — OFFICE VISIT (OUTPATIENT)
Dept: FAMILY MEDICINE CLINIC | Facility: CLINIC | Age: 69
End: 2024-05-06
Payer: MEDICARE

## 2024-05-06 VITALS
HEART RATE: 88 BPM | SYSTOLIC BLOOD PRESSURE: 136 MMHG | BODY MASS INDEX: 31.89 KG/M2 | HEIGHT: 63 IN | OXYGEN SATURATION: 100 % | DIASTOLIC BLOOD PRESSURE: 86 MMHG | TEMPERATURE: 97.5 F | WEIGHT: 180 LBS

## 2024-05-06 DIAGNOSIS — F51.01 PRIMARY INSOMNIA: ICD-10-CM

## 2024-05-06 DIAGNOSIS — R05.2 SUBACUTE COUGH: ICD-10-CM

## 2024-05-06 DIAGNOSIS — Z13.29 SCREENING FOR THYROID DISORDER: ICD-10-CM

## 2024-05-06 DIAGNOSIS — B02.29 POST HERPETIC NEURALGIA: Primary | ICD-10-CM

## 2024-05-06 DIAGNOSIS — Z78.0 POSTMENOPAUSE: ICD-10-CM

## 2024-05-06 DIAGNOSIS — Z13.6 ENCOUNTER FOR SCREENING FOR CARDIOVASCULAR DISORDERS: ICD-10-CM

## 2024-05-06 DIAGNOSIS — M54.42 ACUTE LEFT-SIDED LOW BACK PAIN WITH LEFT-SIDED SCIATICA: ICD-10-CM

## 2024-05-06 DIAGNOSIS — M54.32 SCIATICA OF LEFT SIDE: ICD-10-CM

## 2024-05-06 DIAGNOSIS — Z11.59 NEED FOR HEPATITIS C SCREENING TEST: ICD-10-CM

## 2024-05-06 DIAGNOSIS — J47.0 BRONCHIECTASIS WITH ACUTE LOWER RESPIRATORY INFECTION: ICD-10-CM

## 2024-05-06 DIAGNOSIS — B02.8 HERPES ZOSTER WITH OTHER COMPLICATION: ICD-10-CM

## 2024-05-06 PROCEDURE — 1160F RVW MEDS BY RX/DR IN RCRD: CPT | Performed by: NURSE PRACTITIONER

## 2024-05-06 PROCEDURE — 1159F MED LIST DOCD IN RCRD: CPT | Performed by: NURSE PRACTITIONER

## 2024-05-06 PROCEDURE — 3079F DIAST BP 80-89 MM HG: CPT | Performed by: NURSE PRACTITIONER

## 2024-05-06 PROCEDURE — 3075F SYST BP GE 130 - 139MM HG: CPT | Performed by: NURSE PRACTITIONER

## 2024-05-06 PROCEDURE — 99214 OFFICE O/P EST MOD 30 MIN: CPT | Performed by: NURSE PRACTITIONER

## 2024-05-06 RX ORDER — GABAPENTIN 300 MG/1
300 CAPSULE ORAL
Qty: 270 CAPSULE | Refills: 1 | Status: SHIPPED | OUTPATIENT
Start: 2024-05-06

## 2024-05-06 RX ORDER — ZOLPIDEM TARTRATE 6.25 MG/1
6.25 TABLET, FILM COATED, EXTENDED RELEASE ORAL NIGHTLY PRN
Qty: 90 TABLET | Refills: 0 | Status: SHIPPED | OUTPATIENT
Start: 2024-05-06

## 2024-05-06 RX ORDER — DULOXETIN HYDROCHLORIDE 30 MG/1
30 CAPSULE, DELAYED RELEASE ORAL 2 TIMES DAILY
Qty: 60 CAPSULE | Refills: 2 | Status: SHIPPED | OUTPATIENT
Start: 2024-05-06

## 2024-05-06 RX ORDER — ALBUTEROL SULFATE 2.5 MG/3ML
2.5 SOLUTION RESPIRATORY (INHALATION) EVERY 4 HOURS PRN
Qty: 90 EACH | Refills: 2 | Status: SHIPPED | OUTPATIENT
Start: 2024-05-06

## 2024-05-06 RX ORDER — BROMPHENIRAMINE MALEATE, PSEUDOEPHEDRINE HYDROCHLORIDE, AND DEXTROMETHORPHAN HYDROBROMIDE 2; 30; 10 MG/5ML; MG/5ML; MG/5ML
5 SYRUP ORAL 4 TIMES DAILY PRN
Qty: 473 ML | Refills: 0 | Status: SHIPPED | OUTPATIENT
Start: 2024-05-06

## 2024-06-03 ENCOUNTER — HOSPITAL ENCOUNTER (OUTPATIENT)
Dept: BONE DENSITY | Facility: HOSPITAL | Age: 69
Discharge: HOME | End: 2024-06-03
Payer: MEDICARE

## 2024-06-03 DIAGNOSIS — Z78.0 POSTMENOPAUSE: ICD-10-CM

## 2024-06-03 PROCEDURE — 77080 DXA BONE DENSITY AXIAL: CPT

## 2024-06-28 ENCOUNTER — LAB (OUTPATIENT)
Dept: LAB | Facility: HOSPITAL | Age: 69
End: 2024-06-28
Payer: MEDICARE

## 2024-06-28 DIAGNOSIS — Z13.6 ENCOUNTER FOR SCREENING FOR CARDIOVASCULAR DISORDERS: ICD-10-CM

## 2024-06-28 DIAGNOSIS — Z11.59 NEED FOR HEPATITIS C SCREENING TEST: ICD-10-CM

## 2024-06-28 DIAGNOSIS — Z13.29 SCREENING FOR THYROID DISORDER: ICD-10-CM

## 2024-06-28 DIAGNOSIS — B02.29 POST HERPETIC NEURALGIA: ICD-10-CM

## 2024-06-28 LAB
ALBUMIN SERPL-MCNC: 3.8 G/DL (ref 3.5–5.2)
ALBUMIN/GLOB SERPL: 1.1 G/DL
ALP SERPL-CCNC: 85 U/L (ref 39–117)
ALT SERPL W P-5'-P-CCNC: 29 U/L (ref 1–33)
ANION GAP SERPL CALCULATED.3IONS-SCNC: 9.6 MMOL/L (ref 5–15)
AST SERPL-CCNC: 36 U/L (ref 1–32)
BILIRUB SERPL-MCNC: 0.4 MG/DL (ref 0–1.2)
BUN SERPL-MCNC: 24 MG/DL (ref 8–23)
BUN/CREAT SERPL: 18 (ref 7–25)
CALCIUM SPEC-SCNC: 9.1 MG/DL (ref 8.6–10.5)
CHLORIDE SERPL-SCNC: 103 MMOL/L (ref 98–107)
CHOLEST SERPL-MCNC: 168 MG/DL (ref 0–200)
CO2 SERPL-SCNC: 24.4 MMOL/L (ref 22–29)
CREAT SERPL-MCNC: 1.33 MG/DL (ref 0.57–1)
DEPRECATED RDW RBC AUTO: 43.3 FL (ref 37–54)
EGFRCR SERPLBLD CKD-EPI 2021: 43.9 ML/MIN/1.73
ERYTHROCYTE [DISTWIDTH] IN BLOOD BY AUTOMATED COUNT: 13.8 % (ref 12.3–15.4)
GLOBULIN UR ELPH-MCNC: 3.6 GM/DL
GLUCOSE SERPL-MCNC: 86 MG/DL (ref 65–99)
HCT VFR BLD AUTO: 35.9 % (ref 34–46.6)
HCV AB SER QL: NORMAL
HDLC SERPL-MCNC: 83 MG/DL (ref 40–60)
HGB BLD-MCNC: 11.3 G/DL (ref 12–15.9)
LDLC SERPL CALC-MCNC: 70 MG/DL (ref 0–100)
LDLC/HDLC SERPL: 0.83 {RATIO}
MCH RBC QN AUTO: 27.2 PG (ref 26.6–33)
MCHC RBC AUTO-ENTMCNC: 31.5 G/DL (ref 31.5–35.7)
MCV RBC AUTO: 86.5 FL (ref 79–97)
PLATELET # BLD AUTO: 294 10*3/MM3 (ref 140–450)
PMV BLD AUTO: 10.3 FL (ref 6–12)
POTASSIUM SERPL-SCNC: 4.3 MMOL/L (ref 3.5–5.2)
PROT SERPL-MCNC: 7.4 G/DL (ref 6–8.5)
RBC # BLD AUTO: 4.15 10*6/MM3 (ref 3.77–5.28)
SODIUM SERPL-SCNC: 137 MMOL/L (ref 136–145)
TRIGL SERPL-MCNC: 81 MG/DL (ref 0–150)
TSH SERPL DL<=0.05 MIU/L-ACNC: 4.3 UIU/ML (ref 0.27–4.2)
VLDLC SERPL-MCNC: 15 MG/DL (ref 5–40)
WBC NRBC COR # BLD AUTO: 6.24 10*3/MM3 (ref 3.4–10.8)

## 2024-06-28 PROCEDURE — 80053 COMPREHEN METABOLIC PANEL: CPT

## 2024-06-28 PROCEDURE — 86803 HEPATITIS C AB TEST: CPT

## 2024-06-28 PROCEDURE — 84443 ASSAY THYROID STIM HORMONE: CPT

## 2024-06-28 PROCEDURE — 85027 COMPLETE CBC AUTOMATED: CPT

## 2024-06-28 PROCEDURE — 36415 COLL VENOUS BLD VENIPUNCTURE: CPT

## 2024-06-28 PROCEDURE — 80061 LIPID PANEL: CPT

## 2024-07-01 DIAGNOSIS — N28.9 ABNORMAL RENAL FUNCTION: ICD-10-CM

## 2024-07-01 DIAGNOSIS — E03.9 HYPOTHYROIDISM, UNSPECIFIED TYPE: Primary | ICD-10-CM

## 2024-07-01 RX ORDER — LEVOTHYROXINE SODIUM 0.05 MG/1
50 TABLET ORAL
Qty: 60 TABLET | Refills: 0 | Status: SHIPPED | OUTPATIENT
Start: 2024-07-01

## 2024-07-17 ENCOUNTER — LAB (OUTPATIENT)
Dept: LAB | Facility: HOSPITAL | Age: 69
End: 2024-07-17
Payer: MEDICARE

## 2024-07-17 DIAGNOSIS — N28.9 ABNORMAL RENAL FUNCTION: ICD-10-CM

## 2024-07-17 DIAGNOSIS — E03.9 HYPOTHYROIDISM, UNSPECIFIED TYPE: ICD-10-CM

## 2024-07-17 LAB
ANION GAP SERPL CALCULATED.3IONS-SCNC: 7.8 MMOL/L (ref 5–15)
BUN SERPL-MCNC: 23 MG/DL (ref 8–23)
BUN/CREAT SERPL: 18.9 (ref 7–25)
CALCIUM SPEC-SCNC: 9.2 MG/DL (ref 8.6–10.5)
CHLORIDE SERPL-SCNC: 102 MMOL/L (ref 98–107)
CO2 SERPL-SCNC: 25.2 MMOL/L (ref 22–29)
CREAT SERPL-MCNC: 1.22 MG/DL (ref 0.57–1)
EGFRCR SERPLBLD CKD-EPI 2021: 48.7 ML/MIN/1.73
GLUCOSE SERPL-MCNC: 68 MG/DL (ref 65–99)
POTASSIUM SERPL-SCNC: 4.3 MMOL/L (ref 3.5–5.2)
SODIUM SERPL-SCNC: 135 MMOL/L (ref 136–145)
T4 FREE SERPL-MCNC: 1.41 NG/DL (ref 0.92–1.68)
TSH SERPL DL<=0.05 MIU/L-ACNC: 2.89 UIU/ML (ref 0.27–4.2)

## 2024-07-17 PROCEDURE — 84443 ASSAY THYROID STIM HORMONE: CPT

## 2024-07-17 PROCEDURE — 36415 COLL VENOUS BLD VENIPUNCTURE: CPT

## 2024-07-17 PROCEDURE — 84439 ASSAY OF FREE THYROXINE: CPT

## 2024-07-17 PROCEDURE — 80048 BASIC METABOLIC PNL TOTAL CA: CPT

## 2024-07-25 NOTE — PROGRESS NOTES
Subjective   The ABCs of the Annual Wellness Visit  Medicare Wellness Visit      Jeimy Willard is a 69 y.o. patient who presents for a Medicare Wellness Visit.    The following portions of the patient's history were reviewed and   updated as appropriate: allergies, current medications, past family history, past medical history, past social history, past surgical history, and problem list.    Compared to one year ago, the patient's physical   health is better.  Compared to one year ago, the patient's mental   health is the same.    Recent Hospitalizations:  She was not admitted to the hospital during the last year.     Current Medical Providers:  Patient Care Team:  Jeaneth Barker APRN as PCP - General (Nurse Practitioner)    Outpatient Medications Prior to Visit   Medication Sig Dispense Refill    acetaminophen (TYLENOL) 325 MG tablet Take 2 tablets by mouth Every 6 (Six) Hours As Needed.      albuterol (PROVENTIL) (2.5 MG/3ML) 0.083% nebulizer solution Take 2.5 mg by nebulization Every 4 (Four) Hours As Needed for Wheezing. 90 each 2    azelastine (ASTELIN) 0.1 % nasal spray       cetirizine (zyrTEC) 10 MG tablet       cyanocobalamin 1000 MCG/ML injection       diphenhydrAMINE (BENADRYL) 25 mg capsule Take 1 capsule by mouth Every 6 (Six) Hours As Needed.      fluticasone (FLONASE) 50 MCG/ACT nasal spray       Iron-Vitamin C (Vitron-C)  MG tablet       lisinopril (PRINIVIL,ZESTRIL) 10 MG tablet Take 0.5 tablets by mouth Daily.      multivitamins-minerals (PRESERVISION AREDS 2) capsule capsule       olopatadine (PATANOL) 0.1 % ophthalmic solution 1 drop.      Premarin 0.625 MG/GM vaginal cream Insert 2 g into the vagina As Needed (Vaginal irritation). 30 g 2    Pyridoxine HCl (VITAMIN B-6 PO)       tobramycin PF (David) 300 MG/5ML nebulizer solution Take 5 mL by nebulization 2 (Two) Times a Day. Use daily for 1 month on, one month off. 50 mL 5    VITAMIN D, CHOLECALCIFEROL, PO Take 1 tablet by mouth Daily.       brompheniramine-pseudoephedrine-DM 30-2-10 MG/5ML syrup Take 5 mL by mouth 4 (Four) Times a Day As Needed for Allergies. 473 mL 0    Calcium Carb-Cholecalciferol (Os-Nick Calcium + D3) 500-5 MG-MCG tablet per tablet Take 1 tablet by mouth 2 (Two) Times a Day. 180 tablet 1    celecoxib (CeleBREX) 200 MG capsule Take 1 capsule by mouth Daily. 90 capsule 1    Diclofenac Sodium (VOLTAREN) 1 % gel gel Apply 2 g topically to the appropriate area as directed 4 (Four) Times a Day As Needed (knee pain). 100 g 1    DULoxetine (CYMBALTA) 30 MG capsule Take 1 capsule by mouth 2 (Two) Times a Day. 60 capsule 2    famotidine (PEPCID) 40 MG tablet Take 1 tablet by mouth Daily. 90 tablet 2    folic acid (FOLVITE) 1 MG tablet Take 1 tablet by mouth Daily. 90 tablet 3    gabapentin (NEURONTIN) 300 MG capsule Take 1 capsule by mouth every night at bedtime. 270 capsule 1    levothyroxine (Synthroid) 50 MCG tablet Take 1 tablet by mouth Every Morning. 60 tablet 0    pantoprazole (PROTONIX) 40 MG EC tablet Take 1 tablet by mouth Daily. 90 tablet 1    zolpidem CR (AMBIEN CR) 6.25 MG CR tablet Take 1 tablet by mouth At Night As Needed for Sleep. 90 tablet 0    cyclobenzaprine (FLEXERIL) 10 MG tablet Take 1 tablet by mouth 3 (Three) Times a Day As Needed for Muscle Spasms. (Patient not taking: Reported on 5/6/2024) 30 tablet 2    EPINEPHrine (EPIPEN) 0.3 MG/0.3ML solution auto-injector injection  (Patient not taking: Reported on 5/6/2024)       Facility-Administered Medications Prior to Visit   Medication Dose Route Frequency Provider Last Rate Last Admin    albuterol (PROVENTIL) nebulizer solution 0.042% 1.25 mg/3mL  1.25 mg Nebulization Q6H PRN Marzena Lim, GUNJAN         No opioid medication identified on active medication list. I have reviewed chart for other potential  high risk medication/s and harmful drug interactions in the elderly.      Aspirin is not on active medication list.  Aspirin use is not indicated based on review  "of current medical condition/s. Risk of harm outweighs potential benefits.  .    Patient Active Problem List   Diagnosis    Anemia    Arthritis    Cancer    GERD (gastroesophageal reflux disease)    Stomach disorder    Hypertension    Insomnia    Osteoporosis    Seasonal allergic rhinitis    Ulcerative lesion    Vitamin D deficiency    Lymphoma    Aftercare following surgery of Left Total Knee Arthroplasty, 11/4/2020    Rheumatoid arthritis involving multiple sites with positive rheumatoid factor    Menopausal disorder    S/P TKR (total knee replacement), left    Lymphadenopathy    Acute left-sided low back pain with left-sided sciatica    Herpes zoster without complication    History of Pseudomonas pneumonia    Bronchiectasis without acute exacerbation    Abnormal chest CT     Advance Care Planning (Click this link to access ACP Navigator)  Advance Directive is on file.  ACP discussion was held with the patient during this visit. Patient has an advance directive in EMR which is still valid.         Objective   Vitals:    08/01/24 1121   BP: 138/84   Pulse: 84   Temp: 98.5 °F (36.9 °C)   TempSrc: Temporal   SpO2: 95%   Weight: 80.6 kg (177 lb 9.6 oz)   Height: 160 cm (62.99\")       Estimated body mass index is 31.47 kg/m² as calculated from the following:    Height as of this encounter: 160 cm (62.99\").    Weight as of this encounter: 80.6 kg (177 lb 9.6 oz).             Does the patient have evidence of cognitive impairment? No  Lab Results   Component Value Date    TRIG 81 06/28/2024    HDL 83 (H) 06/28/2024    LDL 70 06/28/2024    VLDL 15 06/28/2024                                                                                                Health  Risk Assessment    Smoking Status:  Social History     Tobacco Use   Smoking Status Never    Passive exposure: Never   Smokeless Tobacco Never     Alcohol Consumption:  Social History     Substance and Sexual Activity   Alcohol Use Never     Fall Risk Screen:  DIONADI " Fall Risk Assessment was completed, and patient is at MODERATE risk for falls. Assessment completed on:2024    Depression Screenin/1/2024    11:31 AM   PHQ-2/PHQ-9 Depression Screening   Little Interest or Pleasure in Doing Things 0-->not at all   Feeling Down, Depressed or Hopeless 0-->not at all   PHQ-9: Brief Depression Severity Measure Score 0     Health Habits and Functional and Cognitive Screenin/1/2024    11:29 AM   Functional & Cognitive Status   Do you have difficulty preparing food and eating? No   Do you have difficulty bathing yourself, getting dressed or grooming yourself? No   Do you have difficulty using the toilet? No   Do you have difficulty moving around from place to place? No   Do you have trouble with steps or getting out of a bed or a chair? Yes   Current Diet Well Balanced Diet   Dental Exam Up to date   Eye Exam Up to date   Exercise (times per week) 2 times per week   Current Exercises Include Walking   Do you need help using the phone?  No   Are you deaf or do you have serious difficulty hearing?  No   Do you need help to go to places out of walking distance? No   Do you need help shopping? No   Do you need help preparing meals?  No   Do you need help with housework?  No   Do you need help with laundry? No   Do you need help taking your medications? No   Do you need help managing money? No   Do you ever drive or ride in a car without wearing a seat belt? No   Have you felt unusual stress, anger or loneliness in the last month? No   Who do you live with? Spouse   If you need help, do you have trouble finding someone available to you? No   Have you been bothered in the last four weeks by sexual problems? No   Do you have difficulty concentrating, remembering or making decisions? No             Age-appropriate Screening Schedule:  Refer to the list below for future screening recommendations based on patient's age, sex and/or medical conditions. Orders for these  recommended tests are listed in the plan section. The patient has been provided with a written plan.    Health Maintenance List  Health Maintenance   Topic Date Due    TDAP/TD VACCINES (1 - Tdap) Never done    COVID-19 Vaccine (5 - 2023-24 season) 09/01/2023    COLORECTAL CANCER SCREENING  02/20/2024    INFLUENZA VACCINE  08/01/2024    BMI FOLLOWUP  10/04/2024    ANNUAL WELLNESS VISIT  08/01/2025    MAMMOGRAM  09/27/2025    DXA SCAN  06/03/2026    HEPATITIS C SCREENING  Completed    Pneumococcal Vaccine 65+  Completed    ZOSTER VACCINE  Completed                                                                                                                                                CMS Preventative Services Quick Reference  Risk Factors Identified During Encounter  Immunizations Discussed/Encouraged: Influenza    The above risks/problems have been discussed with the patient.  Pertinent information has been shared with the patient in the After Visit Summary.  An After Visit Summary and PPPS were made available to the patient.    Follow Up:   Next Medicare Wellness visit to be scheduled in 1 year.         Additional E&M Note during same encounter follows:  Patient has additional, significant, and separately identifiable condition(s)/problem(s) that require work above and beyond the Medicare Wellness Visit     Chief Complaint  Medicare Wellness-subsequent, Insomnia (Follow up), and Neuralgia (Would like refill on compound cream for nerve pain)    Subjective    HPI  Jeimy is also being seen today for additional medical problem/s.       The patient presents for an annual wellness visit.    She reports an improvement in her physical health compared to the previous year, and her mental health has remained the same. She was not hospitalized last year and does not take aspirin. She has an advanced directive in place. Her dental and vision screenings are up to date, with her last dental check-up being two months ago.  "    Her energy levels are generally good.  Patient reports compliance with her levothyroxine every morning.    Patient has noted slight decrease in her energy around noon and she is considering a lower dose of gabapentin, currently at 300 mg at bedtime. She also takes Cymbalta 30 mg twice a day, which she finds beneficial. She applies a cream at night, which she finds helpful.     She discontinued Celebrex due to creatinine issues.     Her reflux is well-managed.    She sleeps well, feeling rested upon waking.  Patient reports taking Ambien as directed and denies any medication side effects.          Objective   Vital Signs:  /84   Pulse 84   Temp 98.5 °F (36.9 °C) (Temporal)   Ht 160 cm (62.99\")   Wt 80.6 kg (177 lb 9.6 oz)   SpO2 95%   BMI 31.47 kg/m²   Physical Exam  Vitals reviewed.   Constitutional:       Appearance: Normal appearance. She is well-developed.   HENT:      Head: Normocephalic and atraumatic.   Eyes:      Conjunctiva/sclera: Conjunctivae normal.      Pupils: Pupils are equal, round, and reactive to light.   Cardiovascular:      Rate and Rhythm: Normal rate and regular rhythm.      Heart sounds: Normal heart sounds. No murmur heard.  Pulmonary:      Effort: Pulmonary effort is normal.      Breath sounds: Normal breath sounds. No wheezing or rhonchi.   Abdominal:      General: Bowel sounds are normal. There is no distension.      Palpations: Abdomen is soft.      Tenderness: There is no abdominal tenderness.   Skin:     General: Skin is warm and dry.   Neurological:      Mental Status: She is alert and oriented to person, place, and time.      Gait: Gait abnormal.      Comments: Cane assisted   Psychiatric:         Mood and Affect: Mood and affect normal.         Behavior: Behavior normal.         Thought Content: Thought content normal.         Judgment: Judgment normal.               The following data was reviewed by: GUNJAN Malloy on 08/01/2024:         Assessment and Plan " Additional age appropriate preventative wellness advice topics were discussed during today's preventative wellness exam(some topics already addressed during AWV portion of the note above):    Physical Activity: Advised cardiovascular activity 150 minutes per week as tolerated. (example brisk walk for 30 minutes, 5 days a week).          1. Annual wellness visit.  No cognitive impairment is observed. All laboratory results are satisfactory. Influenza vaccine was recommended. Hydration was emphasized. Gabapentin 200 mg, two capsules at bedtime, with two refills, was prescribed. Cymbalta 30 mg, two capsules twice daily, was continued. Celebrex was discontinued due to creatinine issues. Tylenol Arthritis was suggested for pain management. A urine drug screen was ordered for Ambien refill. Annual mammogram was ordered.    Follow-up  A follow-up visit is scheduled for 3 months from now.    Orders Placed This Encounter   Procedures    Mammo Screening Digital Tomosynthesis Bilateral With CAD     Standing Status:   Future     Standing Expiration Date:   8/1/2025     Order Specific Question:   Reason for Exam:     Answer:   Breast cancer screening     Order Specific Question:   Does this patient have a diabetic monitoring/medication delivering device on?     Answer:   No     Order Specific Question:   Release to patient     Answer:   Routine Release [[id number]]    POC Medline 12 Panel Urine Drug Screen     Order Specific Question:   Release to patient     Answer:   Routine Release [[id number]]   Diagnoses and all orders for this visit:    1. Medicare annual wellness visit, subsequent (Primary)    2. Postmenopausal  -     Calcium Carb-Cholecalciferol (Os-Nick Calcium + D3) 500-5 MG-MCG tablet per tablet; Take 1 tablet by mouth 2 (Two) Times a Day.  Dispense: 180 tablet; Refill: 1    3. Rheumatoid arthritis involving multiple sites with positive rheumatoid factor  -     celecoxib (CeleBREX) 200 MG capsule; Take 1 capsule by  mouth Daily.  Dispense: 90 capsule; Refill: 1    4. Chronic pain of left knee  -     Diclofenac Sodium (VOLTAREN) 1 % gel gel; Apply 2 g topically to the appropriate area as directed 4 (Four) Times a Day As Needed (knee pain).  Dispense: 100 g; Refill: 1    5. Post herpetic neuralgia  -     DULoxetine (CYMBALTA) 30 MG capsule; Take 1 capsule by mouth 2 (Two) Times a Day.  Dispense: 60 capsule; Refill: 2  -     gabapentin (NEURONTIN) 100 MG capsule; Take 2 capsules by mouth Every Night.  Dispense: 60 capsule; Refill: 2    6. Acute left-sided low back pain with left-sided sciatica  -     DULoxetine (CYMBALTA) 30 MG capsule; Take 1 capsule by mouth 2 (Two) Times a Day.  Dispense: 60 capsule; Refill: 2    7. Gastroesophageal reflux disease without esophagitis  -     famotidine (PEPCID) 40 MG tablet; Take 1 tablet by mouth Daily.  Dispense: 90 tablet; Refill: 2  -     pantoprazole (PROTONIX) 40 MG EC tablet; Take 1 tablet by mouth Daily.  Dispense: 90 tablet; Refill: 1    8. Cancer  -     folic acid (FOLVITE) 1 MG tablet; Take 1 tablet by mouth Daily.  Dispense: 90 tablet; Refill: 3    9. Hypothyroidism, unspecified type  -     levothyroxine (Synthroid) 50 MCG tablet; Take 1 tablet by mouth Every Morning.  Dispense: 60 tablet; Refill: 0    10. Primary insomnia  -     zolpidem CR (AMBIEN CR) 6.25 MG CR tablet; Take 1 tablet by mouth At Night As Needed for Sleep.  Dispense: 90 tablet; Refill: 0    11. Subacute cough  -     brompheniramine-pseudoephedrine-DM 30-2-10 MG/5ML syrup; Take 5 mL by mouth 4 (Four) Times a Day As Needed for Allergies.  Dispense: 473 mL; Refill: 0    12. Bronchiectasis with acute lower respiratory infection  Comments:  albuterol bid x 5 days and prn  Orders:  -     brompheniramine-pseudoephedrine-DM 30-2-10 MG/5ML syrup; Take 5 mL by mouth 4 (Four) Times a Day As Needed for Allergies.  Dispense: 473 mL; Refill: 0    13. Encounter for medication monitoring  -     POC Medline 12 Panel Urine Drug  Screen    14. Encounter for screening mammogram for malignant neoplasm of breast  -     Mammo Screening Digital Tomosynthesis Bilateral With CAD; Future        New Medications Ordered This Visit   Medications    Calcium Carb-Cholecalciferol (Os-Nick Calcium + D3) 500-5 MG-MCG tablet per tablet     Sig: Take 1 tablet by mouth 2 (Two) Times a Day.     Dispense:  180 tablet     Refill:  1    celecoxib (CeleBREX) 200 MG capsule     Sig: Take 1 capsule by mouth Daily.     Dispense:  90 capsule     Refill:  1    Diclofenac Sodium (VOLTAREN) 1 % gel gel     Sig: Apply 2 g topically to the appropriate area as directed 4 (Four) Times a Day As Needed (knee pain).     Dispense:  100 g     Refill:  1    DULoxetine (CYMBALTA) 30 MG capsule     Sig: Take 1 capsule by mouth 2 (Two) Times a Day.     Dispense:  60 capsule     Refill:  2    famotidine (PEPCID) 40 MG tablet     Sig: Take 1 tablet by mouth Daily.     Dispense:  90 tablet     Refill:  2    folic acid (FOLVITE) 1 MG tablet     Sig: Take 1 tablet by mouth Daily.     Dispense:  90 tablet     Refill:  3    levothyroxine (Synthroid) 50 MCG tablet     Sig: Take 1 tablet by mouth Every Morning.     Dispense:  60 tablet     Refill:  0    pantoprazole (PROTONIX) 40 MG EC tablet     Sig: Take 1 tablet by mouth Daily.     Dispense:  90 tablet     Refill:  1    zolpidem CR (AMBIEN CR) 6.25 MG CR tablet     Sig: Take 1 tablet by mouth At Night As Needed for Sleep.     Dispense:  90 tablet     Refill:  0    brompheniramine-pseudoephedrine-DM 30-2-10 MG/5ML syrup     Sig: Take 5 mL by mouth 4 (Four) Times a Day As Needed for Allergies.     Dispense:  473 mL     Refill:  0    gabapentin (NEURONTIN) 100 MG capsule     Sig: Take 2 capsules by mouth Every Night.     Dispense:  60 capsule     Refill:  2          Follow Up   Return in about 3 months (around 11/1/2024) for Next scheduled follow up.    Patient was given instructions and counseling regarding her condition or for health  maintenance advice. Please see specific information pulled into the AVS if appropriate.    Updated annual wellness visit checklist.  Immunizations discussed.  Screening discussed and/or ordered.  Recommend yearly dental and eye exams. Also discussed monitoring of blood pressure and lipids.      GUNJAN Malloy    Patient or patient representative verbalized consent for the use of Ambient Listening during the visit with  GUNJAN Malloy for chart documentation. 8/1/2024  11:41 EDT       5-Fu Counseling: 5-Fluorouracil Counseling:  I discussed with the patient the risks of 5-fluorouracil including but not limited to erythema, scaling, itching, weeping, crusting, and pain.

## 2024-08-01 ENCOUNTER — OFFICE VISIT (OUTPATIENT)
Dept: FAMILY MEDICINE CLINIC | Facility: CLINIC | Age: 69
End: 2024-08-01
Payer: MEDICARE

## 2024-08-01 VITALS
WEIGHT: 177.6 LBS | TEMPERATURE: 98.5 F | DIASTOLIC BLOOD PRESSURE: 84 MMHG | BODY MASS INDEX: 31.47 KG/M2 | OXYGEN SATURATION: 95 % | SYSTOLIC BLOOD PRESSURE: 138 MMHG | HEIGHT: 63 IN | HEART RATE: 84 BPM

## 2024-08-01 DIAGNOSIS — F51.01 PRIMARY INSOMNIA: ICD-10-CM

## 2024-08-01 DIAGNOSIS — K21.9 GASTROESOPHAGEAL REFLUX DISEASE WITHOUT ESOPHAGITIS: ICD-10-CM

## 2024-08-01 DIAGNOSIS — C80.1 CANCER: ICD-10-CM

## 2024-08-01 DIAGNOSIS — M05.79 RHEUMATOID ARTHRITIS INVOLVING MULTIPLE SITES WITH POSITIVE RHEUMATOID FACTOR: ICD-10-CM

## 2024-08-01 DIAGNOSIS — M54.42 ACUTE LEFT-SIDED LOW BACK PAIN WITH LEFT-SIDED SCIATICA: ICD-10-CM

## 2024-08-01 DIAGNOSIS — M25.562 CHRONIC PAIN OF LEFT KNEE: ICD-10-CM

## 2024-08-01 DIAGNOSIS — Z12.31 ENCOUNTER FOR SCREENING MAMMOGRAM FOR MALIGNANT NEOPLASM OF BREAST: ICD-10-CM

## 2024-08-01 DIAGNOSIS — E03.9 HYPOTHYROIDISM, UNSPECIFIED TYPE: ICD-10-CM

## 2024-08-01 DIAGNOSIS — Z78.0 POSTMENOPAUSAL: ICD-10-CM

## 2024-08-01 DIAGNOSIS — R05.2 SUBACUTE COUGH: ICD-10-CM

## 2024-08-01 DIAGNOSIS — Z00.00 MEDICARE ANNUAL WELLNESS VISIT, SUBSEQUENT: Primary | ICD-10-CM

## 2024-08-01 DIAGNOSIS — G89.29 CHRONIC PAIN OF LEFT KNEE: ICD-10-CM

## 2024-08-01 DIAGNOSIS — Z51.81 ENCOUNTER FOR MEDICATION MONITORING: ICD-10-CM

## 2024-08-01 DIAGNOSIS — B02.29 POST HERPETIC NEURALGIA: ICD-10-CM

## 2024-08-01 DIAGNOSIS — J47.0 BRONCHIECTASIS WITH ACUTE LOWER RESPIRATORY INFECTION: ICD-10-CM

## 2024-08-01 RX ORDER — DULOXETIN HYDROCHLORIDE 30 MG/1
30 CAPSULE, DELAYED RELEASE ORAL 2 TIMES DAILY
Qty: 60 CAPSULE | Refills: 2 | Status: SHIPPED | OUTPATIENT
Start: 2024-08-01

## 2024-08-01 RX ORDER — CALCIUM CARBONATE/VITAMIN D3 500MG-5MCG
1 TABLET ORAL 2 TIMES DAILY
Qty: 180 TABLET | Refills: 1 | Status: SHIPPED | OUTPATIENT
Start: 2024-08-01

## 2024-08-01 RX ORDER — PANTOPRAZOLE SODIUM 40 MG/1
40 TABLET, DELAYED RELEASE ORAL DAILY
Qty: 90 TABLET | Refills: 1 | Status: SHIPPED | OUTPATIENT
Start: 2024-08-01

## 2024-08-01 RX ORDER — CELECOXIB 200 MG/1
200 CAPSULE ORAL DAILY
Qty: 90 CAPSULE | Refills: 1 | Status: SHIPPED | OUTPATIENT
Start: 2024-08-01

## 2024-08-01 RX ORDER — BROMPHENIRAMINE MALEATE, PSEUDOEPHEDRINE HYDROCHLORIDE, AND DEXTROMETHORPHAN HYDROBROMIDE 2; 30; 10 MG/5ML; MG/5ML; MG/5ML
5 SYRUP ORAL 4 TIMES DAILY PRN
Qty: 473 ML | Refills: 0 | Status: SHIPPED | OUTPATIENT
Start: 2024-08-01

## 2024-08-01 RX ORDER — FAMOTIDINE 40 MG/1
40 TABLET, FILM COATED ORAL DAILY
Qty: 90 TABLET | Refills: 2 | Status: SHIPPED | OUTPATIENT
Start: 2024-08-01

## 2024-08-01 RX ORDER — FOLIC ACID 1 MG/1
1 TABLET ORAL DAILY
Qty: 90 TABLET | Refills: 3 | Status: SHIPPED | OUTPATIENT
Start: 2024-08-01

## 2024-08-01 RX ORDER — ZOLPIDEM TARTRATE 6.25 MG/1
6.25 TABLET, FILM COATED, EXTENDED RELEASE ORAL NIGHTLY PRN
Qty: 90 TABLET | Refills: 0 | Status: SHIPPED | OUTPATIENT
Start: 2024-08-01

## 2024-08-01 RX ORDER — LEVOTHYROXINE SODIUM 0.05 MG/1
50 TABLET ORAL
Qty: 60 TABLET | Refills: 0 | Status: SHIPPED | OUTPATIENT
Start: 2024-08-01

## 2024-08-01 RX ORDER — GABAPENTIN 100 MG/1
200 CAPSULE ORAL NIGHTLY
Qty: 60 CAPSULE | Refills: 2 | Status: SHIPPED | OUTPATIENT
Start: 2024-08-01

## 2024-08-01 NOTE — PATIENT INSTRUCTIONS
Insomnia  Insomnia is a sleep disorder that makes it difficult to fall asleep or stay asleep. Insomnia can cause fatigue, low energy, difficulty concentrating, mood swings, and poor performance at work or school.  There are three different ways to classify insomnia:  Difficulty falling asleep.  Difficulty staying asleep.  Waking up too early in the morning.  Any type of insomnia can be long-term (chronic) or short-term (acute). Both are common. Short-term insomnia usually lasts for 3 months or less. Chronic insomnia occurs at least three times a week for longer than 3 months.  What are the causes?  Insomnia may be caused by another condition, situation, or substance, such as:  Having certain mental health conditions, such as anxiety and depression.  Using caffeine, alcohol, tobacco, or drugs.  Having gastrointestinal conditions, such as gastroesophageal reflux disease (GERD).  Having certain medical conditions. These include:  Asthma.  Alzheimer's disease.  Stroke.  Chronic pain.  An overactive thyroid gland (hyperthyroidism).  Other sleep disorders, such as restless legs syndrome and sleep apnea.  Menopause.  Sometimes, the cause of insomnia may not be known.  What increases the risk?  Risk factors for insomnia include:  Gender. Females are affected more often than males.  Age. Insomnia is more common as people get older.  Stress and certain medical and mental health conditions.  Lack of exercise.  Having an irregular work schedule. This may include working night shifts and traveling between different time zones.  What are the signs or symptoms?  If you have insomnia, the main symptom is having trouble falling asleep or having trouble staying asleep. This may lead to other symptoms, such as:  Feeling tired or having low energy.  Feeling nervous about going to sleep.  Not feeling rested in the morning.  Having trouble concentrating.  Feeling irritable, anxious, or depressed.  How is this diagnosed?  This condition  may be diagnosed based on:  Your symptoms and medical history. Your health care provider may ask about:  Your sleep habits.  Any medical conditions you have.  Your mental health.  A physical exam.  How is this treated?  Treatment for insomnia depends on the cause. Treatment may focus on treating an underlying condition that is causing the insomnia. Treatment may also include:  Medicines to help you sleep.  Counseling or therapy.  Lifestyle adjustments to help you sleep better.  Follow these instructions at home:  Eating and drinking    Limit or avoid alcohol, caffeinated beverages, and products that contain nicotine and tobacco, especially close to bedtime. These can disrupt your sleep.  Do not eat a large meal or eat spicy foods right before bedtime. This can lead to digestive discomfort that can make it hard for you to sleep.  Sleep habits    Keep a sleep diary to help you and your health care provider figure out what could be causing your insomnia. Write down:  When you sleep.  When you wake up during the night.  How well you sleep and how rested you feel the next day.  Any side effects of medicines you are taking.  What you eat and drink.  Make your bedroom a dark, comfortable place where it is easy to fall asleep.  Put up shades or blackout curtains to block light from outside.  Use a white noise machine to block noise.  Keep the temperature cool.  Limit screen use before bedtime. This includes:  Not watching TV.  Not using your smartphone, tablet, or computer.  Stick to a routine that includes going to bed and waking up at the same times every day and night. This can help you fall asleep faster. Consider making a quiet activity, such as reading, part of your nighttime routine.  Try to avoid taking naps during the day so that you sleep better at night.  Get out of bed if you are still awake after 15 minutes of trying to sleep. Keep the lights down, but try reading or doing a quiet activity. When you feel  sleepy, go back to bed.  General instructions  Take over-the-counter and prescription medicines only as told by your health care provider.  Exercise regularly as told by your health care provider. However, avoid exercising in the hours right before bedtime.  Use relaxation techniques to manage stress. Ask your health care provider to suggest some techniques that may work well for you. These may include:  Breathing exercises.  Routines to release muscle tension.  Visualizing peaceful scenes.  Make sure that you drive carefully. Do not drive if you feel very sleepy.  Keep all follow-up visits. This is important.  Contact a health care provider if:  You are tired throughout the day.  You have trouble in your daily routine due to sleepiness.  You continue to have sleep problems, or your sleep problems get worse.  Get help right away if:  You have thoughts about hurting yourself or someone else.  Get help right away if you feel like you may hurt yourself or others, or have thoughts about taking your own life. Go to your nearest emergency room or:  Call 911.  Call the National Suicide Prevention Lifeline at 1-196.402.1674 or 424. This is open 24 hours a day.  Text the Crisis Text Line at 541907.  Summary  Insomnia is a sleep disorder that makes it difficult to fall asleep or stay asleep.  Insomnia can be long-term (chronic) or short-term (acute).  Treatment for insomnia depends on the cause. Treatment may focus on treating an underlying condition that is causing the insomnia.  Keep a sleep diary to help you and your health care provider figure out what could be causing your insomnia.  This information is not intended to replace advice given to you by your health care provider. Make sure you discuss any questions you have with your health care provider.  Document Revised: 11/28/2022 Document Reviewed: 11/28/2022  Elsevier Patient Education © 2024 [employer]

## 2024-08-05 ENCOUNTER — OFFICE VISIT (OUTPATIENT)
Dept: PULMONOLOGY | Facility: CLINIC | Age: 69
End: 2024-08-05
Payer: MEDICARE

## 2024-08-05 VITALS
TEMPERATURE: 98 F | OXYGEN SATURATION: 99 % | WEIGHT: 177 LBS | SYSTOLIC BLOOD PRESSURE: 119 MMHG | HEART RATE: 82 BPM | BODY MASS INDEX: 31.36 KG/M2 | RESPIRATION RATE: 18 BRPM | DIASTOLIC BLOOD PRESSURE: 78 MMHG | HEIGHT: 63 IN

## 2024-08-05 DIAGNOSIS — R93.89 ABNORMAL CHEST CT: Primary | ICD-10-CM

## 2024-08-05 DIAGNOSIS — Z87.01 HISTORY OF PSEUDOMONAS PNEUMONIA: ICD-10-CM

## 2024-08-05 DIAGNOSIS — J47.9 BRONCHIECTASIS WITHOUT ACUTE EXACERBATION: ICD-10-CM

## 2024-08-05 PROCEDURE — 1160F RVW MEDS BY RX/DR IN RCRD: CPT | Performed by: STUDENT IN AN ORGANIZED HEALTH CARE EDUCATION/TRAINING PROGRAM

## 2024-08-05 PROCEDURE — 3074F SYST BP LT 130 MM HG: CPT | Performed by: STUDENT IN AN ORGANIZED HEALTH CARE EDUCATION/TRAINING PROGRAM

## 2024-08-05 PROCEDURE — 3078F DIAST BP <80 MM HG: CPT | Performed by: STUDENT IN AN ORGANIZED HEALTH CARE EDUCATION/TRAINING PROGRAM

## 2024-08-05 PROCEDURE — 99214 OFFICE O/P EST MOD 30 MIN: CPT | Performed by: STUDENT IN AN ORGANIZED HEALTH CARE EDUCATION/TRAINING PROGRAM

## 2024-08-05 PROCEDURE — 1159F MED LIST DOCD IN RCRD: CPT | Performed by: STUDENT IN AN ORGANIZED HEALTH CARE EDUCATION/TRAINING PROGRAM

## 2024-08-05 NOTE — PROGRESS NOTES
Primary Care Provider  Jeaneth Barker APRN   Referring Provider  No ref. provider found      Patient Complaint  Follow-up (4 Months) and Bronchiectasis without acute exacerbation      Subjective          Jeimy Willard presents to [facility]      History of Presenting Illness  Jeimy Willard is a 69 y.o. female with history of Pseudomonas pneumonia, bronchiectasis here for follow-up    Patient is doing much better since last visit.  She is currently on albuterol nebulizer, flutter valve and LAZARO nebs every other month.  This regimen seems to have her symptoms better controlled.  She continues to have chronic cough with yellow sputum production but it is much improved compared to previously.  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  Aside from cough and sputum production, she denies fever, chills, hemoptysis.  She recently received her RSV vaccination and will receive the flu vaccination this fall.  She remains a never smoker.    Review of Systems  Constitutional:  No fever. No chills. No weakness.  Eyes: No pain, erythema, or discharge. No blurring of vision.  ENT:  No sore throat, URI symptoms.   Cardiovascular:  No chest pain. No palpitations. No lower extremity edema.  Respiratory:  No shortness of breath, +chronic cough, pleuritic chest pain. No hemoptysis. No dyspnea.   GI:  Normal appetite. No nausea, vomiting, diarrhea. No pain. No melena.  Musculoskeletal:  No arthralgias or myalgias.  Neurologic:  No headache. No weakness.    Family History   Problem Relation Age of Onset    Breast cancer Mother     Stroke Mother     Heart disease Mother     Hypertension Mother     Cancer Mother         unspecified    Diabetes Mother         unspecified type    Kidney failure Mother     Arthritis Mother     Kidney disease Mother     Miscarriages / Stillbirths Mother     Heart failure Mother     Diabetes  Father         unspecified type    Heart disease Brother     Hypertension Brother     Cancer Brother         unspecified    Diabetes Brother         unspecified type    Kidney disease Brother     Lung cancer Maternal Grandmother         malignant    Alzheimer's disease Maternal Grandfather     Esophageal cancer Paternal Grandmother         malignant    Miscarriages / Stillbirths Sister         Social History     Socioeconomic History    Marital status:    Tobacco Use    Smoking status: Never     Passive exposure: Never    Smokeless tobacco: Never   Vaping Use    Vaping status: Never Used   Substance and Sexual Activity    Alcohol use: Never    Drug use: Never    Sexual activity: Not Currently     Partners: Male     Birth control/protection: Post-menopausal        Past Medical History:   Diagnosis Date    Allergic rhinitis     Anemia     Arthritis     Cancer     Cholelithiasis 2000    Colon polyp     GERD (gastroesophageal reflux disease)     History of transfusion     Hypertension     Insomnia     Lymphoma 1999    malignant    Osteoporosis     Seasonal allergies     Stomach cancer 2005    malignant    Stomach disorder     Ulcer (traumatic) of oral mucosa     Vitamin D deficiency         Immunization History   Administered Date(s) Administered    COVID-19 (PFIZER) Purple Cap Monovalent 03/10/2021, 03/31/2021, 10/22/2021    Covid-19 (Pfizer) Gray Cap Monovalent 04/16/2022    Flu Vaccine Quad PF >36MO 09/26/2020    FluMist 2-49yrs 09/26/2020    Fluzone (or Fluarix & Flulaval for VFC) >6mos 09/17/2021    Fluzone High-Dose 65+yrs 10/04/2023    Fluzone Quad >6mos (Multi-dose) 09/26/2020    Pneumococcal Conjugate 20-Valent (PCV20) 11/23/2022    Shingrix 12/19/2021, 03/11/2022       Allergies   Allergen Reactions    Heparin Other (See Comments)     Fever with chills          Current Outpatient Medications:     acetaminophen (TYLENOL) 325 MG tablet, Take 2 tablets by mouth Every 6 (Six) Hours As Needed., Disp: , Rfl:      albuterol (PROVENTIL) (2.5 MG/3ML) 0.083% nebulizer solution, Take 2.5 mg by nebulization Every 4 (Four) Hours As Needed for Wheezing., Disp: 90 each, Rfl: 2    azelastine (ASTELIN) 0.1 % nasal spray, , Disp: , Rfl:     brompheniramine-pseudoephedrine-DM 30-2-10 MG/5ML syrup, Take 5 mL by mouth 4 (Four) Times a Day As Needed for Allergies., Disp: 473 mL, Rfl: 0    Calcium Carb-Cholecalciferol (Os-Nick Calcium + D3) 500-5 MG-MCG tablet per tablet, Take 1 tablet by mouth 2 (Two) Times a Day., Disp: 180 tablet, Rfl: 1    celecoxib (CeleBREX) 200 MG capsule, Take 1 capsule by mouth Daily., Disp: 90 capsule, Rfl: 1    cetirizine (zyrTEC) 10 MG tablet, , Disp: , Rfl:     cyanocobalamin 1000 MCG/ML injection, , Disp: , Rfl:     Diclofenac Sodium (VOLTAREN) 1 % gel gel, Apply 2 g topically to the appropriate area as directed 4 (Four) Times a Day As Needed (knee pain)., Disp: 100 g, Rfl: 1    diphenhydrAMINE (BENADRYL) 25 mg capsule, Take 1 capsule by mouth Every 6 (Six) Hours As Needed., Disp: , Rfl:     DULoxetine (CYMBALTA) 30 MG capsule, Take 1 capsule by mouth 2 (Two) Times a Day., Disp: 60 capsule, Rfl: 2    famotidine (PEPCID) 40 MG tablet, Take 1 tablet by mouth Daily., Disp: 90 tablet, Rfl: 2    fluticasone (FLONASE) 50 MCG/ACT nasal spray, , Disp: , Rfl:     folic acid (FOLVITE) 1 MG tablet, Take 1 tablet by mouth Daily., Disp: 90 tablet, Rfl: 3    gabapentin (NEURONTIN) 100 MG capsule, Take 2 capsules by mouth Every Night., Disp: 60 capsule, Rfl: 2    Iron-Vitamin C (Vitron-C)  MG tablet, , Disp: , Rfl:     levothyroxine (Synthroid) 50 MCG tablet, Take 1 tablet by mouth Every Morning., Disp: 60 tablet, Rfl: 0    lisinopril (PRINIVIL,ZESTRIL) 10 MG tablet, Take 0.5 tablets by mouth Daily., Disp: , Rfl:     multivitamins-minerals (PRESERVISION AREDS 2) capsule capsule, , Disp: , Rfl:     olopatadine (PATANOL) 0.1 % ophthalmic solution, 1 drop., Disp: , Rfl:     pantoprazole (PROTONIX) 40 MG EC tablet, Take  "1 tablet by mouth Daily., Disp: 90 tablet, Rfl: 1    Premarin 0.625 MG/GM vaginal cream, Insert 2 g into the vagina As Needed (Vaginal irritation)., Disp: 30 g, Rfl: 2    Pyridoxine HCl (VITAMIN B-6 PO), , Disp: , Rfl:     tobramycin PF (David) 300 MG/5ML nebulizer solution, Take 5 mL by nebulization 2 (Two) Times a Day. Use daily for 1 month on, one month off., Disp: 50 mL, Rfl: 5    VITAMIN D, CHOLECALCIFEROL, PO, Take 1 tablet by mouth Daily., Disp: , Rfl:     zolpidem CR (AMBIEN CR) 6.25 MG CR tablet, Take 1 tablet by mouth At Night As Needed for Sleep., Disp: 90 tablet, Rfl: 0    cyclobenzaprine (FLEXERIL) 10 MG tablet, Take 1 tablet by mouth 3 (Three) Times a Day As Needed for Muscle Spasms. (Patient not taking: Reported on 5/6/2024), Disp: 30 tablet, Rfl: 2    EPINEPHrine (EPIPEN) 0.3 MG/0.3ML solution auto-injector injection, , Disp: , Rfl:     Current Facility-Administered Medications:     albuterol (PROVENTIL) nebulizer solution 0.042% 1.25 mg/3mL, 1.25 mg, Nebulization, Q6H PRN, Marzena Lim, APRMARLY     Objective     Vital Signs:   /78 (BP Location: Left arm, Patient Position: Sitting, Cuff Size: Adult)   Pulse 82   Temp 98 °F (36.7 °C) (Temporal)   Resp 18   Ht 160 cm (63\")   Wt 80.3 kg (177 lb)   SpO2 99% Comment: Room air  BMI 31.35 kg/m²     Physical Exam  Vitals:    08/05/24 0853   BP: 119/78   Pulse: 82   Resp: 18   Temp: 98 °F (36.7 °C)   SpO2: 99%       General: Alert, NAD  HEENT:  EOMI, no sinus tenderness  Neck:  Supple, no thyromegaly,  no JVD  Lymph: no cervical, supraclavicular lymphadenopathy bilaterally  Chest:  clear to auscultation bilaterally, no wheezing or crackles; no work of breathing noted  CV: RRR, no M/G/R, pulses 2+, equal.  Abd:  Soft, NT, ND, +BS  EXT:  no clubbing, no cyanosis, no edema b/l  Neuro:  A&Ox3, CN grossly intact, no focal deficits  Skin: No rashes or lesions noted       Result Review :   I have personally reviewed patient's labs and images.        "   Assessment and Plan      Patient Active Problem List   Diagnosis    Anemia    Arthritis    Cancer    GERD (gastroesophageal reflux disease)    Stomach disorder    Hypertension    Insomnia    Osteoporosis    Seasonal allergic rhinitis    Ulcerative lesion    Vitamin D deficiency    Lymphoma    Aftercare following surgery of Left Total Knee Arthroplasty, 11/4/2020    Rheumatoid arthritis involving multiple sites with positive rheumatoid factor    Menopausal disorder    S/P TKR (total knee replacement), left    Lymphadenopathy    Acute left-sided low back pain with left-sided sciatica    Herpes zoster without complication    History of Pseudomonas pneumonia    Bronchiectasis without acute exacerbation    Abnormal chest CT       Impression and Plan:    Chronic cough, stable  History of pseudomonal pneumonia  Abnormal chest CT findings  Bronchiectasis  Never smoker  History of gastric lymphoma status post chemo and immunotherapy    -Chest CT done 2/5/2024 showed stable chronic bronchiectasis and atelectasis of right upper lobe, right middle lobe, and lingula.  There is also bronchiectasis with bronchial wall thickening in the lower lobe that appears to be worse compared to previous study.  Tree-in-bud opacities and airspace opacity lower lobe compatible with bronchiolitis and atelectasis/pneumonia.    -Symptoms seem to be controlled on albuterol nebulizers with flutter valve.  -Continue David nebs every other month  -Pulmonary function test showed normal pulmonary function study  -Follow-up in 4-5 months or earlier as needed    Vaccination status: Patient is up-to-date with her COVID, pneumonia, RSV vaccinations.  She plans to obtain the flu vaccination this fall.    Medications personally reviewed.    Follow Up   No follow-ups on file.  Patient was given instructions and counseling regarding her condition or for health maintenance advice. Please see specific information pulled into the AVS if appropriate.

## 2024-08-07 ENCOUNTER — TELEPHONE (OUTPATIENT)
Dept: FAMILY MEDICINE CLINIC | Facility: CLINIC | Age: 69
End: 2024-08-07
Payer: MEDICARE

## 2024-08-07 NOTE — TELEPHONE ENCOUNTER
zolpidem CR (AMBIEN CR) 6.25 MG CR tablet - pharmacy no longer carries the 6.25 & 12.5        Needs replacement of those presriptions     Pharmacy where request should be sent:    Amb[facility] - [id number] - [place], [place] - [address] AT [facility] OF [address]/[facility] & [place] - [phone] [facility] [phone] FX

## 2024-08-07 NOTE — TELEPHONE ENCOUNTER
Caller: Jeimy Willard   Relationship: self  Best call back number: [phone]    Requested Prescriptions:      Calcium Carb-Cholecalciferol (Os-Nick Calcium + D3) 500-5 MG-MCG tablet per tablet - Pharmacy no longer carries that dose, carry only 600/400     zolpidem CR (AMBIEN CR) 6.25 MG CR tablet - pharmacy no longer carries the 6.25 & 12.5      Needs replacement of those presriptions    Pharmacy where request should be sent:    [facility] - [id number] - [facility] [address] [facility] AT [facility] OF [address]/[facility] & [place] - [phone] [facility] [phone] FX     [facility]- [facility] - [address] [phone] [facility] [phone] FX       Would you like a call back once the refill request has been completed: [x ] Yes [ ] No    If the office needs to give you a call back, can they leave a voicemail: [x ] Yes [ ] No

## 2024-08-12 DIAGNOSIS — F51.01 PRIMARY INSOMNIA: ICD-10-CM

## 2024-08-12 RX ORDER — ZOLPIDEM TARTRATE 6.25 MG/1
6.25 TABLET, FILM COATED, EXTENDED RELEASE ORAL NIGHTLY PRN
Qty: 90 TABLET | Refills: 0 | Status: SHIPPED | OUTPATIENT
Start: 2024-08-12

## 2024-08-26 ENCOUNTER — TELEPHONE (OUTPATIENT)
Dept: FAMILY MEDICINE CLINIC | Facility: CLINIC | Age: 69
End: 2024-08-26

## 2024-08-28 DIAGNOSIS — E03.9 HYPOTHYROIDISM, UNSPECIFIED TYPE: ICD-10-CM

## 2024-08-28 RX ORDER — LEVOTHYROXINE SODIUM 50 UG/1
50 TABLET ORAL
Qty: 60 TABLET | Refills: 0 | Status: SHIPPED | OUTPATIENT
Start: 2024-08-28

## 2024-08-30 DIAGNOSIS — E03.9 HYPOTHYROIDISM, UNSPECIFIED TYPE: ICD-10-CM

## 2024-09-03 RX ORDER — LEVOTHYROXINE SODIUM 50 UG/1
50 TABLET ORAL
Qty: 90 TABLET | Refills: 1 | Status: SHIPPED | OUTPATIENT
Start: 2024-09-03

## 2024-10-01 ENCOUNTER — TELEPHONE (OUTPATIENT)
Dept: GASTROENTEROLOGY | Facility: CLINIC | Age: 69
End: 2024-10-01
Payer: MEDICARE

## 2024-10-01 ENCOUNTER — PREP FOR SURGERY (OUTPATIENT)
Dept: OTHER | Facility: HOSPITAL | Age: 69
End: 2024-10-01
Payer: MEDICARE

## 2024-10-01 DIAGNOSIS — D50.9 IRON DEFICIENCY ANEMIA, UNSPECIFIED IRON DEFICIENCY ANEMIA TYPE: ICD-10-CM

## 2024-10-01 DIAGNOSIS — Z85.028 HISTORY OF GASTRIC CANCER: Primary | ICD-10-CM

## 2024-10-01 DIAGNOSIS — R19.5 POSITIVE OCCULT STOOL BLOOD TEST: ICD-10-CM

## 2024-10-01 RX ORDER — SODIUM, POTASSIUM,MAG SULFATES 17.5-3.13G
SOLUTION, RECONSTITUTED, ORAL ORAL
Qty: 354 ML | Refills: 0 | Status: SHIPPED | OUTPATIENT
Start: 2024-10-01

## 2024-10-01 NOTE — TELEPHONE ENCOUNTER
10/1/2024    Dear Dr Dumont,      Patient Name: Jeimy Willard  : 1955      This patient is waiting to have a Colonoscopy and/or Esophagogastroduodenoscopy which I will perform at [facility] on ______10/16/24____________________. Please respond to this request noting your recommendations regarding clearance from a Pulmonary  standpoint.  You may contact our office at [phone] option 3 with any questions. I appreciate your prompt response in this matter. Please return this form to our office as soon as possible to Tejal FALCON Ma.    ____ I approve my patient from a Pulmonary  standpoint    ____ I do NOT approve my patient from a Pulmonary  standpoint at this time    Please inform our office if the patient requires additional follow-up from your office prior to scheduled procedure date.      Please specify clearance expiration date:____________________________________      Approving physician name (please print): _____________________________________________      Approving physician signature: ________________________________ Date:________________  Sincerely,  [facility] - Gastroenterology   Dr. Wright          Please fax approval or denial to our office as soon as possible.

## 2024-10-01 NOTE — TELEPHONE ENCOUNTER
Pt scheduled 10/16/24 at 0800.  Instructions reviewed with pt as well as sent through [employer].

## 2024-10-01 NOTE — TELEPHONE ENCOUNTER
I was contacted by Dr. Sharma.  Pt with h/o gastric lymphoma.  Pt with iron deficiency anemia, stool occult blood positive.  Please schedule EGD/colonoscopy.  OK to schedule week of 10/14.

## 2024-10-01 NOTE — TELEPHONE ENCOUNTER
Case request placed for second sign.   Pt does not take blood thinners or GLP1.   Pt sees Dr Dumont with pulm, clearance sent.     Pt is agreeable to 10/16/24. Will call pt with prep instructions and send via [employer] once case request is signed.

## 2024-10-04 NOTE — PRE-PROCEDURE INSTRUCTIONS
Reminded of arrival time at 0730 , Entrance C of the [facility]. Instructed to bring or have a  over the age of 18 set up to drive you home the day of procedure.    Instructed on clear liquid diet the day before, nothing red or purple. Call with any questions about the prep or if in need of the prep.  Reminded them not to eat or drink anything am of procedure unless its a sip of water with medications.  Hold lisinopril am of procedure.

## 2024-10-09 NOTE — PROGRESS NOTES
Chief Complaint  Insomnia (Follow up)    Subjective          Jeimy Willard is a 69 y.o. female who presents to [facility]    History of Present Illness  History of Present Illness  The patient presents for evaluation of multiple medical concerns.    She underwent an EGD and colonoscopy due to a positive stool test. The colonoscopy showed mild diverticulosis but was otherwise normal, with no biopsies taken. The EGD revealed a few minor spots. She is scheduled for a CT enterography. She reports no abdominal pain or blood in her stool. Despite taking all prescribed medications and receiving injections, she feels her anemia has not improved and believes her body is not absorbing nutrients as effectively as before. She plans to take iron supplements before the holidays.  Currently this is being managed per hematology.    She reports no feelings of depression or hopelessness in the past two weeks however admits to being overworked and having too many projects on her own accord.    She continues to experience leg pain (post herpetic neuralgia), which was particularly severe last night. She spent the day moving between her sewing chair and recliner, and it took some time for the pain to subside. She reports that her scars are improving color and causing pain, but she still lacks sensation in the area. However, she notes that she is beginning to regain some feeling at the bottom of her leg.    She reports sleeping well with the aid of Ambien and believes she has a refill available.    Energy level is normal for pt and she reports compliance with levothyroxine qam.     She needs refills on Bromfed, levothyroxine, albuterol, and tobramycin.      PHQ-2 Total Score: 0         Health Maintenance Due   Topic Date Due    URINE MICROALBUMIN  Never done    TDAP/TD VACCINES (1 - Tdap) Never done    DIABETIC EYE EXAM  07/30/2019    DIABETIC FOOT EXAM  Never done    HEMOGLOBIN A1C  06/22/2021     COVID-19 Vaccine (5 - 2024-25 season) 09/01/2024        Review of Systems   Constitutional:  Negative for chills, fatigue and fever.   Respiratory:  Negative for cough and shortness of breath.    Cardiovascular:  Negative for chest pain and palpitations.   Gastrointestinal:  Negative for abdominal pain, blood in stool, constipation, diarrhea, nausea and vomiting.   Musculoskeletal:  Negative for back pain and neck pain.   Skin:  Negative for rash.   Neurological:  Negative for dizziness and headaches.   Psychiatric/Behavioral:  Positive for sleep disturbance. Negative for dysphoric mood, self-injury and suicidal ideas. The patient is not nervous/anxious.           Medical History: has a past medical history of Allergic rhinitis, Anemia, Arthritis, Cancer, Cholelithiasis (2000), Colon polyp, GERD (gastroesophageal reflux disease), History of transfusion, Hypertension, Insomnia, Lymphoma (1999), Osteoporosis, Seasonal allergies, Stomach cancer (2005), Stomach disorder, Ulcer (traumatic) of oral mucosa, and Vitamin D deficiency.     Surgical History: has a past surgical history that includes Cholecystectomy; Colonoscopy (02/20/2019); Esophagogastroduodenoscopy (03/13/2020); Gallbladder surgery; Laparoscopic total gastrectomy; Replacement total knee (Left, 2020); Esophagogastroduodenoscopy (N/A, 10/16/2024); and Colonoscopy (N/A, 10/16/2024).     Family History: family history includes Alzheimer's disease in her maternal grandfather; Arthritis in her mother; Breast cancer in her mother; Cancer in her brother and mother; Diabetes in her brother, father, and mother; Esophageal cancer in her paternal grandmother; Heart disease in her brother and mother; Heart failure in her mother; Hypertension in her brother and mother; Kidney disease in her brother and mother; Kidney failure in her mother; Lung cancer in her maternal grandmother; Miscarriages / Stillbirths in her mother and sister; Stroke in her mother.     Social  History: reports that she has never smoked. She has never been exposed to tobacco smoke. She has never used smokeless tobacco. She reports that she does not drink alcohol and does not use drugs.    Allergies: Heparin      Current Outpatient Medications:     acetaminophen (TYLENOL) 325 MG tablet, Take 2 tablets by mouth Every 6 (Six) Hours As Needed., Disp: , Rfl:     albuterol (PROVENTIL) (2.5 MG/3ML) 0.083% nebulizer solution, Take 2.5 mg by nebulization Every 4 (Four) Hours As Needed for Wheezing., Disp: 90 each, Rfl: 2    azelastine (ASTELIN) 0.1 % nasal spray, , Disp: , Rfl:     brompheniramine-pseudoephedrine-DM 30-2-10 MG/5ML syrup, Take 5 mL by mouth 4 (Four) Times a Day As Needed for Allergies., Disp: 473 mL, Rfl: 0    Calcium Carbonate-Vitamin D 600-10 MG-MCG per tablet, Take 1 tablet by mouth 2 (Two) Times a Day., Disp: 180 tablet, Rfl: 1    celecoxib (CeleBREX) 200 MG capsule, Take 1 capsule by mouth Daily., Disp: 90 capsule, Rfl: 1    cetirizine (zyrTEC) 10 MG tablet, , Disp: , Rfl:     cyanocobalamin 1000 MCG/ML injection, , Disp: , Rfl:     Diclofenac Sodium (VOLTAREN) 1 % gel gel, Apply 2 g topically to the appropriate area as directed 4 (Four) Times a Day As Needed (knee pain)., Disp: 100 g, Rfl: 1    diphenhydrAMINE (BENADRYL) 25 mg capsule, Take 1 capsule by mouth Every 6 (Six) Hours As Needed., Disp: , Rfl:     DULoxetine (CYMBALTA) 30 MG capsule, Take 1 capsule by mouth 2 (Two) Times a Day., Disp: 60 capsule, Rfl: 2    famotidine (PEPCID) 20 MG tablet, , Disp: , Rfl:     fluticasone (FLONASE) 50 MCG/ACT nasal spray, , Disp: , Rfl:     folic acid (FOLVITE) 1 MG tablet, Take 1 tablet by mouth Daily., Disp: 90 tablet, Rfl: 3    gabapentin (NEURONTIN) 100 MG capsule, Take 2 capsules by mouth Every Night., Disp: 60 capsule, Rfl: 2    Iron-Vitamin C (Vitron-C)  MG tablet, , Disp: , Rfl:     levothyroxine (SYNTHROID, LEVOTHROID) 50 MCG tablet, Take 1 tablet by mouth Every Morning., Disp: 90  "tablet, Rfl: 1    lisinopril (PRINIVIL,ZESTRIL) 10 MG tablet, Take 0.5 tablets by mouth Daily., Disp: , Rfl:     multivitamins-minerals (PRESERVISION AREDS 2) capsule capsule, , Disp: , Rfl:     olopatadine (PATANOL) 0.1 % ophthalmic solution, 1 drop., Disp: , Rfl:     pantoprazole (PROTONIX) 40 MG EC tablet, Take 1 tablet by mouth Daily., Disp: 90 tablet, Rfl: 1    Premarin 0.625 MG/GM vaginal cream, Insert 2 g into the vagina As Needed (Vaginal irritation)., Disp: 30 g, Rfl: 2    Pyridoxine HCl (VITAMIN B-6 PO), , Disp: , Rfl:     tobramycin PF (David) 300 MG/5ML nebulizer solution, Take 5 mL by nebulization 2 (Two) Times a Day. Use daily for 1 month on, one month off., Disp: 50 mL, Rfl: 5    VITAMIN D, CHOLECALCIFEROL, PO, Take 1 tablet by mouth Daily., Disp: , Rfl:     zolpidem CR (AMBIEN CR) 6.25 MG CR tablet, Take 1 tablet by mouth At Night As Needed for Sleep., Disp: 90 tablet, Rfl: 0    EPINEPHrine (EPIPEN) 0.3 MG/0.3ML solution auto-injector injection, , Disp: , Rfl:     Current Facility-Administered Medications:     albuterol (PROVENTIL) nebulizer solution 0.042% 1.25 mg/3mL, 1.25 mg, Nebulization, Q6H PRN, Marzena Lim, GUNJAN      Immunization History   Administered Date(s) Administered    COVID-19 (PFIZER) Purple Cap Monovalent 03/10/2021, 03/31/2021, 10/22/2021    Covid-19 (Pfizer) Gray Cap Monovalent 04/16/2022    Flu Vaccine Quad PF >36MO 09/26/2020    FluMist 2-49yrs 09/26/2020    Fluzone (or Fluarix & Flulaval for VFC) >6mos 09/17/2021    Fluzone High-Dose 65+yrs 10/04/2023    Fluzone Quad >6mos (Multi-dose) 09/26/2020    Pneumococcal Conjugate 20-Valent (PCV20) 11/23/2022    Shingrix 12/19/2021, 03/11/2022         Objective       Vitals:    11/04/24 0901   BP: 132/84   Pulse: 86   Temp: 97.3 °F (36.3 °C)   TempSrc: Temporal   SpO2: 95%   Weight: 81.6 kg (180 lb)   Height: 160 cm (62.99\")      Body mass index is 31.89 kg/m².   Wt Readings from Last 3 Encounters:   11/04/24 81.6 kg (180 lb) "   10/16/24 82.4 kg (181 lb 10.5 oz)   08/05/24 80.3 kg (177 lb)      BP Readings from Last 3 Encounters:   11/04/24 132/84   10/16/24 143/85   08/05/24 119/78             Physical Exam  Vitals reviewed.   Constitutional:       Appearance: Normal appearance. She is well-developed.   HENT:      Head: Normocephalic and atraumatic.   Eyes:      Conjunctiva/sclera: Conjunctivae normal.      Pupils: Pupils are equal, round, and reactive to light.   Cardiovascular:      Rate and Rhythm: Normal rate and regular rhythm.      Heart sounds: Normal heart sounds. No murmur heard.  Pulmonary:      Effort: Pulmonary effort is normal.      Breath sounds: Normal breath sounds. No wheezing or rhonchi.   Abdominal:      General: Bowel sounds are normal. There is no distension.      Palpations: Abdomen is soft.      Tenderness: There is no abdominal tenderness.   Skin:     General: Skin is warm and dry.   Neurological:      Mental Status: She is alert and oriented to person, place, and time.   Psychiatric:         Mood and Affect: Mood and affect normal.         Behavior: Behavior normal.         Thought Content: Thought content normal.         Judgment: Judgment normal.         Physical Exam        Result Review :   Results  Laboratory Studies  Hemoglobin was 10.8.    Imaging  Colonoscopy showed diverticulosis. EGD showed a couple of spots.       Common labs          6/28/2024    07:34 7/17/2024    08:16 10/22/2024    12:34   Common Labs   Glucose 86  68     BUN 24  23     Creatinine 1.33  1.22     Sodium 137  135     Potassium 4.3  4.3     Chloride 103  102     Calcium 9.1  9.2     Albumin 3.8      Total Bilirubin 0.4      Alkaline Phosphatase 85      AST (SGOT) 36      ALT (SGPT) 29      WBC 6.24   6.92    Hemoglobin 11.3   10.8    Hematocrit 35.9   34.1    Platelets 294   323    Total Cholesterol 168      Triglycerides 81      HDL Cholesterol 83      LDL Cholesterol  70                       Assessment and Plan        Diagnoses and  all orders for this visit:    1. Primary insomnia (Primary)  -     zolpidem CR (AMBIEN CR) 6.25 MG CR tablet; Take 1 tablet by mouth At Night As Needed for Sleep.  Dispense: 90 tablet; Refill: 0    2. Subacute cough  -     brompheniramine-pseudoephedrine-DM 30-2-10 MG/5ML syrup; Take 5 mL by mouth 4 (Four) Times a Day As Needed for Allergies.  Dispense: 473 mL; Refill: 0    3. Bronchiectasis with acute lower respiratory infection  Comments:  albuterol bid x 5 days and prn  Orders:  -     brompheniramine-pseudoephedrine-DM 30-2-10 MG/5ML syrup; Take 5 mL by mouth 4 (Four) Times a Day As Needed for Allergies.  Dispense: 473 mL; Refill: 0    4. Hypothyroidism, unspecified type  -     levothyroxine (SYNTHROID, LEVOTHROID) 50 MCG tablet; Take 1 tablet by mouth Every Morning.  Dispense: 90 tablet; Refill: 1    Other orders  -     Calcium Carbonate-Vitamin D 600-10 MG-MCG per tablet; Take 1 tablet by mouth 2 (Two) Times a Day.  Dispense: 180 tablet; Refill: 1        Assessment & Plan    Obtained a written consent for LEESA query. Discussed the risks and benefits of the use of controlled substances with the patient, including the risk of tolerance and drug dependence.  The patient has been counseled on the need to have an exit strategy, including potentially discontinuing the use of controlled substances.  LEESA has or will be reviewed as soon as it becomes available.      1. Anemia.  Her hemoglobin level was recorded at 10.8 during the last visit. She reports not absorbing nutrients as she used to, likely due to previous stomach surgery. She is scheduled for a CT enterography to further investigate the source of blood loss. Iron supplementation was discussed as a potential treatment option before the holidays to improve her energy levels.    2. Leg pain.  She reports persistent leg pain, which was particularly bad last night. She was in and out of the sewing chair and recliner, which may have contributed to the  discomfort. The leg appears clear, and the scars are healing well, though there is still some pain around the area.    3. Insomnia.  She is sleeping well with Ambien. A prescription for Ambien will be sent to [facility] [address].    4. Medication Management.  Refills for calcium, Bromfed, levothyroxine, albuterol, and tobramycin will be sent to her respective pharmacies. She uses multiple pharmacies, including [facility] and [email or web address].      Return in about 3 months (around 2/4/2025) for Next scheduled follow up.    Patient was given instructions and counseling regarding her condition or for health maintenance advice. Please see specific information pulled into the AVS if appropriate.     GUNJAN Malloy    Patient or patient representative verbalized consent for the use of Ambient Listening during the visit with  GUNJAN Malloy for chart documentation. 11/4/2024  09:21 EST

## 2024-10-15 ENCOUNTER — ANESTHESIA EVENT (OUTPATIENT)
Dept: GASTROENTEROLOGY | Facility: HOSPITAL | Age: 69
End: 2024-10-15
Payer: MEDICARE

## 2024-10-15 ENCOUNTER — HOSPITAL ENCOUNTER (OUTPATIENT)
Dept: MAMMOGRAPHY | Facility: HOSPITAL | Age: 69
Discharge: HOME | End: 2024-10-15
Admitting: NURSE PRACTITIONER
Payer: MEDICARE

## 2024-10-15 DIAGNOSIS — Z12.31 ENCOUNTER FOR SCREENING MAMMOGRAM FOR MALIGNANT NEOPLASM OF BREAST: ICD-10-CM

## 2024-10-15 PROCEDURE — 77063 BREAST TOMOSYNTHESIS BI: CPT

## 2024-10-15 PROCEDURE — 77067 SCR MAMMO BI INCL CAD: CPT

## 2024-10-15 NOTE — ANESTHESIA PREPROCEDURE EVALUATION
Anesthesia Evaluation     Nursing notes reviewed   NPO Solid Status: > 8 hours  NPO Liquid Status: > 4 hours           Airway   Mallampati: II  TM distance: >3 FB  Neck ROM: full  No difficulty expected  Dental - normal exam     Pulmonary - normal exam    breath sounds clear to auscultation  (+) pneumonia (Pseudomonas infection using tobramycin aerosols a month on a month off) resolved ,  Cardiovascular   Exercise tolerance: good (4-7 METS)    ECG reviewed  Rhythm: irregular  Rate: normal    (+) hypertension well controlled less than 2 medications, murmur      Neuro/Psych  (+) numbness (related to carpal tunnel and shingles)  GI/Hepatic/Renal/Endo    (+) GERD well controlled, thyroid problem hypothyroidism    Musculoskeletal     Abdominal  - normal exam    Abdomen: soft.  Bowel sounds: normal.   Substance History      OB/GYN negative ob/gyn ROS         Other   arthritis, autoimmune disease rheumatoid arthritis,   history of cancer (lymphoma, gastric)    ROS/Med Hx Other: PFTs 3/26/24: WNL    Hx of lymphoma and gastric CA with gastrectomy     Last EKG 2020                Anesthesia Plan    ASA 3     general   total IV anesthesia  (Total IV Anesthesia    Patient understands anesthesia not responsible for dental damage.  )  intravenous induction     Anesthetic plan, risks, benefits, and alternatives have been provided, discussed and informed consent has been obtained with: patient.  Pre-procedure education provided  Plan discussed with CRNA.      CODE STATUS:

## 2024-10-16 ENCOUNTER — TELEPHONE (OUTPATIENT)
Dept: GASTROENTEROLOGY | Facility: CLINIC | Age: 69
End: 2024-10-16
Payer: MEDICARE

## 2024-10-16 ENCOUNTER — HOSPITAL ENCOUNTER (OUTPATIENT)
Facility: HOSPITAL | Age: 69
Setting detail: HOSPITAL OUTPATIENT SURGERY
Discharge: HOME | End: 2024-10-16
Attending: INTERNAL MEDICINE | Admitting: INTERNAL MEDICINE
Payer: MEDICARE

## 2024-10-16 ENCOUNTER — ANESTHESIA (OUTPATIENT)
Dept: GASTROENTEROLOGY | Facility: HOSPITAL | Age: 69
End: 2024-10-16
Payer: MEDICARE

## 2024-10-16 VITALS
WEIGHT: 181.66 LBS | HEART RATE: 72 BPM | TEMPERATURE: 98.2 F | HEIGHT: 63 IN | OXYGEN SATURATION: 97 % | DIASTOLIC BLOOD PRESSURE: 85 MMHG | SYSTOLIC BLOOD PRESSURE: 143 MMHG | RESPIRATION RATE: 24 BRPM | BODY MASS INDEX: 32.19 KG/M2

## 2024-10-16 DIAGNOSIS — Z85.028 HISTORY OF GASTRIC CANCER: ICD-10-CM

## 2024-10-16 DIAGNOSIS — R19.5 POSITIVE OCCULT STOOL BLOOD TEST: ICD-10-CM

## 2024-10-16 DIAGNOSIS — D50.9 IRON DEFICIENCY ANEMIA, UNSPECIFIED IRON DEFICIENCY ANEMIA TYPE: ICD-10-CM

## 2024-10-16 PROCEDURE — 88305 TISSUE EXAM BY PATHOLOGIST: CPT | Performed by: INTERNAL MEDICINE

## 2024-10-16 PROCEDURE — 25010000002 LIDOCAINE PF 2% 2 % SOLUTION: Performed by: NURSE ANESTHETIST, CERTIFIED REGISTERED

## 2024-10-16 PROCEDURE — 45378 DIAGNOSTIC COLONOSCOPY: CPT | Performed by: INTERNAL MEDICINE

## 2024-10-16 PROCEDURE — 25810000003 LACTATED RINGERS PER 1000 ML: Performed by: NURSE ANESTHETIST, CERTIFIED REGISTERED

## 2024-10-16 PROCEDURE — 25010000002 PROPOFOL 10 MG/ML EMULSION: Performed by: NURSE ANESTHETIST, CERTIFIED REGISTERED

## 2024-10-16 PROCEDURE — 43239 EGD BIOPSY SINGLE/MULTIPLE: CPT | Performed by: INTERNAL MEDICINE

## 2024-10-16 RX ORDER — SODIUM CHLORIDE, SODIUM LACTATE, POTASSIUM CHLORIDE, CALCIUM CHLORIDE 600; 310; 30; 20 MG/100ML; MG/100ML; MG/100ML; MG/100ML
30 INJECTION, SOLUTION INTRAVENOUS CONTINUOUS
Status: DISCONTINUED | OUTPATIENT
Start: 2024-10-16 | End: 2024-10-16 | Stop reason: HOSPADM

## 2024-10-16 RX ORDER — LIDOCAINE HYDROCHLORIDE 20 MG/ML
INJECTION, SOLUTION EPIDURAL; INFILTRATION; INTRACAUDAL; PERINEURAL AS NEEDED
Status: DISCONTINUED | OUTPATIENT
Start: 2024-10-16 | End: 2024-10-16 | Stop reason: SURG

## 2024-10-16 RX ORDER — PROPOFOL 10 MG/ML
VIAL (ML) INTRAVENOUS AS NEEDED
Status: DISCONTINUED | OUTPATIENT
Start: 2024-10-16 | End: 2024-10-16 | Stop reason: SURG

## 2024-10-16 RX ADMIN — PROPOFOL 50 MG: 10 INJECTION, EMULSION INTRAVENOUS at 09:46

## 2024-10-16 RX ADMIN — LIDOCAINE HYDROCHLORIDE 40 MG: 20 INJECTION, SOLUTION INTRAVENOUS at 09:46

## 2024-10-16 RX ADMIN — SODIUM CHLORIDE, POTASSIUM CHLORIDE, SODIUM LACTATE AND CALCIUM CHLORIDE: 600; 310; 30; 20 INJECTION, SOLUTION INTRAVENOUS at 09:44

## 2024-10-16 RX ADMIN — PROPOFOL 200 MCG/KG/MIN: 10 INJECTION, EMULSION INTRAVENOUS at 09:46

## 2024-10-16 NOTE — H&P
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    Past Medical History:   Past Medical History:   Diagnosis Date    Allergic rhinitis     Anemia     Arthritis     Cancer     Cholelithiasis 2000    Colon polyp     GERD (gastroesophageal reflux disease)     History of transfusion     Hypertension     Insomnia     Lymphoma 1999    malignant    Osteoporosis     Seasonal allergies     Stomach cancer 2005    malignant    Stomach disorder     Ulcer (traumatic) of oral mucosa     Vitamin D deficiency        Past Surgical History:  Past Surgical History:   Procedure Laterality Date    CHOLECYSTECTOMY      COLONOSCOPY  02/20/2019 2/20/19 poyp 6 mm in ascending colon,polyp 2mm in sigmoid colon,polypectomy,mild diverticulosos of sigmond colon,grade 1 internal hemorrhoids , repeat 3-5 years     ENDOSCOPY  03/13/2020    normal mucosa in the whole esophagus, nodular tissue visualized at the esophago-jejunal anastomosis, afferent and efferent sm bowel limbs visualized  patchy erthema & single erosion visualized in one jejunal limb.no gastric tissue visualized      GALLBLADDER SURGERY      LAPAROSCOPIC TOTAL GASTRECTOMY      REPLACEMENT TOTAL KNEE Left 2020       Family History:  Family History   Problem Relation Age of Onset    Breast cancer Mother     Stroke Mother     Heart disease Mother     Hypertension Mother     Cancer Mother         unspecified    Diabetes Mother         unspecified type    Kidney failure Mother     Arthritis Mother     Kidney disease Mother     Miscarriages / Stillbirths Mother     Heart failure Mother     Diabetes Father         unspecified type    Heart disease Brother     Hypertension Brother     Cancer Brother         unspecified    Diabetes Brother         unspecified type    Kidney disease Brother     Lung  cancer Maternal Grandmother         malignant    Alzheimer's disease Maternal Grandfather     Esophageal cancer Paternal Grandmother         malignant    Miscarriages / Stillbirths Sister        Social History:   reports that she has never smoked. She has never been exposed to tobacco smoke. She has never used smokeless tobacco. She reports that she does not drink alcohol and does not use drugs.    Medications:   Facility-Administered Medications Prior to Admission   Medication Dose Route Frequency Provider Last Rate Last Admin    albuterol (PROVENTIL) nebulizer solution 0.042% 1.25 mg/3mL  1.25 mg Nebulization Q6H PRN Marzena Lim APRN         Medications Prior to Admission   Medication Sig Dispense Refill Last Dose/Taking    acetaminophen (TYLENOL) 325 MG tablet Take 2 tablets by mouth Every 6 (Six) Hours As Needed.       albuterol (PROVENTIL) (2.5 MG/3ML) 0.083% nebulizer solution Take 2.5 mg by nebulization Every 4 (Four) Hours As Needed for Wheezing. 90 each 2     azelastine (ASTELIN) 0.1 % nasal spray        brompheniramine-pseudoephedrine-DM 30-2-10 MG/5ML syrup Take 5 mL by mouth 4 (Four) Times a Day As Needed for Allergies. 473 mL 0     Calcium Carbonate-Vitamin D 600-10 MG-MCG per tablet Take 1 tablet by mouth 2 (Two) Times a Day. 180 tablet 1     celecoxib (CeleBREX) 200 MG capsule Take 1 capsule by mouth Daily. 90 capsule 1     cetirizine (zyrTEC) 10 MG tablet        cyanocobalamin 1000 MCG/ML injection        cyclobenzaprine (FLEXERIL) 10 MG tablet Take 1 tablet by mouth 3 (Three) Times a Day As Needed for Muscle Spasms. (Patient not taking: Reported on 5/6/2024) 30 tablet 2     Diclofenac Sodium (VOLTAREN) 1 % gel gel Apply 2 g topically to the appropriate area as directed 4 (Four) Times a Day As Needed (knee pain). 100 g 1     diphenhydrAMINE (BENADRYL) 25 mg capsule Take 1 capsule by mouth Every 6 (Six) Hours As Needed.       DULoxetine (CYMBALTA) 30 MG capsule Take 1 capsule by mouth 2 (Two)  "Times a Day. 60 capsule 2     EPINEPHrine (EPIPEN) 0.3 MG/0.3ML solution auto-injector injection  (Patient not taking: Reported on 5/6/2024)       famotidine (PEPCID) 40 MG tablet Take 1 tablet by mouth Daily. 90 tablet 2     fluticasone (FLONASE) 50 MCG/ACT nasal spray        folic acid (FOLVITE) 1 MG tablet Take 1 tablet by mouth Daily. 90 tablet 3     gabapentin (NEURONTIN) 100 MG capsule Take 2 capsules by mouth Every Night. 60 capsule 2     Iron-Vitamin C (Vitron-C)  MG tablet        levothyroxine (SYNTHROID, LEVOTHROID) 50 MCG tablet TAKE 1 TABLET BY MOUTH EVERY MORNING 90 tablet 1     lisinopril (PRINIVIL,ZESTRIL) 10 MG tablet Take 0.5 tablets by mouth Daily.       multivitamins-minerals (PRESERVISION AREDS 2) capsule capsule        olopatadine (PATANOL) 0.1 % ophthalmic solution 1 drop.       pantoprazole (PROTONIX) 40 MG EC tablet Take 1 tablet by mouth Daily. 90 tablet 1     Premarin 0.625 MG/GM vaginal cream Insert 2 g into the vagina As Needed (Vaginal irritation). 30 g 2     Pyridoxine HCl (VITAMIN B-6 PO)        sodium-potassium-magnesium sulfates (SUPREP) 17.5-3.13-1.6 GM/177ML solution oral solution Take as directed 354 mL 0     tobramycin PF (David) 300 MG/5ML nebulizer solution Take 5 mL by nebulization 2 (Two) Times a Day. Use daily for 1 month on, one month off. 50 mL 5     VITAMIN D, CHOLECALCIFEROL, PO Take 1 tablet by mouth Daily.       zolpidem CR (AMBIEN CR) 6.25 MG CR tablet Take 1 tablet by mouth At Night As Needed for Sleep. 90 tablet 0        Allergies:  Heparin    ROS:    Pertinent items are noted in HPI     Objective     Blood pressure 165/81, pulse 80, temperature 97.1 °F (36.2 °C), temperature source Temporal, resp. rate 20, height 160 cm (63\"), weight 82.4 kg (181 lb 10.5 oz), SpO2 99%, not currently breastfeeding.    Physical Exam   Constitutional: Pt is oriented to person, place, and time and well-developed, well-nourished, and in no distress.   Mouth/Throat: Oropharynx is " clear and moist.   Neck: Normal range of motion.   Cardiovascular: Normal rate, regular rhythm and normal heart sounds.    Pulmonary/Chest: Effort normal and breath sounds normal.   Abdominal: Soft. Nontender  Skin: Skin is warm and dry.   Psychiatric: Mood, memory, affect and judgment normal.     Assessment & Plan     Diagnosis:  Iron deficiency anemia, stool occult blood positive    Anticipated Surgical Procedure:  EGD/colonoscopy    The risks, benefits, and alternatives of this procedure have been discussed with the patient or the responsible party- the patient understands and agrees to proceed.

## 2024-10-16 NOTE — LETTER
October 16, 2024    Jeimy Willard  [address] [place] [address]    10/16/2024         Jeimy Willard   [address] [place] [address]            Dear Jeimy Wright MD performed a(n) Colonoscopy on October 16, 2024; Your biopsy results were benign.  We recommend that you have a repeat Colonoscopy in 5 years.    A colonoscopy is the best way to screen for colon polyps and colon cancer, however despite careful evaluation, small polyps can sometimes be missed. If you should develop any bleeding, abdominal pain, weight loss, change in bowel habits, or any other GI complications, please inform our office as soon as possible.      Additionally, Jane Wright MD recommends you adopt the following healthy habits to lower your risk of future polyps and colon cancer:    Exercise regularly.  Do not smoke or consume alcohol.  Limit red meat intake.  Include ample fruits and vegetables in your diet.    If you have any questions regarding your procedure or results, please do not hesitate to contact our office.    Sincerely,        [facility]                            GUNJAN Hernandez

## 2024-10-16 NOTE — ANESTHESIA POSTPROCEDURE EVALUATION
Patient: Jeimy Willard    Procedure Summary       Date: 10/16/24 Room / Location: [facility] ENDOSCOPY 3 / [facility] ENDOSCOPY    Anesthesia Start: 0944 Anesthesia Stop: 1021    Procedures:       ESOPHAGOGASTRODUODENOSCOPY with biopsies      COLONOSCOPY Diagnosis:       History of gastric cancer      Iron deficiency anemia, unspecified iron deficiency anemia type      Positive occult stool blood test      (History of gastric cancer [Z85.028])      (Iron deficiency anemia, unspecified iron deficiency anemia type [D50.9])      (Positive occult stool blood test [R19.5])    Surgeons: Jane Wright MD Provider: Alma Linares CRNA    Anesthesia Type: general ASA Status: 3            Anesthesia Type: general    Vitals  Vitals Value Taken Time   /85 10/16/24 1033   Temp 36.8 °C (98.2 °F) 10/16/24 1017   Pulse 70 10/16/24 1034   Resp 24 10/16/24 1017   SpO2 91 % 10/16/24 1034   Vitals shown include unfiled device data.        Post Anesthesia Care and Evaluation    Patient location during evaluation: bedside  Patient participation: complete - patient participated  Level of consciousness: awake  Pain management: adequate    Airway patency: patent  Anesthetic complications: No anesthetic complications  PONV Status: controlled  Cardiovascular status: acceptable and stable  Respiratory status: acceptable

## 2024-10-17 LAB
CYTO UR: NORMAL
LAB AP CASE REPORT: NORMAL
LAB AP CLINICAL INFORMATION: NORMAL
PATH REPORT.FINAL DX SPEC: NORMAL
PATH REPORT.GROSS SPEC: NORMAL

## 2024-10-21 DIAGNOSIS — D50.9 IRON DEFICIENCY ANEMIA, UNSPECIFIED IRON DEFICIENCY ANEMIA TYPE: Primary | ICD-10-CM

## 2024-10-22 ENCOUNTER — TELEPHONE (OUTPATIENT)
Dept: GASTROENTEROLOGY | Facility: CLINIC | Age: 69
End: 2024-10-22
Payer: MEDICARE

## 2024-10-22 ENCOUNTER — LAB (OUTPATIENT)
Dept: LAB | Facility: HOSPITAL | Age: 69
End: 2024-10-22
Payer: MEDICARE

## 2024-10-22 DIAGNOSIS — D50.9 IRON DEFICIENCY ANEMIA, UNSPECIFIED IRON DEFICIENCY ANEMIA TYPE: ICD-10-CM

## 2024-10-22 LAB
BASOPHILS # BLD AUTO: 0.04 10*3/MM3 (ref 0–0.2)
BASOPHILS NFR BLD AUTO: 0.6 % (ref 0–1.5)
DEPRECATED RDW RBC AUTO: 41.8 FL (ref 37–54)
EOSINOPHIL # BLD AUTO: 0.18 10*3/MM3 (ref 0–0.4)
EOSINOPHIL NFR BLD AUTO: 2.6 % (ref 0.3–6.2)
ERYTHROCYTE [DISTWIDTH] IN BLOOD BY AUTOMATED COUNT: 13.6 % (ref 12.3–15.4)
HCT VFR BLD AUTO: 34.1 % (ref 34–46.6)
HGB BLD-MCNC: 10.8 G/DL (ref 12–15.9)
IMM GRANULOCYTES # BLD AUTO: 0.02 10*3/MM3 (ref 0–0.05)
IMM GRANULOCYTES NFR BLD AUTO: 0.3 % (ref 0–0.5)
IRON 24H UR-MRATE: 40 MCG/DL (ref 37–145)
IRON SATN MFR SERPL: 9 % (ref 20–50)
LYMPHOCYTES # BLD AUTO: 2.75 10*3/MM3 (ref 0.7–3.1)
LYMPHOCYTES NFR BLD AUTO: 39.7 % (ref 19.6–45.3)
MCH RBC QN AUTO: 26.8 PG (ref 26.6–33)
MCHC RBC AUTO-ENTMCNC: 31.7 G/DL (ref 31.5–35.7)
MCV RBC AUTO: 84.6 FL (ref 79–97)
MONOCYTES # BLD AUTO: 0.54 10*3/MM3 (ref 0.1–0.9)
MONOCYTES NFR BLD AUTO: 7.8 % (ref 5–12)
NEUTROPHILS NFR BLD AUTO: 3.39 10*3/MM3 (ref 1.7–7)
NEUTROPHILS NFR BLD AUTO: 49 % (ref 42.7–76)
NRBC BLD AUTO-RTO: 0 /100 WBC (ref 0–0.2)
PLATELET # BLD AUTO: 323 10*3/MM3 (ref 140–450)
PMV BLD AUTO: 10.2 FL (ref 6–12)
RBC # BLD AUTO: 4.03 10*6/MM3 (ref 3.77–5.28)
TIBC SERPL-MCNC: 456 MCG/DL (ref 298–536)
TRANSFERRIN SERPL-MCNC: 306 MG/DL (ref 200–360)
WBC NRBC COR # BLD AUTO: 6.92 10*3/MM3 (ref 3.4–10.8)

## 2024-10-22 PROCEDURE — 36415 COLL VENOUS BLD VENIPUNCTURE: CPT

## 2024-10-22 PROCEDURE — 84466 ASSAY OF TRANSFERRIN: CPT

## 2024-10-22 PROCEDURE — 83540 ASSAY OF IRON: CPT

## 2024-10-22 PROCEDURE — 85025 COMPLETE CBC W/AUTO DIFF WBC: CPT

## 2024-10-22 NOTE — TELEPHONE ENCOUNTER
----- Message from Kemi Gould sent at 10/21/2024  4:09 PM EDT -----  Biopsies benign.  Recommend CBC and iron profile.  If iron deficiency anemia is persistent, recommend CT enterography for further workup.  Orders placed for labs.  Please place in recall for repeat colonoscopy in 5 years.

## 2024-10-23 DIAGNOSIS — B02.29 POST HERPETIC NEURALGIA: ICD-10-CM

## 2024-10-23 DIAGNOSIS — M54.42 ACUTE LEFT-SIDED LOW BACK PAIN WITH LEFT-SIDED SCIATICA: ICD-10-CM

## 2024-10-23 RX ORDER — DULOXETIN HYDROCHLORIDE 30 MG/1
30 CAPSULE, DELAYED RELEASE ORAL 2 TIMES DAILY
Qty: 60 CAPSULE | Refills: 2 | Status: SHIPPED | OUTPATIENT
Start: 2024-10-23

## 2024-10-24 DIAGNOSIS — Z85.028 HISTORY OF GASTRIC CANCER: ICD-10-CM

## 2024-10-24 DIAGNOSIS — R19.5 POSITIVE OCCULT STOOL BLOOD TEST: ICD-10-CM

## 2024-10-24 DIAGNOSIS — D50.9 IRON DEFICIENCY ANEMIA, UNSPECIFIED IRON DEFICIENCY ANEMIA TYPE: Primary | ICD-10-CM

## 2024-10-25 ENCOUNTER — TELEPHONE (OUTPATIENT)
Dept: GASTROENTEROLOGY | Facility: CLINIC | Age: 69
End: 2024-10-25
Payer: MEDICARE

## 2024-10-25 NOTE — TELEPHONE ENCOUNTER
----- Message from Kemi Gould sent at 10/24/2024 10:52 PM EDT -----  SARAH still present.  Recommend CT enterography for further evaluation.

## 2024-10-28 DIAGNOSIS — B02.29 POST HERPETIC NEURALGIA: ICD-10-CM

## 2024-10-28 RX ORDER — GABAPENTIN 100 MG/1
200 CAPSULE ORAL NIGHTLY
Qty: 60 CAPSULE | Refills: 2 | Status: SHIPPED | OUTPATIENT
Start: 2024-10-28

## 2024-11-04 ENCOUNTER — OFFICE VISIT (OUTPATIENT)
Dept: FAMILY MEDICINE CLINIC | Facility: CLINIC | Age: 69
End: 2024-11-04
Payer: MEDICARE

## 2024-11-04 VITALS
BODY MASS INDEX: 31.89 KG/M2 | SYSTOLIC BLOOD PRESSURE: 132 MMHG | TEMPERATURE: 97.3 F | WEIGHT: 180 LBS | DIASTOLIC BLOOD PRESSURE: 84 MMHG | HEIGHT: 63 IN | OXYGEN SATURATION: 95 % | HEART RATE: 86 BPM

## 2024-11-04 DIAGNOSIS — E03.9 HYPOTHYROIDISM, UNSPECIFIED TYPE: ICD-10-CM

## 2024-11-04 DIAGNOSIS — R05.2 SUBACUTE COUGH: ICD-10-CM

## 2024-11-04 DIAGNOSIS — F51.01 PRIMARY INSOMNIA: Primary | ICD-10-CM

## 2024-11-04 DIAGNOSIS — J47.0 BRONCHIECTASIS WITH ACUTE LOWER RESPIRATORY INFECTION: ICD-10-CM

## 2024-11-04 PROCEDURE — 1125F AMNT PAIN NOTED PAIN PRSNT: CPT | Performed by: NURSE PRACTITIONER

## 2024-11-04 PROCEDURE — 1160F RVW MEDS BY RX/DR IN RCRD: CPT | Performed by: NURSE PRACTITIONER

## 2024-11-04 PROCEDURE — 1159F MED LIST DOCD IN RCRD: CPT | Performed by: NURSE PRACTITIONER

## 2024-11-04 PROCEDURE — 99214 OFFICE O/P EST MOD 30 MIN: CPT | Performed by: NURSE PRACTITIONER

## 2024-11-04 PROCEDURE — 3079F DIAST BP 80-89 MM HG: CPT | Performed by: NURSE PRACTITIONER

## 2024-11-04 PROCEDURE — 3075F SYST BP GE 130 - 139MM HG: CPT | Performed by: NURSE PRACTITIONER

## 2024-11-04 RX ORDER — FAMOTIDINE 20 MG/1
TABLET, FILM COATED ORAL
COMMUNITY
Start: 2024-10-19

## 2024-11-04 RX ORDER — ZOLPIDEM TARTRATE 6.25 MG/1
6.25 TABLET, FILM COATED, EXTENDED RELEASE ORAL NIGHTLY PRN
Qty: 90 TABLET | Refills: 0 | Status: SHIPPED | OUTPATIENT
Start: 2024-11-04

## 2024-11-04 RX ORDER — BROMPHENIRAMINE MALEATE, PSEUDOEPHEDRINE HYDROCHLORIDE, AND DEXTROMETHORPHAN HYDROBROMIDE 2; 30; 10 MG/5ML; MG/5ML; MG/5ML
5 SYRUP ORAL 4 TIMES DAILY PRN
Qty: 473 ML | Refills: 0 | Status: SHIPPED | OUTPATIENT
Start: 2024-11-04

## 2024-11-04 RX ORDER — LEVOTHYROXINE SODIUM 50 UG/1
50 TABLET ORAL
Qty: 90 TABLET | Refills: 1 | Status: SHIPPED | OUTPATIENT
Start: 2024-11-04

## 2024-11-04 NOTE — PATIENT INSTRUCTIONS
Insomnia  Insomnia is a sleep disorder that makes it difficult to fall asleep or stay asleep. Insomnia can cause fatigue, low energy, difficulty concentrating, mood swings, and poor performance at work or school.  There are three different ways to classify insomnia:  Difficulty falling asleep.  Difficulty staying asleep.  Waking up too early in the morning.  Any type of insomnia can be long-term (chronic) or short-term (acute). Both are common. Short-term insomnia usually lasts for 3 months or less. Chronic insomnia occurs at least three times a week for longer than 3 months.  What are the causes?  Insomnia may be caused by another condition, situation, or substance, such as:  Having certain mental health conditions, such as anxiety and depression.  Using caffeine, alcohol, tobacco, or drugs.  Having gastrointestinal conditions, such as gastroesophageal reflux disease (GERD).  Having certain medical conditions. These include:  Asthma.  Alzheimer's disease.  Stroke.  Chronic pain.  An overactive thyroid gland (hyperthyroidism).  Other sleep disorders, such as restless legs syndrome and sleep apnea.  Menopause.  Sometimes, the cause of insomnia may not be known.  What increases the risk?  Risk factors for insomnia include:  Gender. Females are affected more often than males.  Age. Insomnia is more common as people get older.  Stress and certain medical and mental health conditions.  Lack of exercise.  Having an irregular work schedule. This may include working night shifts and traveling between different time zones.  What are the signs or symptoms?  If you have insomnia, the main symptom is having trouble falling asleep or having trouble staying asleep. This may lead to other symptoms, such as:  Feeling tired or having low energy.  Feeling nervous about going to sleep.  Not feeling rested in the morning.  Having trouble concentrating.  Feeling irritable, anxious, or depressed.  How is this diagnosed?  This condition  may be diagnosed based on:  Your symptoms and medical history. Your health care provider may ask about:  Your sleep habits.  Any medical conditions you have.  Your mental health.  A physical exam.  How is this treated?  Treatment for insomnia depends on the cause. Treatment may focus on treating an underlying condition that is causing the insomnia. Treatment may also include:  Medicines to help you sleep.  Counseling or therapy.  Lifestyle adjustments to help you sleep better.  Follow these instructions at home:  Eating and drinking    Limit or avoid alcohol, caffeinated beverages, and products that contain nicotine and tobacco, especially close to bedtime. These can disrupt your sleep.  Do not eat a large meal or eat spicy foods right before bedtime. This can lead to digestive discomfort that can make it hard for you to sleep.  Sleep habits    Keep a sleep diary to help you and your health care provider figure out what could be causing your insomnia. Write down:  When you sleep.  When you wake up during the night.  How well you sleep and how rested you feel the next day.  Any side effects of medicines you are taking.  What you eat and drink.  Make your bedroom a dark, comfortable place where it is easy to fall asleep.  Put up shades or blackout curtains to block light from outside.  Use a white noise machine to block noise.  Keep the temperature cool.  Limit screen use before bedtime. This includes:  Not watching TV.  Not using your smartphone, tablet, or computer.  Stick to a routine that includes going to bed and waking up at the same times every day and night. This can help you fall asleep faster. Consider making a quiet activity, such as reading, part of your nighttime routine.  Try to avoid taking naps during the day so that you sleep better at night.  Get out of bed if you are still awake after 15 minutes of trying to sleep. Keep the lights down, but try reading or doing a quiet activity. When you feel  sleepy, go back to bed.  General instructions  Take over-the-counter and prescription medicines only as told by your health care provider.  Exercise regularly as told by your health care provider. However, avoid exercising in the hours right before bedtime.  Use relaxation techniques to manage stress. Ask your health care provider to suggest some techniques that may work well for you. These may include:  Breathing exercises.  Routines to release muscle tension.  Visualizing peaceful scenes.  Make sure that you drive carefully. Do not drive if you feel very sleepy.  Keep all follow-up visits. This is important.  Contact a health care provider if:  You are tired throughout the day.  You have trouble in your daily routine due to sleepiness.  You continue to have sleep problems, or your sleep problems get worse.  Get help right away if:  You have thoughts about hurting yourself or someone else.  Get help right away if you feel like you may hurt yourself or others, or have thoughts about taking your own life. Go to your nearest emergency room or:  Call 911.  Call the National Suicide Prevention Lifeline at 1-103.761.3138 or 273. This is open 24 hours a day.  Text the Crisis Text Line at 277258.  Summary  Insomnia is a sleep disorder that makes it difficult to fall asleep or stay asleep.  Insomnia can be long-term (chronic) or short-term (acute).  Treatment for insomnia depends on the cause. Treatment may focus on treating an underlying condition that is causing the insomnia.  Keep a sleep diary to help you and your health care provider figure out what could be causing your insomnia.  This information is not intended to replace advice given to you by your health care provider. Make sure you discuss any questions you have with your health care provider.  Document Revised: 11/28/2022 Document Reviewed: 11/28/2022  Elsevier Patient Education © 2024 [employer]

## 2024-11-13 ENCOUNTER — TELEPHONE (OUTPATIENT)
Dept: FAMILY MEDICINE CLINIC | Facility: CLINIC | Age: 69
End: 2024-11-13
Payer: MEDICARE

## 2024-11-13 ENCOUNTER — HOSPITAL ENCOUNTER (OUTPATIENT)
Dept: CT IMAGING | Facility: HOSPITAL | Age: 69
Discharge: HOME | End: 2024-11-13
Admitting: NURSE PRACTITIONER
Payer: MEDICARE

## 2024-11-13 DIAGNOSIS — Z85.028 HISTORY OF GASTRIC CANCER: ICD-10-CM

## 2024-11-13 DIAGNOSIS — D50.9 IRON DEFICIENCY ANEMIA, UNSPECIFIED IRON DEFICIENCY ANEMIA TYPE: ICD-10-CM

## 2024-11-13 DIAGNOSIS — R19.5 POSITIVE OCCULT STOOL BLOOD TEST: ICD-10-CM

## 2024-11-13 LAB
CREAT BLDA-MCNC: 1.8 MG/DL (ref 0.6–1.3)
EGFRCR SERPLBLD CKD-EPI 2021: 30.6 ML/MIN/1.73

## 2024-11-13 PROCEDURE — 82565 ASSAY OF CREATININE: CPT

## 2024-11-13 PROCEDURE — 74177 CT ABD & PELVIS W/CONTRAST: CPT

## 2024-11-13 PROCEDURE — 25510000001 IOPAMIDOL PER 1 ML: Performed by: NURSE PRACTITIONER

## 2024-11-13 RX ORDER — IOPAMIDOL 755 MG/ML
100 INJECTION, SOLUTION INTRAVASCULAR
Status: COMPLETED | OUTPATIENT
Start: 2024-11-13 | End: 2024-11-13

## 2024-11-13 RX ADMIN — IOPAMIDOL 100 ML: 755 INJECTION, SOLUTION INTRAVENOUS at 13:22

## 2024-11-13 NOTE — TELEPHONE ENCOUNTER
Caller: Jay Gallo    Relationship: Kiah     Best call back number: [phone] or [phone]    Who are you requesting to speak with (clinical staff, provider,  specific staff member):  MA    What was the call regarding: Pt wanted a new flotter valve. Sees Pulm, asked for a new one, but provider is out.  Rep wanted to show MA what it looked like and to see if they had any questions on it.

## 2024-11-18 ENCOUNTER — TELEPHONE (OUTPATIENT)
Dept: GASTROENTEROLOGY | Facility: CLINIC | Age: 69
End: 2024-11-18
Payer: MEDICARE

## 2024-11-18 NOTE — TELEPHONE ENCOUNTER
----- Message from Kemi Gould sent at 11/18/2024 12:53 PM EST -----  Please let pt know that no bleeding is noted on CT.  Mild fatty liver noted.

## 2024-11-21 DIAGNOSIS — J47.9 BRONCHIECTASIS WITHOUT ACUTE EXACERBATION: Primary | ICD-10-CM

## 2024-12-04 NOTE — PROGRESS NOTES
Primary Care Provider  Jeaneth Barker APRN   Referring Provider  No ref. provider found      Patient Complaint  Follow-up (4 Months) and Abnormal chest CT      Subjective          Jeimy Willard presents to [facility]MON[facility]      History of Presenting Illness  Jeimy Willard is a 69 y.o. female patient of Dr. Dumont with bronchiectasis, history of Pseudomonas pneumonia, chronic cough, and history of gastric cancer, here for 4 month follow-up.    Patient states she is doing well since her last visit.  Patient denies using any antibiotics or steroids for her lungs recently, denies any fevers or chills, no ER visits or hospitalizations for her breathing since she was last seen.  Her primary respiratory complaint is chronic cough productive of yellow sputum.  She denies any shortness of breath or significant wheezing.  Patient does not use any maintenance inhalers but does have albuterol nebulizer treatments to use as needed.  She uses tobramycin nebs every other month for history of Pseudomonas initially seen on respiratory culture November 2023 and again in April 2024.  Patient has been tolerating tobramycin well, though her last month of treatment she noticed a bad taste in her mouth and nausea after using.  Patient takes Zyrtec, Astelin, and Flonase for allergies/rhinitis, gets allergy shots every 2 to 3 weeks.  She has never smoked.  Patient denies any hemoptysis, swollen lymph nodes, weight loss, or night sweats.  Overall, patient is doing well and has no additional concerns at this time.  Patient is able to perform ADLs without difficulty.  I have personally reviewed the review of systems, past family, social, medical and surgical histories; and agree with their findings.      Review of Systems    Review of Systems   Constitutional:  Negative for activity change, chills, fatigue, fever, unexpected weight gain and unexpected weight loss.   HENT:  Negative for congestion,  ear discharge, ear pain, mouth sores, postnasal drip, rhinorrhea, sinus pressure, sore throat, swollen glands and trouble swallowing.    Eyes:  Negative for visual disturbance.   Respiratory:  Positive for cough (productive of yellow sputum). Negative for apnea, chest tightness, shortness of breath, wheezing and stridor.    Cardiovascular:  Negative for chest pain, palpitations and leg swelling.   Gastrointestinal:  Negative for abdominal distention, abdominal pain, nausea, vomiting, GERD and indigestion.   Musculoskeletal:  Negative for arthralgias, joint swelling and myalgias.   Skin:  Negative for color change.   Neurological:  Negative for dizziness, weakness, light-headedness and headache.      Sleep: Negative for Excessive daytime sleepiness  Negative for morning headaches  Negative for Snoring      Family History   Problem Relation Age of Onset    Breast cancer Mother     Stroke Mother     Heart disease Mother     Hypertension Mother     Cancer Mother         unspecified    Diabetes Mother         unspecified type    Kidney failure Mother     Arthritis Mother     Kidney disease Mother     Miscarriages / Stillbirths Mother     Heart failure Mother     Diabetes Father         unspecified type    Heart disease Brother     Hypertension Brother     Cancer Brother         unspecified    Diabetes Brother         unspecified type    Kidney disease Brother     Lung cancer Maternal Grandmother         malignant    Alzheimer's disease Maternal Grandfather     Esophageal cancer Paternal Grandmother         malignant    Miscarriages / Stillbirths Sister         Social History     Socioeconomic History    Marital status:    Tobacco Use    Smoking status: Never     Passive exposure: Never    Smokeless tobacco: Never   Vaping Use    Vaping status: Never Used   Substance and Sexual Activity    Alcohol use: Never    Drug use: Never    Sexual activity: Not Currently     Partners: Male     Birth control/protection:  Post-menopausal        Past Medical History:   Diagnosis Date    Allergic rhinitis     Anemia     Arthritis     Cancer     Cholelithiasis 2000    Colon polyp     GERD (gastroesophageal reflux disease)     History of transfusion     Hypertension     Insomnia     Lymphoma 1999    malignant    Osteoporosis     Seasonal allergies     Stomach cancer 2005    malignant    Stomach disorder     Ulcer (traumatic) of oral mucosa     Vitamin D deficiency         Immunization History   Administered Date(s) Administered    ABRYSVO (RSV, 60+ or pregnant women 32-36 wks) 08/05/2024    COVID-19 (PFIZER) Purple Cap Monovalent 03/10/2021, 03/31/2021, 10/22/2021    Covid-19 (Pfizer) Gray Cap Monovalent 04/16/2022    Flu Vaccine Quad PF >36MO 09/26/2020    FluMist 2-49yrs 09/26/2020    Fluzone (or Fluarix & Flulaval for VFC) >6mos 09/17/2021    Fluzone High-Dose 65+YRS 09/06/2024    Fluzone High-Dose 65+yrs 10/04/2023    Fluzone Quad >6mos (Multi-dose) 09/26/2020    Pneumococcal Conjugate 20-Valent (PCV20) 11/23/2022    Shingrix 12/19/2021, 03/11/2022       Allergies   Allergen Reactions    Heparin Other (See Comments)     Fever with chills          Current Outpatient Medications:     acetaminophen (TYLENOL) 325 MG tablet, Take 2 tablets by mouth Every 6 (Six) Hours As Needed., Disp: , Rfl:     albuterol (PROVENTIL) (2.5 MG/3ML) 0.083% nebulizer solution, Take 2.5 mg by nebulization Every 4 (Four) Hours As Needed for Wheezing., Disp: 90 each, Rfl: 2    azelastine (ASTELIN) 0.1 % nasal spray, , Disp: , Rfl:     brompheniramine-pseudoephedrine-DM 30-2-10 MG/5ML syrup, Take 5 mL by mouth 4 (Four) Times a Day As Needed for Allergies., Disp: 473 mL, Rfl: 0    Calcium Carbonate-Vitamin D 600-10 MG-MCG per tablet, Take 1 tablet by mouth 2 (Two) Times a Day., Disp: 180 tablet, Rfl: 1    celecoxib (CeleBREX) 200 MG capsule, Take 1 capsule by mouth Daily., Disp: 90 capsule, Rfl: 1    cetirizine (zyrTEC) 10 MG tablet, , Disp: , Rfl:      cyanocobalamin 1000 MCG/ML injection, , Disp: , Rfl:     Diclofenac Sodium (VOLTAREN) 1 % gel gel, Apply 2 g topically to the appropriate area as directed 4 (Four) Times a Day As Needed (knee pain)., Disp: 100 g, Rfl: 1    diphenhydrAMINE (BENADRYL) 25 mg capsule, Take 1 capsule by mouth Every 6 (Six) Hours As Needed., Disp: , Rfl:     DULoxetine (CYMBALTA) 30 MG capsule, Take 1 capsule by mouth 2 (Two) Times a Day., Disp: 60 capsule, Rfl: 2    EPINEPHrine (EPIPEN) 0.3 MG/0.3ML solution auto-injector injection, , Disp: , Rfl:     famotidine (PEPCID) 20 MG tablet, , Disp: , Rfl:     fluticasone (FLONASE) 50 MCG/ACT nasal spray, , Disp: , Rfl:     folic acid (FOLVITE) 1 MG tablet, Take 1 tablet by mouth Daily., Disp: 90 tablet, Rfl: 3    gabapentin (NEURONTIN) 100 MG capsule, Take 2 capsules by mouth Every Night., Disp: 60 capsule, Rfl: 2    Iron-Vitamin C (Vitron-C)  MG tablet, , Disp: , Rfl:     levothyroxine (SYNTHROID, LEVOTHROID) 50 MCG tablet, Take 1 tablet by mouth Every Morning., Disp: 90 tablet, Rfl: 1    lisinopril (PRINIVIL,ZESTRIL) 10 MG tablet, Take 0.5 tablets by mouth Daily., Disp: , Rfl:     multivitamins-minerals (PRESERVISION AREDS 2) capsule capsule, , Disp: , Rfl:     olopatadine (PATANOL) 0.1 % ophthalmic solution, 1 drop., Disp: , Rfl:     pantoprazole (PROTONIX) 40 MG EC tablet, Take 1 tablet by mouth Daily., Disp: 90 tablet, Rfl: 1    Premarin 0.625 MG/GM vaginal cream, Insert 2 g into the vagina As Needed (Vaginal irritation)., Disp: 30 g, Rfl: 2    Pyridoxine HCl (VITAMIN B-6 PO), , Disp: , Rfl:     tobramycin PF (David) 300 MG/5ML nebulizer solution, Take 5 mL by nebulization 2 (Two) Times a Day. Use daily for 1 month on, one month off., Disp: 50 mL, Rfl: 5    VITAMIN D, CHOLECALCIFEROL, PO, Take 1 tablet by mouth Daily., Disp: , Rfl:     zolpidem CR (AMBIEN CR) 6.25 MG CR tablet, Take 1 tablet by mouth At Night As Needed for Sleep., Disp: 90 tablet, Rfl: 0    Current  "Facility-Administered Medications:     albuterol (PROVENTIL) nebulizer solution 0.042% 1.25 mg/3mL, 1.25 mg, Nebulization, Q6H PRN, Money, Marzena Solomon, APRN     Objective     Vital Signs:   /89 (BP Location: Left arm, Patient Position: Sitting, Cuff Size: Adult)   Pulse 76   Temp 96 °F (35.6 °C) (Temporal)   Resp 18   Ht 160 cm (63\")   Wt 80.7 kg (178 lb)   SpO2 97% Comment: Room air  BMI 31.53 kg/m²     Physical Exam  Constitutional:       General: She is not in acute distress.     Appearance: Normal appearance. She is normal weight. She is not ill-appearing.   HENT:      Right Ear: Tympanic membrane and ear canal normal.      Left Ear: Tympanic membrane and ear canal normal.      Nose: Nose normal.      Mouth/Throat:      Mouth: Mucous membranes are moist.      Pharynx: Oropharynx is clear.   Cardiovascular:      Rate and Rhythm: Normal rate and regular rhythm.      Pulses: Normal pulses.      Heart sounds: Normal heart sounds.   Pulmonary:      Effort: Pulmonary effort is normal. No respiratory distress.      Breath sounds: No stridor. Rhonchi (mild, lower lobes) present. No wheezing or rales.   Musculoskeletal:         General: No swelling. Normal range of motion.      Cervical back: Normal range of motion and neck supple.      Right lower leg: No edema.      Left lower leg: No edema.   Skin:     General: Skin is warm and dry.   Neurological:      General: No focal deficit present.      Mental Status: She is alert and oriented to person, place, and time.      Motor: No weakness.   Psychiatric:         Mood and Affect: Mood normal.         Behavior: Behavior normal.        Result Review :   I have personally reviewed patient's labs and images.  I also reviewed Dr. Dumont's last progress note 8/5/2024.            Diagnoses and all orders for this visit:    1. Bronchiectasis without acute exacerbation (Primary)    2. History of Pseudomonas pneumonia    3. Chronic cough    4. Seasonal allergies    5. " History of gastric cancer       Impression and Plan    -PFTs 3/26/2024 showed normal spirometry without significant response to bronchodilator.  Normal lung volumes, normal DLCO.  -CT chest 2/5/2024 showed stable chronic bronchiectasis and atelectasis of right upper lobe, right middle lobe, and lingula.  There was also bronchial wall thickening in the lower lobe that appears to be worse compared to previous study.  Tree-in-bud opacities and airspace opacity lower lobe compatible with bronchiolitis and atelectasis/pneumonia.  -Continue using tobramycin nebulizer treatments twice daily every other month for history of Pseudomonas infection November 2023 with bronchiectasis.  Patient had been tolerating well, but this past month she experienced a bad taste in her mouth and nausea with the treatments.  Instructed patient to let us know if her next month of tobramycin treatments causes these issues.  -Continue using albuterol nebulizer treatments as needed  -Continue using flutter valve to assist with airway clearance  -Continue taking Zyrtec, nasal sprays for allergies/rhinitis, continue with allergy shots every 2 to 3 weeks  -Follow-up with Dr. Dumont or myself in 4 months, may return sooner if needed    Smoking status: Reviewed  Vaccination status: Patient reports she is up-to-date with her flu, pneumonia, and Covid vaccines.  Patient is advised to continue to follow CDC recommendations such as social distancing wearing a mask and washing hands for at least 20 seconds.  Medications personally reviewed    Follow Up   No follow-ups on file.  Patient was given instructions and counseling regarding her condition or for health maintenance advice. Please see specific information pulled into the AVS if appropriate.

## 2024-12-05 ENCOUNTER — OFFICE VISIT (OUTPATIENT)
Dept: PULMONOLOGY | Facility: CLINIC | Age: 69
End: 2024-12-05
Payer: MEDICARE

## 2024-12-05 VITALS
DIASTOLIC BLOOD PRESSURE: 89 MMHG | TEMPERATURE: 96 F | WEIGHT: 178 LBS | RESPIRATION RATE: 18 BRPM | HEIGHT: 63 IN | SYSTOLIC BLOOD PRESSURE: 148 MMHG | HEART RATE: 76 BPM | BODY MASS INDEX: 31.54 KG/M2 | OXYGEN SATURATION: 97 %

## 2024-12-05 DIAGNOSIS — R05.3 CHRONIC COUGH: ICD-10-CM

## 2024-12-05 DIAGNOSIS — Z85.028 HISTORY OF GASTRIC CANCER: ICD-10-CM

## 2024-12-05 DIAGNOSIS — J47.9 BRONCHIECTASIS WITHOUT ACUTE EXACERBATION: Primary | ICD-10-CM

## 2024-12-05 DIAGNOSIS — Z87.01 HISTORY OF PSEUDOMONAS PNEUMONIA: ICD-10-CM

## 2024-12-05 DIAGNOSIS — J30.2 SEASONAL ALLERGIES: ICD-10-CM

## 2024-12-05 PROCEDURE — 1159F MED LIST DOCD IN RCRD: CPT

## 2024-12-05 PROCEDURE — 3079F DIAST BP 80-89 MM HG: CPT

## 2024-12-05 PROCEDURE — 1160F RVW MEDS BY RX/DR IN RCRD: CPT

## 2024-12-05 PROCEDURE — 99214 OFFICE O/P EST MOD 30 MIN: CPT

## 2024-12-05 PROCEDURE — 3077F SYST BP >= 140 MM HG: CPT

## 2024-12-06 ENCOUNTER — OFFICE VISIT (OUTPATIENT)
Dept: ORTHOPEDIC SURGERY | Facility: CLINIC | Age: 69
End: 2024-12-06
Payer: MEDICARE

## 2024-12-06 VITALS
SYSTOLIC BLOOD PRESSURE: 144 MMHG | BODY MASS INDEX: 31.54 KG/M2 | DIASTOLIC BLOOD PRESSURE: 88 MMHG | HEIGHT: 63 IN | HEART RATE: 82 BPM | OXYGEN SATURATION: 96 % | WEIGHT: 178 LBS

## 2024-12-06 DIAGNOSIS — Z47.89 AFTERCARE FOLLOWING SURGERY OF THE MUSCULOSKELETAL SYSTEM: ICD-10-CM

## 2024-12-06 DIAGNOSIS — M25.562 LEFT KNEE PAIN, UNSPECIFIED CHRONICITY: Primary | ICD-10-CM

## 2024-12-06 NOTE — PROGRESS NOTES
"Chief Complaint  Follow-up of the Left Knee     Subjective      Jeimy Willard presents to [facility] for a follow up for her left knee. She had her left knee arthroplasty done in 2020. She presents today for her yearly exam. She is overall doing well. She had shingles last year and states this gave her problems. She is ambulating today with a cane.     Allergies   Allergen Reactions    Heparin Other (See Comments)     Fever with chills        Social History     Socioeconomic History    Marital status:    Tobacco Use    Smoking status: Never     Passive exposure: Never    Smokeless tobacco: Never   Vaping Use    Vaping status: Never Used   Substance and Sexual Activity    Alcohol use: Never    Drug use: Never    Sexual activity: Not Currently     Partners: Male     Birth control/protection: Post-menopausal        I reviewed the patient's chief complaint, history of present illness, review of systems, past medical history, surgical history, family history, social history, medications, and allergy list.     Review of Systems     Constitutional: Denies fevers, chills, weight loss  Cardiovascular: Denies chest pain, shortness of breath  Skin: Denies rashes, acute skin changes  Neurologic: Denies headache, loss of consciousness  MSK: lEft knee pain       Vital Signs:   /88   Pulse 82   Ht 160 cm (62.99\")   Wt 80.7 kg (178 lb)   SpO2 96%   BMI 31.54 kg/m²            Ortho Exam    Physical Exam  General:Alert. No acute distress     Left lower extremity: well healed surgical incision, full extension, flexion to 120 degrees, stable to varus/valgus stress, stable to anterior/posterior drawer , non tender to the medial joint line , lateral joint line, neurovascularly intact , positive  pulses, positive EHL, FHL, GS, and TA. Sensation intact to all 5 nerves of the foot.     Procedures    X-Ray Report:  Left knee X-Ray  Indication: Evaluation of left knee pain   AP/Lateral and " [place]ee view(s)  Findings: intact left knee  replacement. no signs of loosening, subsidence or periprosthetic fracture  Prior studies available for comparison: yes       Imaging Results (Most Recent)       Procedure Component Value Units Date/Time    XR Knee 3 View Left [[id number]] Resulted: 12/06/24 0932     Updated: 12/06/24 0935             Result Review :       CT Enterography Abdomen Pelvis w Contrast    Result Date: 11/16/2024  Narrative: CT ABDOMEN PELVIS W CONTRAST ENTEROGRAPHY Date of Exam: 11/13/2024 1:20 PM EST Indication: assess small bowel (SARAH). Comparison: CT abdomen pelvis 2/5/2024 Technique: Axial CT images were obtained of the abdomen and pelvis after the uneventful intravenous administration of iodinated contrast.  Neutral oral contrast was also administered for the enterography protocol.  Reconstructed coronal and sagittal images were also obtained. Automated exposure control and iterative construction methods were used. Findings: Bronchiectasis with bronchial wall thickening in the lower lobes is improved in comparison to 2/5/2024. No consolidation or mass is evident. Extensive coronary artery calcifications are evident. The liver is of normal size. The liver is of diffusely diminished density consistent with mild fatty infiltration. The gallbladder is absent, surgical clips are seen in the gallbladder bed. No pancreatic or adrenal mass is evident. The spleen is of normal size. The kidneys enhance bilaterally. No renal or ureteral stones are seen. There is no evidence of hydronephrosis. The urinary bladder is not abnormally distended. Postsurgical changes are seen in the stomach. The gastric remnant is not abnormally distended. No abnormality is seen at the small bowel anastomosis. Small bowel loops are well distended. No mural thickening or surrounding inflammatory change is evident.  No significant stool is evident. No diverticula are evident. Small lymph nodes in the upper abdomen and in the  small bowel mesentery appear stable. The anterior abdominal wall appears intact, no hernia is evident. Mild anterior wedging deformity of the superior endplate of L1 is stable.     Impression: Impression: CT scan of the abdomen and pelvis with oral and IV contrast demonstrating significant improvement in bronchiectasis with bronchial wall thickening in the lower lobes in comparison to 2/5/2024. Fatty liver. Post cholecystectomy and partial gastrectomy. Small abdominal and mesenteric lymph nodes are stable. Electronically Signed: Javed Henriquez MD  11/16/2024 10:07 AM EST  Workstation ID: [id number]            Assessment and Plan     Diagnoses and all orders for this visit:    1. Left knee pain, unspecified chronicity (Primary)  -     XR Knee 3 View Left    2. Aftercare following surgery of Left Total Knee Arthroplasty, 11/4/2020      The patient presents here today for a follow up for her left knee arthroplasty. X-rays were obtained in the office today and these were reviewed today.     She will continue activities as tolerated and home exercises given today.     Call or return if worsening symptoms.    Follow Up     PRN     Patient was given instructions and counseling regarding her condition or for health maintenance advice. Please see specific information pulled into the AVS if appropriate.     Scribed for Chuck Valdez MD by Ela Martínez.  12/06/24   09:57 EST    I have personally performed the services described in this document as scribed by the above individual and it is both accurate and complete. Chuck Valdez MD 12/08/24

## 2025-01-23 ENCOUNTER — TELEPHONE (OUTPATIENT)
Dept: FAMILY MEDICINE CLINIC | Facility: CLINIC | Age: 70
End: 2025-01-23
Payer: MEDICARE

## 2025-01-23 NOTE — TELEPHONE ENCOUNTER
----- Message from Lissette Dela Cruz sent at 2/10/2022  8:52 AM CST -----  Regarding: Medication  Contact: patient  Per phone call with patient, she stated that her mail order pharmacy has a delay in her receiving her medication and the caller wanted to know if the physician ca call in 4 or 5 pills of her medication until her medication comes in because she is out of the venlafaxine (EFFEXOR-XR) 37.5 MG 24 hr capsule.  Please return call at [phone].      The pharmacy she used is [facility] [address] [place] [address] - [address] [address]  Phone: [phone] Fax: [phone]    DESTIN Driver       Pt notified

## 2025-01-23 NOTE — TELEPHONE ENCOUNTER
Caller: Jeimy Willard    Relationship: Self    Best call back number: [phone]     What medication are you requesting: ANTIVIRAL CREAM    What are your current symptoms: RASH AND RED DOTS ON ARM    How long have you been experiencing symptoms: TWO DAYS    Have you had these symptoms before:    [x] Yes  [] No    Have you been treated for these symptoms before:   [x] Yes  [] No    If a prescription is needed, what is your preferred pharmacy and phone number: [id number] - [place], [place] - [address] AT [facility] OF [address] & [place] - [phone]  - [phone] FX     Additional notes: PATIENT BELIEVES SHE IS GETTING SHINGLES AGAIN

## 2025-01-24 ENCOUNTER — TELEPHONE (OUTPATIENT)
Dept: FAMILY MEDICINE CLINIC | Facility: CLINIC | Age: 70
End: 2025-01-24
Payer: MEDICARE

## 2025-01-24 DIAGNOSIS — B02.29 POST HERPETIC NEURALGIA: ICD-10-CM

## 2025-01-24 DIAGNOSIS — M54.42 ACUTE LEFT-SIDED LOW BACK PAIN WITH LEFT-SIDED SCIATICA: ICD-10-CM

## 2025-01-24 RX ORDER — DULOXETIN HYDROCHLORIDE 30 MG/1
30 CAPSULE, DELAYED RELEASE ORAL 2 TIMES DAILY
Qty: 60 CAPSULE | Refills: 2 | Status: SHIPPED | OUTPATIENT
Start: 2025-01-24

## 2025-01-24 RX ORDER — ACYCLOVIR 400 MG/1
400 TABLET ORAL
Qty: 28 TABLET | Refills: 0 | Status: SHIPPED | OUTPATIENT
Start: 2025-01-24

## 2025-01-27 NOTE — PROGRESS NOTES
Chief Complaint  Insomnia (FOLLOW UP), Medication Problem (Is there another medication like /albuterol (PROVENTIL) (2.5 MG/3ML) 0.083% nebulizer solution /That she could use that would not make her shaky after administration), and Itching (Had previously prescribed medication that was a pill, can not remember name would like refill)    Subjective          Jeimy Willard is a 69 y.o. female who presents to [facility]    History of Present Illness  History of Present Illness  The patient presents for evaluation of shingles, cough, wheezing, sleep issues.    She reports a recurrence of shingles, characterized by a red patch with palpable bumps. She has developed a similar lesion on her hip, which remains elevated. Additionally, she notes the presence of scar tissue behind her knee, which was the initial site of intense itching that persists to date. She also experiences persistent itching on her foot, exacerbated by touch. She recalls the previous episode of shingles extending from her hip to her foot. She describes neuropathic numbness-type pain, which is manageable and does not impede her mobility. She finds relief from the pain through movement and occasional rubbing. She has been using hydroxyzine for itching and an antibiotic.    She reports experiencing tremors for approximately 1.5 hours following the administration of albuterol, which significantly impairs her ability to perform fine motor tasks. She uses albuterol in conjunction with tobramycin every other month and as needed for wheezing or coughing. She recently completed a course of tobramycin.    She reports no chest pain or shortness of breath. Her bowel and bladder functions are normal. She has been experiencing leg swelling, which she initially attributed to an ear problem. However, she reports no ear pain or dental issues. The swelling resolved spontaneously but recurred 3 to 4 days ago before resolving again. She had  dental x-rays 2 weeks ago, which did not reveal any abscesses. She continues to undergo scans with her oncologist.    She is requesting a refill of her cough medicine. She reports satisfactory sleep with Ambien.     MEDICATIONS  Current: Ambien, hydroxyzine, albuterol, tobramycin, trazodone      The PHQ has not been completed during this encounter.               Health Maintenance Due   Topic Date Due    DIABETIC FOOT EXAM  Never done    TDAP/TD VACCINES (1 - Tdap) Never done    DIABETIC EYE EXAM  07/30/2019    HEMOGLOBIN A1C  06/22/2021    COVID-19 Vaccine (5 - 2024-25 season) 09/01/2024        Review of Systems   Constitutional:  Negative for chills, fatigue and fever.   Respiratory:  Negative for cough and shortness of breath.    Cardiovascular:  Negative for chest pain and palpitations.   Gastrointestinal:  Negative for constipation, diarrhea, nausea and vomiting.   Musculoskeletal:  Negative for back pain and neck pain.   Skin:  Positive for rash.        Pruritus     Neurological:  Positive for numbness. Negative for dizziness and headaches.          Medical History: has a past medical history of Allergic rhinitis, Anemia, Arthritis, Cancer, Cholelithiasis (2000), Colon polyp, GERD (gastroesophageal reflux disease), History of transfusion, Hypertension, Insomnia, Lymphoma (1999), Osteoporosis, Seasonal allergies, Stomach cancer (2005), Stomach disorder, Ulcer (traumatic) of oral mucosa, and Vitamin D deficiency.     Surgical History: has a past surgical history that includes Cholecystectomy; Colonoscopy (02/20/2019); Esophagogastroduodenoscopy (03/13/2020); Gallbladder surgery; Laparoscopic total gastrectomy; Replacement total knee (Left, 2020); Esophagogastroduodenoscopy (N/A, 10/16/2024); and Colonoscopy (N/A, 10/16/2024).     Family History: family history includes Alzheimer's disease in her maternal grandfather; Arthritis in her mother; Breast cancer in her mother; Cancer in her brother and mother; Diabetes  in her brother, father, and mother; Esophageal cancer in her paternal grandmother; Heart disease in her brother and mother; Heart failure in her mother; Hypertension in her brother and mother; Kidney disease in her brother and mother; Kidney failure in her mother; Lung cancer in her maternal grandmother; Miscarriages / Stillbirths in her mother and sister; Stroke in her mother.     Social History: reports that she has never smoked. She has never been exposed to tobacco smoke. She has never used smokeless tobacco. She reports that she does not drink alcohol and does not use drugs.    Allergies: Heparin      Current Outpatient Medications:     acetaminophen (TYLENOL) 325 MG tablet, Take 2 tablets by mouth Every 6 (Six) Hours As Needed., Disp: , Rfl:     azelastine (ASTELIN) 0.1 % nasal spray, , Disp: , Rfl:     brompheniramine-pseudoephedrine-DM 30-2-10 MG/5ML syrup, Take 5 mL by mouth 4 (Four) Times a Day As Needed for Allergies., Disp: 473 mL, Rfl: 0    Calcium Carbonate-Vitamin D 600-10 MG-MCG per tablet, Take 1 tablet by mouth 2 (Two) Times a Day., Disp: 180 tablet, Rfl: 1    celecoxib (CeleBREX) 200 MG capsule, Take 1 capsule by mouth Daily., Disp: 90 capsule, Rfl: 1    cetirizine (zyrTEC) 10 MG tablet, , Disp: , Rfl:     cyanocobalamin 1000 MCG/ML injection, , Disp: , Rfl:     Diclofenac Sodium (VOLTAREN) 1 % gel gel, Apply 2 g topically to the appropriate area as directed 4 (Four) Times a Day As Needed (knee pain)., Disp: 100 g, Rfl: 1    diphenhydrAMINE (BENADRYL) 25 mg capsule, Take 1 capsule by mouth Every 6 (Six) Hours As Needed., Disp: , Rfl:     DULoxetine (CYMBALTA) 30 MG capsule, TAKE 1 CAPSULE BY MOUTH TWICE DAILY, Disp: 60 capsule, Rfl: 2    famotidine (PEPCID) 20 MG tablet, , Disp: , Rfl:     fluticasone (FLONASE) 50 MCG/ACT nasal spray, , Disp: , Rfl:     folic acid (FOLVITE) 1 MG tablet, Take 1 tablet by mouth Daily., Disp: 90 tablet, Rfl: 3    gabapentin (NEURONTIN) 100 MG capsule, Take 2 capsules  by mouth Every Night., Disp: 60 capsule, Rfl: 2    Iron-Vitamin C (Vitron-C)  MG tablet, , Disp: , Rfl:     levothyroxine (SYNTHROID, LEVOTHROID) 50 MCG tablet, TAKE 1 TABLET BY MOUTH EVERY MORNING, Disp: 90 tablet, Rfl: 1    lisinopril (PRINIVIL,ZESTRIL) 10 MG tablet, Take 0.5 tablets by mouth Daily., Disp: , Rfl:     multivitamins-minerals (PRESERVISION AREDS 2) capsule capsule, , Disp: , Rfl:     olopatadine (PATANOL) 0.1 % ophthalmic solution, 1 drop., Disp: , Rfl:     pantoprazole (PROTONIX) 40 MG EC tablet, Take 1 tablet by mouth Daily., Disp: 90 tablet, Rfl: 1    Premarin 0.625 MG/GM vaginal cream, Insert 2 g into the vagina As Needed (Vaginal irritation)., Disp: 30 g, Rfl: 2    Pyridoxine HCl (VITAMIN B-6 PO), , Disp: , Rfl:     tobramycin PF (David) 300 MG/5ML nebulizer solution, Take 5 mL by nebulization 2 (Two) Times a Day. Use daily for 1 month on, one month off., Disp: 50 mL, Rfl: 5    VITAMIN D, CHOLECALCIFEROL, PO, Take 1 tablet by mouth Daily., Disp: , Rfl:     zolpidem CR (AMBIEN CR) 6.25 MG CR tablet, Take 1 tablet by mouth At Night As Needed for Sleep., Disp: 90 tablet, Rfl: 0    EPINEPHrine (EPIPEN) 0.3 MG/0.3ML solution auto-injector injection, , Disp: , Rfl:     hydrOXYzine (ATARAX) 25 MG tablet, Take 1 tablet by mouth Every 6 (Six) Hours As Needed for Itching., Disp: 30 tablet, Rfl: 1    levalbuterol (Xopenex) 1.25 MG/3ML nebulizer solution, Take 1 ampule by nebulization Every 6 (Six) Hours As Needed for Wheezing., Disp: 90 each, Rfl: 2    Current Facility-Administered Medications:     albuterol (PROVENTIL) nebulizer solution 0.042% 1.25 mg/3mL, 1.25 mg, Nebulization, Q6H PRN, Money, Marzena Neha, APRN      Immunization History   Administered Date(s) Administered    ABRYSVO (RSV, 60+ or pregnant women 32-36 wks) 08/05/2024    COVID-19 (PFIZER) Purple Cap Monovalent 03/10/2021, 03/31/2021, 10/22/2021    Covid-19 (Pfizer) Gray Cap Monovalent 04/16/2022    Flu Vaccine Quad PF >36MO 09/26/2020  "   FluMist 2-49yrs 09/26/2020    Fluzone (or Fluarix & Flulaval for VFC) >6mos 09/17/2021    Fluzone High-Dose 65+YRS 09/06/2024    Fluzone High-Dose 65+yrs 10/04/2023    Fluzone Quad >6mos (Multi-dose) 09/26/2020    Pneumococcal Conjugate 20-Valent (PCV20) 11/23/2022    Shingrix 12/19/2021, 03/11/2022         Objective       Vitals:    02/03/25 0844   BP: 130/82   Pulse: 78   Temp: 97.7 °F (36.5 °C)   TempSrc: Temporal   SpO2: 98%   Weight: 84.6 kg (186 lb 9.6 oz)   Height: 160 cm (62.99\")      Body mass index is 33.06 kg/m².   Wt Readings from Last 3 Encounters:   02/03/25 84.6 kg (186 lb 9.6 oz)   12/06/24 80.7 kg (178 lb)   12/05/24 80.7 kg (178 lb)      BP Readings from Last 3 Encounters:   02/03/25 130/82   12/06/24 144/88   12/05/24 148/89             Physical Exam  Vitals reviewed.   Constitutional:       Appearance: Normal appearance. She is well-developed.   HENT:      Head: Normocephalic and atraumatic.   Eyes:      Conjunctiva/sclera: Conjunctivae normal.      Pupils: Pupils are equal, round, and reactive to light.   Cardiovascular:      Rate and Rhythm: Normal rate and regular rhythm.      Heart sounds: Normal heart sounds. No murmur heard.  Pulmonary:      Effort: Pulmonary effort is normal.      Breath sounds: Normal breath sounds. No wheezing or rhonchi.   Abdominal:      General: Bowel sounds are normal. There is no distension.      Palpations: Abdomen is soft.      Tenderness: There is no abdominal tenderness.   Skin:     General: Skin is warm and dry.   Neurological:      Mental Status: She is alert and oriented to person, place, and time.   Psychiatric:         Mood and Affect: Mood and affect normal.         Behavior: Behavior normal.         Thought Content: Thought content normal.         Judgment: Judgment normal.         Physical Exam  Ears and mouth were examined.  Lungs were auscultated.  Abdomen was examined.  No lower extremity edema.    Vital Signs  Blood pressure is 130/82.      Result " Review :   Results           Common labs          7/17/2024    08:16 10/22/2024    12:34 11/13/2024    13:09   Common Labs   Glucose 68      BUN 23      Creatinine 1.22   1.80    Sodium 135      Potassium 4.3      Chloride 102      Calcium 9.2      WBC  6.92     Hemoglobin  10.8     Hematocrit  34.1     Platelets  323                      Assessment and Plan        Diagnoses and all orders for this visit:    1. Breast cancer screening by mammogram (Primary)  -     Mammo Screening Digital Tomosynthesis Bilateral With CAD; Future    2. Subacute cough  -     brompheniramine-pseudoephedrine-DM 30-2-10 MG/5ML syrup; Take 5 mL by mouth 4 (Four) Times a Day As Needed for Allergies.  Dispense: 473 mL; Refill: 0    3. Bronchiectasis with acute lower respiratory infection  Comments:  albuterol bid x 5 days and prn  Orders:  -     brompheniramine-pseudoephedrine-DM 30-2-10 MG/5ML syrup; Take 5 mL by mouth 4 (Four) Times a Day As Needed for Allergies.  Dispense: 473 mL; Refill: 0    4. Primary insomnia  -     zolpidem CR (AMBIEN CR) 6.25 MG CR tablet; Take 1 tablet by mouth At Night As Needed for Sleep.  Dispense: 90 tablet; Refill: 0    5. Pruritus  -     hydrOXYzine (ATARAX) 25 MG tablet; Take 1 tablet by mouth Every 6 (Six) Hours As Needed for Itching.  Dispense: 30 tablet; Refill: 1    Other orders  -     levalbuterol (Xopenex) 1.25 MG/3ML nebulizer solution; Take 1 ampule by nebulization Every 6 (Six) Hours As Needed for Wheezing.  Dispense: 90 each; Refill: 2        Assessment & Plan  1. Shingles.  The patient's symptoms, including red patches, raised bumps, and itching, are consistent with shingles. Hydroxyzine will be prescribed to manage the itching.    2. Cough.  A refill of her cough medicine will be provided.    3. Wheezing.  Albuterol will be discontinued due to side effects, and Xopenex will be prescribed as an alternative. A prescription for 90 doses with 2 refills will be sent to [facility].    4. Sleep  issues.  A refill of Ambien will be provided. Trazodone 50 mg, 1-2 tablets, will also be prescribed to aid sleep.    5. Health maintenance.  Her cholesterol levels were within normal limits in June 2024, negating the need for a repeat test. Her blood pressure is well-controlled at 130/82. Her urine drug screen is current. A mammogram has been scheduled for October 2024.    Obtained a written consent for LEESA query. Discussed the risks and benefits of the use of controlled substances with the patient, including the risk of tolerance and drug dependence.  The patient has been counseled on the need to have an exit strategy, including potentially discontinuing the use of controlled substances.  LEESA has or will be reviewed as soon as it becomes available.    Follow-up  The patient will follow up in 3 months.    Return in about 3 months (around 5/3/2025) for Next scheduled follow up.    Patient was given instructions and counseling regarding her condition or for health maintenance advice. Please see specific information pulled into the AVS if appropriate.     GUNJAN Malloy    Patient or patient representative verbalized consent for the use of Ambient Listening during the visit with  GUNJAN Malloy for chart documentation. 2/3/2025  09:56 EST

## 2025-01-29 DIAGNOSIS — E03.9 HYPOTHYROIDISM, UNSPECIFIED TYPE: ICD-10-CM

## 2025-01-29 RX ORDER — LEVOTHYROXINE SODIUM 50 UG/1
50 TABLET ORAL
Qty: 90 TABLET | Refills: 1 | Status: SHIPPED | OUTPATIENT
Start: 2025-01-29

## 2025-02-03 ENCOUNTER — OFFICE VISIT (OUTPATIENT)
Dept: FAMILY MEDICINE CLINIC | Facility: CLINIC | Age: 70
End: 2025-02-03
Payer: MEDICARE

## 2025-02-03 VITALS
OXYGEN SATURATION: 98 % | HEART RATE: 78 BPM | SYSTOLIC BLOOD PRESSURE: 130 MMHG | WEIGHT: 186.6 LBS | HEIGHT: 63 IN | DIASTOLIC BLOOD PRESSURE: 82 MMHG | TEMPERATURE: 97.7 F | BODY MASS INDEX: 33.06 KG/M2

## 2025-02-03 DIAGNOSIS — R05.2 SUBACUTE COUGH: ICD-10-CM

## 2025-02-03 DIAGNOSIS — F51.01 PRIMARY INSOMNIA: ICD-10-CM

## 2025-02-03 DIAGNOSIS — J47.0 BRONCHIECTASIS WITH ACUTE LOWER RESPIRATORY INFECTION: ICD-10-CM

## 2025-02-03 DIAGNOSIS — L29.9 PRURITUS: ICD-10-CM

## 2025-02-03 DIAGNOSIS — Z12.31 BREAST CANCER SCREENING BY MAMMOGRAM: Primary | ICD-10-CM

## 2025-02-03 RX ORDER — ZOLPIDEM TARTRATE 6.25 MG/1
6.25 TABLET, FILM COATED, EXTENDED RELEASE ORAL NIGHTLY PRN
Qty: 90 TABLET | Refills: 0 | Status: SHIPPED | OUTPATIENT
Start: 2025-02-03

## 2025-02-03 RX ORDER — HYDROXYZINE HYDROCHLORIDE 25 MG/1
25 TABLET, FILM COATED ORAL EVERY 6 HOURS PRN
Qty: 30 TABLET | Refills: 1 | Status: SHIPPED | OUTPATIENT
Start: 2025-02-03

## 2025-02-03 RX ORDER — LEVALBUTEROL INHALATION SOLUTION 1.25 MG/3ML
1 SOLUTION RESPIRATORY (INHALATION) EVERY 6 HOURS PRN
Qty: 90 EACH | Refills: 2 | Status: SHIPPED | OUTPATIENT
Start: 2025-02-03

## 2025-02-03 RX ORDER — BROMPHENIRAMINE MALEATE, PSEUDOEPHEDRINE HYDROCHLORIDE, AND DEXTROMETHORPHAN HYDROBROMIDE 2; 30; 10 MG/5ML; MG/5ML; MG/5ML
5 SYRUP ORAL 4 TIMES DAILY PRN
Qty: 473 ML | Refills: 0 | Status: SHIPPED | OUTPATIENT
Start: 2025-02-03

## 2025-02-03 NOTE — PATIENT INSTRUCTIONS
Insomnia  Insomnia is a sleep disorder that makes it difficult to fall asleep or stay asleep. Insomnia can cause fatigue, low energy, difficulty concentrating, mood swings, and poor performance at work or school.  There are three different ways to classify insomnia:  Difficulty falling asleep.  Difficulty staying asleep.  Waking up too early in the morning.  Any type of insomnia can be long-term (chronic) or short-term (acute). Both are common. Short-term insomnia usually lasts for 3 months or less. Chronic insomnia occurs at least three times a week for longer than 3 months.  What are the causes?  Insomnia may be caused by another condition, situation, or substance, such as:  Having certain mental health conditions, such as anxiety and depression.  Using caffeine, alcohol, tobacco, or drugs.  Having gastrointestinal conditions, such as gastroesophageal reflux disease (GERD).  Having certain medical conditions. These include:  Asthma.  Alzheimer's disease.  Stroke.  Chronic pain.  An overactive thyroid gland (hyperthyroidism).  Other sleep disorders, such as restless legs syndrome and sleep apnea.  Menopause.  Sometimes, the cause of insomnia may not be known.  What increases the risk?  Risk factors for insomnia include:  Gender. Females are affected more often than males.  Age. Insomnia is more common as people get older.  Stress and certain medical and mental health conditions.  Lack of exercise.  Having an irregular work schedule. This may include working night shifts and traveling between different time zones.  What are the signs or symptoms?  If you have insomnia, the main symptom is having trouble falling asleep or having trouble staying asleep. This may lead to other symptoms, such as:  Feeling tired or having low energy.  Feeling nervous about going to sleep.  Not feeling rested in the morning.  Having trouble concentrating.  Feeling irritable, anxious, or depressed.  How is this diagnosed?  This condition  may be diagnosed based on:  Your symptoms and medical history. Your health care provider may ask about:  Your sleep habits.  Any medical conditions you have.  Your mental health.  A physical exam.  How is this treated?  Treatment for insomnia depends on the cause. Treatment may focus on treating an underlying condition that is causing the insomnia. Treatment may also include:  Medicines to help you sleep.  Counseling or therapy.  Lifestyle adjustments to help you sleep better.  Follow these instructions at home:  Eating and drinking    Limit or avoid alcohol, caffeinated beverages, and products that contain nicotine and tobacco, especially close to bedtime. These can disrupt your sleep.  Do not eat a large meal or eat spicy foods right before bedtime. This can lead to digestive discomfort that can make it hard for you to sleep.  Sleep habits    Keep a sleep diary to help you and your health care provider figure out what could be causing your insomnia. Write down:  When you sleep.  When you wake up during the night.  How well you sleep and how rested you feel the next day.  Any side effects of medicines you are taking.  What you eat and drink.  Make your bedroom a dark, comfortable place where it is easy to fall asleep.  Put up shades or blackout curtains to block light from outside.  Use a white noise machine to block noise.  Keep the temperature cool.  Limit screen use before bedtime. This includes:  Not watching TV.  Not using your smartphone, tablet, or computer.  Stick to a routine that includes going to bed and waking up at the same times every day and night. This can help you fall asleep faster. Consider making a quiet activity, such as reading, part of your nighttime routine.  Try to avoid taking naps during the day so that you sleep better at night.  Get out of bed if you are still awake after 15 minutes of trying to sleep. Keep the lights down, but try reading or doing a quiet activity. When you feel  sleepy, go back to bed.  General instructions  Take over-the-counter and prescription medicines only as told by your health care provider.  Exercise regularly as told by your health care provider. However, avoid exercising in the hours right before bedtime.  Use relaxation techniques to manage stress. Ask your health care provider to suggest some techniques that may work well for you. These may include:  Breathing exercises.  Routines to release muscle tension.  Visualizing peaceful scenes.  Make sure that you drive carefully. Do not drive if you feel very sleepy.  Keep all follow-up visits. This is important.  Contact a health care provider if:  You are tired throughout the day.  You have trouble in your daily routine due to sleepiness.  You continue to have sleep problems, or your sleep problems get worse.  Get help right away if:  You have thoughts about hurting yourself or someone else.  Get help right away if you feel like you may hurt yourself or others, or have thoughts about taking your own life. Go to your nearest emergency room or:  Call 911.  Call the National Suicide Prevention Lifeline at 1-974.519.6883 or 786. This is open 24 hours a day.  Text the Crisis Text Line at 350074.  Summary  Insomnia is a sleep disorder that makes it difficult to fall asleep or stay asleep.  Insomnia can be long-term (chronic) or short-term (acute).  Treatment for insomnia depends on the cause. Treatment may focus on treating an underlying condition that is causing the insomnia.  Keep a sleep diary to help you and your health care provider figure out what could be causing your insomnia.  This information is not intended to replace advice given to you by your health care provider. Make sure you discuss any questions you have with your health care provider.  Document Revised: 11/28/2022 Document Reviewed: 11/28/2022  Elsevier Patient Education © 2024 [employer]vier Inc.

## 2025-02-24 ENCOUNTER — TELEPHONE (OUTPATIENT)
Dept: PULMONOLOGY | Facility: CLINIC | Age: 70
End: 2025-02-24

## 2025-02-24 NOTE — TELEPHONE ENCOUNTER
Caller: Jeimy Willard    Relationship: Self    Best call back number: [phone]     Requested Prescriptions:   Requested Prescriptions      No prescriptions requested or ordered in this encounter     TOBRAMYCIN    Pharmacy where request should be sent:    [facility]    Last office visit with prescribing clinician: 12/5/2024     Next office visit with prescribing clinician: 4/7/2025     Does the patient have less than a 3 day supply:  [x] Yes  [] No    Would you like a call back once the refill request has been completed: [x] Yes [] No    If the office needs to give you a call back, can they leave a voicemail: [x] Yes [] No    [facility] Mg Rep   02/24/25 10:28 EST

## 2025-02-27 RX ORDER — TOBRAMYCIN INHALATION SOLUTION 300 MG/5ML
300 INHALANT RESPIRATORY (INHALATION)
Qty: 50 ML | Refills: 5 | Status: SHIPPED | OUTPATIENT
Start: 2025-02-27

## 2025-03-03 NOTE — TELEPHONE ENCOUNTER
Spoke with Ester- Pharmacist at [facility] prescription sent was for 50mL for 30 days. Informed pharmacist. 300mL should be dispensed as prescription is take 5mL two times a day one month on and one month off. Pharmacist understood.

## 2025-03-03 NOTE — TELEPHONE ENCOUNTER
Hub staff attempted to follow warm transfer process and was unsuccessful     Caller: [facility] - [place], [place] - [address] - [phone]  - [phone] FX    Relationship to patient: Pharmacy    Best call back number: [phone]    Patient is needing: CALLING TO GET CLARIFICATION ON THE MEDICATION tobramycin PF (David) 300 MG/5ML nebulizer solution  ASKED FOR A CALL BACK TODAY

## 2025-03-08 DIAGNOSIS — B02.29 POST HERPETIC NEURALGIA: ICD-10-CM

## 2025-03-10 ENCOUNTER — TRANSCRIBE ORDERS (OUTPATIENT)
Dept: LAB | Facility: HOSPITAL | Age: 70
End: 2025-03-10
Payer: MEDICARE

## 2025-03-10 ENCOUNTER — LAB (OUTPATIENT)
Dept: LAB | Facility: HOSPITAL | Age: 70
End: 2025-03-10
Payer: MEDICARE

## 2025-03-10 DIAGNOSIS — H35.3131 EARLY STAGE NONEXUDATIVE AGE-RELATED MACULAR DEGENERATION OF BOTH EYES: ICD-10-CM

## 2025-03-10 DIAGNOSIS — H05.241 EXOPHTHALMOS, CONSTANT, RIGHT: ICD-10-CM

## 2025-03-10 DIAGNOSIS — H04.021: ICD-10-CM

## 2025-03-10 DIAGNOSIS — H43.393 VITREOUS FLOATER, BILATERAL: ICD-10-CM

## 2025-03-10 DIAGNOSIS — H35.3131 EARLY STAGE NONEXUDATIVE AGE-RELATED MACULAR DEGENERATION OF BOTH EYES: Primary | ICD-10-CM

## 2025-03-10 DIAGNOSIS — H25.13 AGE-RELATED NUCLEAR CATARACT OF BOTH EYES: ICD-10-CM

## 2025-03-10 LAB
BASOPHILS # BLD AUTO: 0.06 10*3/MM3 (ref 0–0.2)
BASOPHILS NFR BLD AUTO: 0.6 % (ref 0–1.5)
DEPRECATED RDW RBC AUTO: 43.7 FL (ref 37–54)
EOSINOPHIL # BLD AUTO: 0.25 10*3/MM3 (ref 0–0.4)
EOSINOPHIL NFR BLD AUTO: 2.6 % (ref 0.3–6.2)
ERYTHROCYTE [DISTWIDTH] IN BLOOD BY AUTOMATED COUNT: 14.3 % (ref 12.3–15.4)
HCT VFR BLD AUTO: 36.4 % (ref 34–46.6)
HGB BLD-MCNC: 11.4 G/DL (ref 12–15.9)
IMM GRANULOCYTES # BLD AUTO: 0.02 10*3/MM3 (ref 0–0.05)
IMM GRANULOCYTES NFR BLD AUTO: 0.2 % (ref 0–0.5)
LYMPHOCYTES # BLD AUTO: 3.54 10*3/MM3 (ref 0.7–3.1)
LYMPHOCYTES NFR BLD AUTO: 37.3 % (ref 19.6–45.3)
MCH RBC QN AUTO: 26.5 PG (ref 26.6–33)
MCHC RBC AUTO-ENTMCNC: 31.3 G/DL (ref 31.5–35.7)
MCV RBC AUTO: 84.5 FL (ref 79–97)
MONOCYTES # BLD AUTO: 0.71 10*3/MM3 (ref 0.1–0.9)
MONOCYTES NFR BLD AUTO: 7.5 % (ref 5–12)
NEUTROPHILS NFR BLD AUTO: 4.9 10*3/MM3 (ref 1.7–7)
NEUTROPHILS NFR BLD AUTO: 51.8 % (ref 42.7–76)
NRBC BLD AUTO-RTO: 0 /100 WBC (ref 0–0.2)
PLATELET # BLD AUTO: 353 10*3/MM3 (ref 140–450)
PMV BLD AUTO: 9.7 FL (ref 6–12)
RBC # BLD AUTO: 4.31 10*6/MM3 (ref 3.77–5.28)
T3FREE SERPL-MCNC: 1.83 PG/ML (ref 2–4.4)
T4 FREE SERPL-MCNC: 1.47 NG/DL (ref 0.92–1.68)
TSH SERPL DL<=0.05 MIU/L-ACNC: 2.55 UIU/ML (ref 0.27–4.2)
WBC NRBC COR # BLD AUTO: 9.48 10*3/MM3 (ref 3.4–10.8)

## 2025-03-10 PROCEDURE — 83516 IMMUNOASSAY NONANTIBODY: CPT

## 2025-03-10 PROCEDURE — 86037 ANCA TITER EACH ANTIBODY: CPT

## 2025-03-10 PROCEDURE — 84443 ASSAY THYROID STIM HORMONE: CPT

## 2025-03-10 PROCEDURE — 82787 IGG 1 2 3 OR 4 EACH: CPT

## 2025-03-10 PROCEDURE — 84439 ASSAY OF FREE THYROXINE: CPT

## 2025-03-10 PROCEDURE — 84445 ASSAY OF TSI GLOBULIN: CPT

## 2025-03-10 PROCEDURE — 85025 COMPLETE CBC W/AUTO DIFF WBC: CPT

## 2025-03-10 PROCEDURE — 84481 FREE ASSAY (FT-3): CPT

## 2025-03-10 PROCEDURE — 36415 COLL VENOUS BLD VENIPUNCTURE: CPT

## 2025-03-10 RX ORDER — GABAPENTIN 100 MG/1
200 CAPSULE ORAL NIGHTLY
Qty: 60 CAPSULE | Refills: 0 | Status: SHIPPED | OUTPATIENT
Start: 2025-03-10

## 2025-03-11 LAB — IGG4 SER-MCNC: 2 MG/DL (ref 2–96)

## 2025-03-12 LAB
C-ANCA TITR SER IF: NORMAL TITER
MYELOPEROXIDASE AB SER IA-ACNC: <0.2 UNITS (ref 0–0.9)
P-ANCA ATYPICAL TITR SER IF: NORMAL TITER
P-ANCA TITR SER IF: NORMAL TITER
PROTEINASE3 AB SER IA-ACNC: <0.2 UNITS (ref 0–0.9)
TSI SER-ACNC: <0.1 IU/L (ref 0–0.55)

## 2025-04-03 NOTE — PROGRESS NOTES
Primary Care Provider  Jeaneth Barker APRN   Referring Provider  No ref. provider found      Patient Complaint  Bronchiectasis and Follow-up (4 month)      Subjective          Jeimy Willard presents to [facility]      History of Presenting Illness  Jeimy Willard is a 70 y.o. female patient of Dr. Dumont with bronchiectasis, history of Pseudomonas pneumonia, chronic cough, and history of gastric cancer, here for 4 month follow-up.    Patient states she is doing well since her last visit.  Patient denies using any antibiotics or steroids for her lungs recently, denies any fevers or chills, no ER visits or hospitalizations for her breathing since she was last seen.  She is currently on prednisone for eye issues.  Her primary respiratory complaint is chronic cough and occasional wheezing.  She denies any shortness of breath.  Patient does not use any maintenance inhalers but does have albuterol nebulizer treatments to use as needed, usually uses before her LAZARO nebs.  She uses tobramycin nebs every other month for history of Pseudomonas initially seen on respiratory culture November 2023 and again in April 2024.  Patient has been tolerating tobramycin well.  Patient takes Zyrtec, Astelin, and Flonase for allergies/rhinitis, gets allergy shots every 2 to 3 weeks.  She has never smoked.  Patient denies any hemoptysis, swollen lymph nodes, weight loss, or night sweats.  Overall, patient is doing well and has no additional concerns at this time.  Patient is able to perform ADLs without difficulty.  I have personally reviewed the review of systems, past family, social, medical and surgical histories; and agree with their findings.    Pulmonary Meds  Tobramycin nebs  Albuterol nebs  Zyrtec  Astelin  Flonase    Pulmonary equipment  None      Review of Systems    Review of Systems   Constitutional:  Negative for activity change, chills, fatigue, fever, unexpected weight gain  and unexpected weight loss.   HENT:  Negative for congestion, ear discharge, ear pain, mouth sores, postnasal drip, rhinorrhea, sinus pressure, sore throat, swollen glands and trouble swallowing.    Eyes:  Negative for visual disturbance.   Respiratory:  Positive for cough (productive of yellow sputum) and wheezing (occasional). Negative for apnea, chest tightness, shortness of breath and stridor.    Cardiovascular:  Negative for chest pain, palpitations and leg swelling.   Gastrointestinal:  Negative for abdominal distention, abdominal pain, nausea, vomiting, GERD and indigestion.   Musculoskeletal:  Negative for arthralgias, joint swelling and myalgias.   Skin:  Negative for color change.   Neurological:  Negative for dizziness, weakness, light-headedness and headache.      Sleep: Negative for Excessive daytime sleepiness  Negative for morning headaches  Negative for Snoring      Family History   Problem Relation Age of Onset    Breast cancer Mother     Stroke Mother     Heart disease Mother     Hypertension Mother     Cancer Mother         unspecified    Diabetes Mother         unspecified type    Kidney failure Mother     Arthritis Mother     Kidney disease Mother     Miscarriages / Stillbirths Mother     Heart failure Mother     Diabetes Father         unspecified type    Heart disease Brother     Hypertension Brother     Cancer Brother         unspecified    Diabetes Brother         unspecified type    Kidney disease Brother     Lung cancer Maternal Grandmother         malignant    Alzheimer's disease Maternal Grandfather     Esophageal cancer Paternal Grandmother         malignant    Miscarriages / Stillbirths Sister         Social History     Socioeconomic History    Marital status:    Tobacco Use    Smoking status: Never     Passive exposure: Never    Smokeless tobacco: Never   Vaping Use    Vaping status: Never Used   Substance and Sexual Activity    Alcohol use: Never    Drug use: Never    Sexual  activity: Not Currently     Partners: Male     Birth control/protection: Post-menopausal        Past Medical History:   Diagnosis Date    Allergic rhinitis     Anemia     Arthritis     Cancer     Cholelithiasis 2000    Colon polyp     GERD (gastroesophageal reflux disease)     History of transfusion     Hypertension     Insomnia     Lymphoma 1999    malignant    Osteoporosis     Seasonal allergies     Stomach cancer 2005    malignant    Stomach disorder     Ulcer (traumatic) of oral mucosa     Vitamin D deficiency         Immunization History   Administered Date(s) Administered    ABRYSVO (RSV, 60+ or pregnant women 32-36 wks) 08/05/2024    COVID-19 (PFIZER) Purple Cap Monovalent 03/10/2021, 03/31/2021, 10/22/2021    Covid-19 (Pfizer) Gray Cap Monovalent 04/16/2022    Flu Vaccine Quad PF >36MO 09/26/2020    FluMist 2-49yrs 09/26/2020    Fluzone (or Fluarix & Flulaval for VFC) >6mos 09/17/2021    Fluzone High-Dose 65+YRS 09/06/2024    Fluzone High-Dose 65+yrs 10/04/2023    Fluzone Quad >6mos (Multi-dose) 09/26/2020    Pneumococcal Conjugate 20-Valent (PCV20) 11/23/2022    Shingrix 12/19/2021, 03/11/2022       Allergies   Allergen Reactions    Heparin Other (See Comments)     Fever with chills          Current Outpatient Medications:     acetaminophen (TYLENOL) 325 MG tablet, Take 2 tablets by mouth Every 6 (Six) Hours As Needed., Disp: , Rfl:     azelastine (ASTELIN) 0.1 % nasal spray, , Disp: , Rfl:     brompheniramine-pseudoephedrine-DM 30-2-10 MG/5ML syrup, Take 5 mL by mouth 4 (Four) Times a Day As Needed for Allergies., Disp: 473 mL, Rfl: 0    Calcium Carbonate-Vitamin D 600-10 MG-MCG per tablet, Take 1 tablet by mouth 2 (Two) Times a Day., Disp: 180 tablet, Rfl: 1    celecoxib (CeleBREX) 200 MG capsule, Take 1 capsule by mouth Daily., Disp: 90 capsule, Rfl: 1    cetirizine (zyrTEC) 10 MG tablet, , Disp: , Rfl:     cyanocobalamin 1000 MCG/ML injection, , Disp: , Rfl:     Diclofenac Sodium (VOLTAREN) 1 % gel gel,  Apply 2 g topically to the appropriate area as directed 4 (Four) Times a Day As Needed (knee pain)., Disp: 100 g, Rfl: 1    diphenhydrAMINE (BENADRYL) 25 mg capsule, Take 1 capsule by mouth Every 6 (Six) Hours As Needed., Disp: , Rfl:     DULoxetine (CYMBALTA) 30 MG capsule, TAKE 1 CAPSULE BY MOUTH TWICE DAILY, Disp: 60 capsule, Rfl: 2    EPINEPHrine (EPIPEN) 0.3 MG/0.3ML solution auto-injector injection, , Disp: , Rfl:     famotidine (PEPCID) 20 MG tablet, , Disp: , Rfl:     fluticasone (FLONASE) 50 MCG/ACT nasal spray, , Disp: , Rfl:     folic acid (FOLVITE) 1 MG tablet, Take 1 tablet by mouth Daily., Disp: 90 tablet, Rfl: 3    gabapentin (NEURONTIN) 100 MG capsule, TAKE 2 CAPSULES BY MOUTH EVERY NIGHT, Disp: 60 capsule, Rfl: 0    hydrOXYzine (ATARAX) 25 MG tablet, Take 1 tablet by mouth Every 6 (Six) Hours As Needed for Itching., Disp: 30 tablet, Rfl: 1    Iron-Vitamin C (Vitron-C)  MG tablet, , Disp: , Rfl:     levalbuterol (Xopenex) 1.25 MG/3ML nebulizer solution, Take 1 ampule by nebulization Every 6 (Six) Hours As Needed for Wheezing., Disp: 90 each, Rfl: 2    levothyroxine (SYNTHROID, LEVOTHROID) 50 MCG tablet, TAKE 1 TABLET BY MOUTH EVERY MORNING, Disp: 90 tablet, Rfl: 1    lisinopril (PRINIVIL,ZESTRIL) 10 MG tablet, Take 0.5 tablets by mouth Daily., Disp: , Rfl:     multivitamins-minerals (PRESERVISION AREDS 2) capsule capsule, , Disp: , Rfl:     olopatadine (PATANOL) 0.1 % ophthalmic solution, 1 drop., Disp: , Rfl:     pantoprazole (PROTONIX) 40 MG EC tablet, Take 1 tablet by mouth Daily., Disp: 90 tablet, Rfl: 1    predniSONE (DELTASONE) 10 MG tablet, , Disp: , Rfl:     Premarin 0.625 MG/GM vaginal cream, Insert 2 g into the vagina As Needed (Vaginal irritation)., Disp: 30 g, Rfl: 2    Pyridoxine HCl (VITAMIN B-6 PO), , Disp: , Rfl:     tobramycin PF (David) 300 MG/5ML nebulizer solution, Take 5 mL by nebulization 2 (Two) Times a Day. Use daily for 1 month on, one month off., Disp: 50 mL, Rfl: 5     "VITAMIN D, CHOLECALCIFEROL, PO, Take 1 tablet by mouth Daily., Disp: , Rfl:     zolpidem CR (AMBIEN CR) 6.25 MG CR tablet, Take 1 tablet by mouth At Night As Needed for Sleep., Disp: 90 tablet, Rfl: 0    Current Facility-Administered Medications:     albuterol (PROVENTIL) nebulizer solution 0.042% 1.25 mg/3mL, 1.25 mg, Nebulization, Q6H PRN, Money, Marzena Solomon, APRN     Objective     Vital Signs:   /95 (BP Location: Left arm, Patient Position: Sitting, Cuff Size: Adult)   Pulse 85   Temp 97.1 °F (36.2 °C) (Tympanic)   Resp 18   Ht 160 cm (62.99\")   Wt 83.9 kg (185 lb)   SpO2 99% Comment: room air  BMI 32.78 kg/m²     Physical Exam  Constitutional:       General: She is not in acute distress.     Appearance: Normal appearance. She is normal weight. She is not ill-appearing.   HENT:      Right Ear: Tympanic membrane and ear canal normal.      Left Ear: Tympanic membrane and ear canal normal.      Nose: Nose normal.      Mouth/Throat:      Mouth: Mucous membranes are moist.      Pharynx: Oropharynx is clear.   Cardiovascular:      Rate and Rhythm: Normal rate and regular rhythm.      Pulses: Normal pulses.      Heart sounds: Normal heart sounds.   Pulmonary:      Effort: Pulmonary effort is normal. No respiratory distress.      Breath sounds: Normal breath sounds. No stridor. No wheezing, rhonchi or rales.   Musculoskeletal:         General: No swelling. Normal range of motion.      Cervical back: Normal range of motion and neck supple.      Right lower leg: No edema.      Left lower leg: No edema.   Skin:     General: Skin is warm and dry.   Neurological:      General: No focal deficit present.      Mental Status: She is alert and oriented to person, place, and time.      Motor: No weakness.   Psychiatric:         Mood and Affect: Mood normal.         Behavior: Behavior normal.        Result Review :   I have personally reviewed patient's labs and images.  I also reviewed my last progress note " 12/5/2024.    -PFTs 3/26/2024 showed normal spirometry without significant response to bronchodilator.  Normal lung volumes, normal DLCO.  -CT chest 2/5/2024 showed stable chronic bronchiectasis and atelectasis of right upper lobe, right middle lobe, and lingula.  There was also bronchial wall thickening in the lower lobe that appears to be worse compared to previous study.  Tree-in-bud opacities and airspace opacity lower lobe compatible with bronchiolitis and atelectasis/pneumonia.         Diagnoses and all orders for this visit:    1. Bronchiectasis without acute exacerbation (Primary)    2. History of Pseudomonas pneumonia    3. Chronic cough    4. Seasonal allergies    5. History of gastric cancer      Impression and Plan    -Continue using tobramycin nebulizer treatments twice daily every other month for history of Pseudomonas infection November 2023 and again in April 2024, with bronchiectasis.  -Continue using albuterol nebulizer treatments as needed  -Continue using flutter valve to assist with airway clearance  -Continue taking Zyrtec, nasal sprays for allergies/rhinitis, continue with allergy shots every 2 to 3 weeks  -Follow-up with Dr. Dumont or myself in 6 months, may return sooner if needed    Smoking status: Reviewed  Vaccination status: Patient reports she is up-to-date with her flu, pneumonia, and Covid vaccines.  She has also had RSV vaccine.  Patient is advised to continue to follow CDC recommendations such as social distancing wearing a mask and washing hands for at least 20 seconds.  Medications personally reviewed    Follow Up   No follow-ups on file.  Patient was given instructions and counseling regarding her condition or for health maintenance advice. Please see specific information pulled into the AVS if appropriate.

## 2025-04-07 ENCOUNTER — OFFICE VISIT (OUTPATIENT)
Dept: PULMONOLOGY | Facility: CLINIC | Age: 70
End: 2025-04-07
Payer: MEDICARE

## 2025-04-07 VITALS
DIASTOLIC BLOOD PRESSURE: 95 MMHG | BODY MASS INDEX: 32.78 KG/M2 | HEIGHT: 63 IN | SYSTOLIC BLOOD PRESSURE: 158 MMHG | HEART RATE: 85 BPM | RESPIRATION RATE: 18 BRPM | WEIGHT: 185 LBS | OXYGEN SATURATION: 99 % | TEMPERATURE: 97.1 F

## 2025-04-07 DIAGNOSIS — R05.3 CHRONIC COUGH: ICD-10-CM

## 2025-04-07 DIAGNOSIS — Z87.01 HISTORY OF PSEUDOMONAS PNEUMONIA: ICD-10-CM

## 2025-04-07 DIAGNOSIS — Z85.028 HISTORY OF GASTRIC CANCER: ICD-10-CM

## 2025-04-07 DIAGNOSIS — J47.9 BRONCHIECTASIS WITHOUT ACUTE EXACERBATION: Primary | ICD-10-CM

## 2025-04-07 DIAGNOSIS — J30.2 SEASONAL ALLERGIES: ICD-10-CM

## 2025-04-07 PROCEDURE — 3080F DIAST BP >= 90 MM HG: CPT

## 2025-04-07 PROCEDURE — 3077F SYST BP >= 140 MM HG: CPT

## 2025-04-07 PROCEDURE — 1160F RVW MEDS BY RX/DR IN RCRD: CPT

## 2025-04-07 PROCEDURE — 99214 OFFICE O/P EST MOD 30 MIN: CPT

## 2025-04-07 PROCEDURE — 1159F MED LIST DOCD IN RCRD: CPT

## 2025-04-07 RX ORDER — PREDNISONE 10 MG/1
TABLET ORAL
COMMUNITY
Start: 2025-03-13

## 2025-04-07 RX ORDER — CALCIUM CARBONATE/VITAMIN D3 600 MG-10
TABLET ORAL
COMMUNITY
Start: 2025-03-03 | End: 2025-04-07

## 2025-04-24 NOTE — PROGRESS NOTES
Tele-Health Visit Note  Subjective     Jeimy Willard is a 70 y.o. female.     Chief Complaint   Patient presents with    Primary insomnia    Pruritus    Hypothyroidism       History of Present Illness  The patient presents via virtual visit for evaluation of sleep issues, itching, and thyroid management.    Pruritus is reported, predominantly on the left leg, specifically behind the knee and at the ankle. These areas exhibit soft scarring from previous shingles lesions. The itching intensifies upon contact, particularly when clothing touches the affected areas. Occasionally, itching is experienced on the posterior aspect of the thigh. No associated numbness or tingling sensations are reported. A nightly regimen of gabapentin is currently being followed, and Cymbalta is taken twice daily, although its efficacy is uncertain. Typically, sleep is uninterrupted, but pain occasionally causes awakening. A desire to discontinue these medications is expressed. A sufficient supply of topical cream is available.    Adherence to the prescribed thyroid medication regimen is reported, with it being taken each morning on an empty stomach. However, a perceived decrease in energy levels is noted.    Ambien is being taken for sleep issues, with no hangover side effects reported. Approximately 8 to 9 hours of sleep are being achieved, and overall well-being is noted.    Swelling of the eye was experienced for the last 6 weeks, initially thought to be allergies but diagnosed as lacrimal gland inflammation. Prednisone was taken for a month, and cessation occurred last week.     Pt oncology doctor indicated that she is fine and will follow up in a few months. Lab tests are reported as normal.      PHQ-9 Total Score:      ECHO-7 Total Score:        Review of Systems   Constitutional:  Negative for chills, fatigue and fever.   Respiratory:  Negative for cough and shortness of breath.    Cardiovascular:  Negative for chest pain and  "palpitations.   Gastrointestinal:  Negative for abdominal pain, blood in stool, constipation, diarrhea, nausea and vomiting.   Musculoskeletal:  Negative for back pain and neck pain.   Skin:  Negative for rash.   Neurological:  Positive for numbness (\"pruritis\"). Negative for dizziness and headaches.   Psychiatric/Behavioral:  Positive for sleep disturbance. Negative for dysphoric mood, self-injury and suicidal ideas. The patient is not nervous/anxious.        Objective     Gen: well-nourished, no acute distress  HENT: atraumatic, normocephalic  Eyes: extraocular movements intact, no scleral icterus  Lungs: breathing comfortably, no cough  Neuro: grossly oriented to person, place, and time. no facial droop   Psych: normal mood and affect    Results  Labs   - Thyroid level: 2.5       Assessment & Plan     Assessment & Plan  1. Pruritus.  - The itching is primarily located on the left leg behind the knee and at the ankle, with occasional itching on the back of the thigh.  - Continued use of Cymbalta 60 mg twice daily and topical gabapentin has been advised. Hydroxyzine may be considered as an alternative treatment for itching, anxiety, and insomnia.  - If symptoms persist, the dosage of Cymbalta can be increased to 60 mg twice daily.    2. Thyroid management.  - Thyroid level recorded at 2.5; patient reports feeling slightly slower in energy.  - Increase thyroid medication dosage to 75 mcg for 6 weeks, followed by reassessment of thyroid levels.  - Prescription for 60 tablets of the increased dosage provided. Patient instructed to take 1.5 tablets of the current 50 mcg dosage until the new prescription is available.  - Follow-up thyroid test scheduled in 6 weeks.    3. Sleep issues.  - Currently taking Ambien with no reported hangover side effects.  - Patient is getting approximately 8 to 9 hours of sleep and reports doing well.  - Prescription drug monitoring program utilized for Ambien refill.    Follow-up  - " Scheduled for a follow-up visit in 3 months, including a urine drug screen.    Diagnoses and all orders for this visit:    1. Primary insomnia (Primary)  -     zolpidem CR (AMBIEN CR) 6.25 MG CR tablet; Take 1 tablet by mouth At Night As Needed for Sleep.  Dispense: 90 tablet; Refill: 1    2. Neuropathy  Comments:  Continue cymbalta and wean gabapentin as tolerated. continue use of topical med as directed.    3. Hypothyroidism, unspecified type  -     levothyroxine (SYNTHROID, LEVOTHROID) 75 MCG tablet; Take 1 tablet by mouth Every Morning.  Dispense: 60 tablet; Refill: 0  -     TSH+Free T4; Future      Obtained a written consent for LEESA query. Discussed the risks and benefits of the use of controlled substances with the patient, including the risk of tolerance and drug dependence.  The patient has been counseled on the need to have an exit strategy, including potentially discontinuing the use of controlled substances.  LEESA has or will be reviewed as soon as it becomes available.    Return in about 3 months (around 7/29/2025) for Next scheduled follow up.    GUNJAN Malloy        Patient or patient representative verbalized consent for the use of Ambient Listening during the visit with  GUNJAN Malloy for chart documentation. 4/29/2025  11:29 EDT

## 2025-04-29 ENCOUNTER — TELEMEDICINE (OUTPATIENT)
Dept: FAMILY MEDICINE CLINIC | Facility: CLINIC | Age: 70
End: 2025-04-29
Payer: MEDICARE

## 2025-04-29 DIAGNOSIS — F51.01 PRIMARY INSOMNIA: Primary | Chronic | ICD-10-CM

## 2025-04-29 DIAGNOSIS — E03.9 HYPOTHYROIDISM, UNSPECIFIED TYPE: Chronic | ICD-10-CM

## 2025-04-29 DIAGNOSIS — G62.9 NEUROPATHY: Chronic | ICD-10-CM

## 2025-04-29 PROCEDURE — 1125F AMNT PAIN NOTED PAIN PRSNT: CPT | Performed by: NURSE PRACTITIONER

## 2025-04-29 PROCEDURE — 1160F RVW MEDS BY RX/DR IN RCRD: CPT | Performed by: NURSE PRACTITIONER

## 2025-04-29 PROCEDURE — 99213 OFFICE O/P EST LOW 20 MIN: CPT | Performed by: NURSE PRACTITIONER

## 2025-04-29 PROCEDURE — 1159F MED LIST DOCD IN RCRD: CPT | Performed by: NURSE PRACTITIONER

## 2025-04-29 RX ORDER — LEVOTHYROXINE SODIUM 75 UG/1
75 TABLET ORAL
Qty: 60 TABLET | Refills: 0 | Status: SHIPPED | OUTPATIENT
Start: 2025-04-29

## 2025-04-29 RX ORDER — ZOLPIDEM TARTRATE 6.25 MG/1
6.25 TABLET, FILM COATED, EXTENDED RELEASE ORAL NIGHTLY PRN
Qty: 90 TABLET | Refills: 1 | Status: SHIPPED | OUTPATIENT
Start: 2025-04-29

## 2025-04-29 NOTE — PATIENT INSTRUCTIONS
Insomnia  Insomnia is a sleep disorder that makes it difficult to fall asleep or stay asleep. Insomnia can cause fatigue, low energy, difficulty concentrating, mood swings, and poor performance at work or school.  There are three different ways to classify insomnia:  Difficulty falling asleep.  Difficulty staying asleep.  Waking up too early in the morning.  Any type of insomnia can be long-term (chronic) or short-term (acute). Both are common. Short-term insomnia usually lasts for 3 months or less. Chronic insomnia occurs at least three times a week for longer than 3 months.  What are the causes?  Insomnia may be caused by another condition, situation, or substance, such as:  Having certain mental health conditions, such as anxiety and depression.  Using caffeine, alcohol, tobacco, or drugs.  Having gastrointestinal conditions, such as gastroesophageal reflux disease (GERD).  Having certain medical conditions. These include:  Asthma.  Alzheimer's disease.  Stroke.  Chronic pain.  An overactive thyroid gland (hyperthyroidism).  Other sleep disorders, such as restless legs syndrome and sleep apnea.  Menopause.  Sometimes, the cause of insomnia may not be known.  What increases the risk?  Risk factors for insomnia include:  Gender. Females are affected more often than males.  Age. Insomnia is more common as people get older.  Stress and certain medical and mental health conditions.  Lack of exercise.  Having an irregular work schedule. This may include working night shifts and traveling between different time zones.  What are the signs or symptoms?  If you have insomnia, the main symptom is having trouble falling asleep or having trouble staying asleep. This may lead to other symptoms, such as:  Feeling tired or having low energy.  Feeling nervous about going to sleep.  Not feeling rested in the morning.  Having trouble concentrating.  Feeling irritable, anxious, or depressed.  How is this diagnosed?  This condition  may be diagnosed based on:  Your symptoms and medical history. Your health care provider may ask about:  Your sleep habits.  Any medical conditions you have.  Your mental health.  A physical exam.  How is this treated?  Treatment for insomnia depends on the cause. Treatment may focus on treating an underlying condition that is causing the insomnia. Treatment may also include:  Medicines to help you sleep.  Counseling or therapy.  Lifestyle adjustments to help you sleep better.  Follow these instructions at home:  Eating and drinking    Limit or avoid alcohol, caffeinated beverages, and products that contain nicotine and tobacco, especially close to bedtime. These can disrupt your sleep.  Do not eat a large meal or eat spicy foods right before bedtime. This can lead to digestive discomfort that can make it hard for you to sleep.  Sleep habits    Keep a sleep diary to help you and your health care provider figure out what could be causing your insomnia. Write down:  When you sleep.  When you wake up during the night.  How well you sleep and how rested you feel the next day.  Any side effects of medicines you are taking.  What you eat and drink.  Make your bedroom a dark, comfortable place where it is easy to fall asleep.  Put up shades or blackout curtains to block light from outside.  Use a white noise machine to block noise.  Keep the temperature cool.  Limit screen use before bedtime. This includes:  Not watching TV.  Not using your smartphone, tablet, or computer.  Stick to a routine that includes going to bed and waking up at the same times every day and night. This can help you fall asleep faster. Consider making a quiet activity, such as reading, part of your nighttime routine.  Try to avoid taking naps during the day so that you sleep better at night.  Get out of bed if you are still awake after 15 minutes of trying to sleep. Keep the lights down, but try reading or doing a quiet activity. When you feel  sleepy, go back to bed.  General instructions  Take over-the-counter and prescription medicines only as told by your health care provider.  Exercise regularly as told by your health care provider. However, avoid exercising in the hours right before bedtime.  Use relaxation techniques to manage stress. Ask your health care provider to suggest some techniques that may work well for you. These may include:  Breathing exercises.  Routines to release muscle tension.  Visualizing peaceful scenes.  Make sure that you drive carefully. Do not drive if you feel very sleepy.  Keep all follow-up visits. This is important.  Contact a health care provider if:  You are tired throughout the day.  You have trouble in your daily routine due to sleepiness.  You continue to have sleep problems, or your sleep problems get worse.  Get help right away if:  You have thoughts about hurting yourself or someone else.  Get help right away if you feel like you may hurt yourself or others, or have thoughts about taking your own life. Go to your nearest emergency room or:  Call 911.  Call the National Suicide Prevention Lifeline at 1-329.491.2213 or 213. This is open 24 hours a day.  Text the Crisis Text Line at 990541.  Summary  Insomnia is a sleep disorder that makes it difficult to fall asleep or stay asleep.  Insomnia can be long-term (chronic) or short-term (acute).  Treatment for insomnia depends on the cause. Treatment may focus on treating an underlying condition that is causing the insomnia.  Keep a sleep diary to help you and your health care provider figure out what could be causing your insomnia.  This information is not intended to replace advice given to you by your health care provider. Make sure you discuss any questions you have with your health care provider.  Document Revised: 11/28/2022 Document Reviewed: 11/28/2022  [employer]vier Patient Education © 2024 [employer]

## 2025-04-30 DIAGNOSIS — R05.2 SUBACUTE COUGH: ICD-10-CM

## 2025-04-30 DIAGNOSIS — J47.0 BRONCHIECTASIS WITH ACUTE LOWER RESPIRATORY INFECTION: ICD-10-CM

## 2025-04-30 RX ORDER — BROMPHENIRAMINE MALEATE, PSEUDOEPHEDRINE HYDROCHLORIDE, AND DEXTROMETHORPHAN HYDROBROMIDE 2; 30; 10 MG/5ML; MG/5ML; MG/5ML
5 SYRUP ORAL 4 TIMES DAILY PRN
Qty: 473 ML | Refills: 0 | Status: SHIPPED | OUTPATIENT
Start: 2025-04-30

## 2025-05-12 DIAGNOSIS — B02.29 POST HERPETIC NEURALGIA: ICD-10-CM

## 2025-05-12 DIAGNOSIS — M54.42 ACUTE LEFT-SIDED LOW BACK PAIN WITH LEFT-SIDED SCIATICA: ICD-10-CM

## 2025-05-12 RX ORDER — DULOXETIN HYDROCHLORIDE 30 MG/1
30 CAPSULE, DELAYED RELEASE ORAL 2 TIMES DAILY
Qty: 60 CAPSULE | Refills: 2 | Status: SHIPPED | OUTPATIENT
Start: 2025-05-12

## 2025-05-28 DIAGNOSIS — K21.9 GASTROESOPHAGEAL REFLUX DISEASE WITHOUT ESOPHAGITIS: ICD-10-CM

## 2025-05-28 DIAGNOSIS — N95.9 MENOPAUSAL DISORDER: ICD-10-CM

## 2025-05-28 DIAGNOSIS — J47.0 BRONCHIECTASIS WITH ACUTE LOWER RESPIRATORY INFECTION: ICD-10-CM

## 2025-05-28 DIAGNOSIS — C80.1 CANCER: ICD-10-CM

## 2025-05-28 DIAGNOSIS — G89.29 CHRONIC PAIN OF LEFT KNEE: ICD-10-CM

## 2025-05-28 DIAGNOSIS — M05.79 RHEUMATOID ARTHRITIS INVOLVING MULTIPLE SITES WITH POSITIVE RHEUMATOID FACTOR: ICD-10-CM

## 2025-05-28 DIAGNOSIS — R05.2 SUBACUTE COUGH: ICD-10-CM

## 2025-05-28 DIAGNOSIS — M25.562 CHRONIC PAIN OF LEFT KNEE: ICD-10-CM

## 2025-05-28 RX ORDER — BROMPHENIRAMINE MALEATE, PSEUDOEPHEDRINE HYDROCHLORIDE, AND DEXTROMETHORPHAN HYDROBROMIDE 2; 30; 10 MG/5ML; MG/5ML; MG/5ML
5 SYRUP ORAL 4 TIMES DAILY PRN
Qty: 473 ML | Refills: 0 | Status: SHIPPED | OUTPATIENT
Start: 2025-05-28

## 2025-05-28 RX ORDER — PANTOPRAZOLE SODIUM 40 MG/1
40 TABLET, DELAYED RELEASE ORAL DAILY
Qty: 90 TABLET | Refills: 1 | Status: SHIPPED | OUTPATIENT
Start: 2025-05-28

## 2025-05-28 RX ORDER — CONJUGATED ESTROGENS 0.62 MG/G
2 CREAM VAGINAL AS NEEDED
Qty: 30 G | Refills: 2 | Status: SHIPPED | OUTPATIENT
Start: 2025-05-28

## 2025-05-28 RX ORDER — ANTIOX #8/OM3/DHA/EPA/LUT/ZEAX 250-2.5 MG
1 CAPSULE ORAL 2 TIMES DAILY
Qty: 180 CAPSULE | Refills: 1 | Status: SHIPPED | OUTPATIENT
Start: 2025-05-28

## 2025-05-28 RX ORDER — FOLIC ACID 1 MG/1
1 TABLET ORAL DAILY
Qty: 90 TABLET | Refills: 1 | Status: SHIPPED | OUTPATIENT
Start: 2025-05-28

## 2025-05-28 RX ORDER — ACETAMINOPHEN 325 MG/1
650 TABLET ORAL EVERY 6 HOURS PRN
Qty: 360 TABLET | Refills: 0 | Status: SHIPPED | OUTPATIENT
Start: 2025-05-28

## 2025-05-28 RX ORDER — FAMOTIDINE 20 MG/1
20 TABLET, FILM COATED ORAL DAILY
Qty: 90 TABLET | Refills: 1 | Status: SHIPPED | OUTPATIENT
Start: 2025-05-28

## 2025-05-28 RX ORDER — CELECOXIB 200 MG/1
200 CAPSULE ORAL DAILY
Qty: 90 CAPSULE | Refills: 1 | Status: SHIPPED | OUTPATIENT
Start: 2025-05-28

## 2025-05-30 ENCOUNTER — LAB (OUTPATIENT)
Dept: LAB | Facility: HOSPITAL | Age: 70
End: 2025-05-30
Payer: MEDICARE

## 2025-05-30 DIAGNOSIS — E03.9 HYPOTHYROIDISM, UNSPECIFIED TYPE: Chronic | ICD-10-CM

## 2025-05-30 LAB
T4 FREE SERPL-MCNC: 1.43 NG/DL (ref 0.92–1.68)
TSH SERPL DL<=0.05 MIU/L-ACNC: 0.88 UIU/ML (ref 0.27–4.2)

## 2025-05-30 PROCEDURE — 36415 COLL VENOUS BLD VENIPUNCTURE: CPT

## 2025-05-30 PROCEDURE — 84443 ASSAY THYROID STIM HORMONE: CPT

## 2025-05-30 PROCEDURE — 84439 ASSAY OF FREE THYROXINE: CPT

## 2025-06-18 ENCOUNTER — TRANSCRIBE ORDERS (OUTPATIENT)
Dept: ADMINISTRATIVE | Facility: HOSPITAL | Age: 70
End: 2025-06-18
Payer: MEDICARE

## 2025-06-18 DIAGNOSIS — D47.2 IGG MONOCLONAL GAMMOPATHY OF UNCERTAIN SIGNIFICANCE: Primary | ICD-10-CM

## 2025-06-26 ENCOUNTER — TRANSCRIBE ORDERS (OUTPATIENT)
Dept: ADMINISTRATIVE | Facility: HOSPITAL | Age: 70
End: 2025-06-26
Payer: MEDICARE

## 2025-06-26 DIAGNOSIS — E03.9 HYPOTHYROIDISM, UNSPECIFIED TYPE: Chronic | ICD-10-CM

## 2025-06-26 DIAGNOSIS — C16.9 MALIGNANT NEOPLASM OF STOMACH, UNSPECIFIED LOCATION: ICD-10-CM

## 2025-06-26 DIAGNOSIS — N18.9 CHRONIC KIDNEY DISEASE, UNSPECIFIED CKD STAGE: ICD-10-CM

## 2025-06-26 DIAGNOSIS — D47.2 GAMMOPATHY, MONOCLONAL: Primary | ICD-10-CM

## 2025-06-26 RX ORDER — LEVOTHYROXINE SODIUM 75 UG/1
75 TABLET ORAL
Qty: 90 TABLET | Refills: 1 | Status: SHIPPED | OUTPATIENT
Start: 2025-06-26

## 2025-06-30 DIAGNOSIS — R05.2 SUBACUTE COUGH: ICD-10-CM

## 2025-06-30 DIAGNOSIS — J47.0 BRONCHIECTASIS WITH ACUTE LOWER RESPIRATORY INFECTION: ICD-10-CM

## 2025-06-30 RX ORDER — BROMPHENIRAMINE MALEATE, PSEUDOEPHEDRINE HYDROCHLORIDE, AND DEXTROMETHORPHAN HYDROBROMIDE 2; 30; 10 MG/5ML; MG/5ML; MG/5ML
5 SYRUP ORAL 4 TIMES DAILY PRN
Qty: 473 ML | Refills: 0 | Status: SHIPPED | OUTPATIENT
Start: 2025-06-30

## 2025-07-03 ENCOUNTER — HOSPITAL ENCOUNTER (OUTPATIENT)
Dept: ULTRASOUND IMAGING | Facility: HOSPITAL | Age: 70
Discharge: HOME | End: 2025-07-03
Payer: MEDICARE

## 2025-07-03 ENCOUNTER — TRANSCRIBE ORDERS (OUTPATIENT)
Dept: ADMINISTRATIVE | Facility: HOSPITAL | Age: 70
End: 2025-07-03
Payer: MEDICARE

## 2025-07-03 ENCOUNTER — LAB (OUTPATIENT)
Facility: HOSPITAL | Age: 70
End: 2025-07-03
Payer: MEDICARE

## 2025-07-03 ENCOUNTER — HOSPITAL ENCOUNTER (OUTPATIENT)
Dept: CT IMAGING | Facility: HOSPITAL | Age: 70
Discharge: HOME | End: 2025-07-03
Payer: MEDICARE

## 2025-07-03 DIAGNOSIS — D47.2 GAMMOPATHY, MONOCLONAL: ICD-10-CM

## 2025-07-03 DIAGNOSIS — D64.9 ANEMIA, UNSPECIFIED TYPE: Primary | ICD-10-CM

## 2025-07-03 DIAGNOSIS — D64.9 ANEMIA, UNSPECIFIED TYPE: ICD-10-CM

## 2025-07-03 DIAGNOSIS — D47.2 IGG MONOCLONAL GAMMOPATHY OF UNCERTAIN SIGNIFICANCE: ICD-10-CM

## 2025-07-03 DIAGNOSIS — E53.8 B12 DEFICIENCY: ICD-10-CM

## 2025-07-03 DIAGNOSIS — C16.9 MALIGNANT NEOPLASM OF STOMACH, UNSPECIFIED LOCATION: ICD-10-CM

## 2025-07-03 DIAGNOSIS — I10 ESSENTIAL (PRIMARY) HYPERTENSION: ICD-10-CM

## 2025-07-03 DIAGNOSIS — N18.9 CHRONIC KIDNEY DISEASE, UNSPECIFIED CKD STAGE: ICD-10-CM

## 2025-07-03 LAB
ALBUMIN SERPL-MCNC: 3.5 G/DL (ref 3.5–5.2)
ALBUMIN UR-MCNC: 1.9 MG/DL
ALBUMIN/GLOB SERPL: 1 G/DL
ALP SERPL-CCNC: 109 U/L (ref 39–117)
ALT SERPL W P-5'-P-CCNC: 18 U/L (ref 1–33)
ANION GAP SERPL CALCULATED.3IONS-SCNC: 11.8 MMOL/L (ref 5–15)
AST SERPL-CCNC: 27 U/L (ref 1–32)
BACTERIA UR QL AUTO: ABNORMAL /HPF
BASOPHILS # BLD AUTO: 0.1 10*3/MM3 (ref 0–0.2)
BASOPHILS NFR BLD AUTO: 0.9 % (ref 0–1.5)
BILIRUB SERPL-MCNC: 0.3 MG/DL (ref 0–1.2)
BILIRUB UR QL STRIP: NEGATIVE
BUN SERPL-MCNC: 19 MG/DL (ref 8–23)
BUN/CREAT SERPL: 10.5 (ref 7–25)
CALCIUM SPEC-SCNC: 9.3 MG/DL (ref 8.6–10.5)
CHLORIDE SERPL-SCNC: 102 MMOL/L (ref 98–107)
CLARITY UR: ABNORMAL
CO2 SERPL-SCNC: 25.2 MMOL/L (ref 22–29)
COD CRY URNS QL: PRESENT /HPF
COLOR UR: YELLOW
CORTIS AM PEAK SERPL-MCNC: 12.98 MCG/DL (ref 6.02–18.4)
CREAT SERPL-MCNC: 1.81 MG/DL (ref 0.57–1)
CREAT UR-MCNC: 185.2 MG/DL
DEPRECATED RDW RBC AUTO: 51.7 FL (ref 37–54)
EGFRCR SERPLBLD CKD-EPI 2021: 30.2 ML/MIN/1.73
EOSINOPHIL # BLD AUTO: 0.37 10*3/MM3 (ref 0–0.4)
EOSINOPHIL NFR BLD AUTO: 3.4 % (ref 0.3–6.2)
ERYTHROCYTE [DISTWIDTH] IN BLOOD BY AUTOMATED COUNT: 17.1 % (ref 12.3–15.4)
FOLATE SERPL-MCNC: >20 NG/ML (ref 4.78–24.2)
GLOBULIN UR ELPH-MCNC: 3.4 GM/DL
GLUCOSE SERPL-MCNC: 74 MG/DL (ref 65–99)
GLUCOSE UR STRIP-MCNC: NEGATIVE MG/DL
HCT VFR BLD AUTO: 31.3 % (ref 34–46.6)
HGB BLD-MCNC: 9.2 G/DL (ref 12–15.9)
HGB UR QL STRIP.AUTO: NEGATIVE
HYALINE CASTS UR QL AUTO: ABNORMAL /LPF
IMM GRANULOCYTES # BLD AUTO: 0.02 10*3/MM3 (ref 0–0.05)
IMM GRANULOCYTES NFR BLD AUTO: 0.2 % (ref 0–0.5)
IRON 24H UR-MRATE: 30 MCG/DL (ref 37–145)
IRON SATN MFR SERPL: 7 % (ref 20–50)
KETONES UR QL STRIP: ABNORMAL
LEUKOCYTE ESTERASE UR QL STRIP.AUTO: ABNORMAL
LYMPHOCYTES # BLD AUTO: 4.36 10*3/MM3 (ref 0.7–3.1)
LYMPHOCYTES NFR BLD AUTO: 40.2 % (ref 19.6–45.3)
MAGNESIUM SERPL-MCNC: 2 MG/DL (ref 1.6–2.4)
MCH RBC QN AUTO: 24.5 PG (ref 26.6–33)
MCHC RBC AUTO-ENTMCNC: 29.4 G/DL (ref 31.5–35.7)
MCV RBC AUTO: 83.2 FL (ref 79–97)
MICROALBUMIN/CREAT UR: 10.3 MG/G (ref 0–29)
MONOCYTES # BLD AUTO: 1.02 10*3/MM3 (ref 0.1–0.9)
MONOCYTES NFR BLD AUTO: 9.4 % (ref 5–12)
NEUTROPHILS NFR BLD AUTO: 4.97 10*3/MM3 (ref 1.7–7)
NEUTROPHILS NFR BLD AUTO: 45.9 % (ref 42.7–76)
NITRITE UR QL STRIP: NEGATIVE
NRBC BLD AUTO-RTO: 0 /100 WBC (ref 0–0.2)
PH UR STRIP.AUTO: 5.5 [PH] (ref 5–8)
PLATELET # BLD AUTO: 548 10*3/MM3 (ref 140–450)
PMV BLD AUTO: 9.7 FL (ref 6–12)
POTASSIUM SERPL-SCNC: 4.4 MMOL/L (ref 3.5–5.2)
PROT SERPL-MCNC: 6.9 G/DL (ref 6–8.5)
PROT UR QL STRIP: ABNORMAL
RBC # BLD AUTO: 3.76 10*6/MM3 (ref 3.77–5.28)
RBC # UR STRIP: ABNORMAL /HPF
REF LAB TEST METHOD: ABNORMAL
SODIUM SERPL-SCNC: 139 MMOL/L (ref 136–145)
SP GR UR STRIP: 1.02 (ref 1–1.03)
SQUAMOUS #/AREA URNS HPF: ABNORMAL /HPF
TIBC SERPL-MCNC: 420 MCG/DL (ref 298–536)
TRANSFERRIN SERPL-MCNC: 282 MG/DL (ref 200–360)
UROBILINOGEN UR QL STRIP: ABNORMAL
VIT B12 BLD-MCNC: 1754 PG/ML (ref 211–946)
WBC # UR STRIP: ABNORMAL /HPF
WBC NRBC COR # BLD AUTO: 10.84 10*3/MM3 (ref 3.4–10.8)

## 2025-07-03 PROCEDURE — 80053 COMPREHEN METABOLIC PANEL: CPT

## 2025-07-03 PROCEDURE — 82533 TOTAL CORTISOL: CPT

## 2025-07-03 PROCEDURE — 81001 URINALYSIS AUTO W/SCOPE: CPT

## 2025-07-03 PROCEDURE — 82043 UR ALBUMIN QUANTITATIVE: CPT

## 2025-07-03 PROCEDURE — 84466 ASSAY OF TRANSFERRIN: CPT

## 2025-07-03 PROCEDURE — 83540 ASSAY OF IRON: CPT

## 2025-07-03 PROCEDURE — 82607 VITAMIN B-12: CPT

## 2025-07-03 PROCEDURE — 70490 CT SOFT TISSUE NECK W/O DYE: CPT

## 2025-07-03 PROCEDURE — 82570 ASSAY OF URINE CREATININE: CPT

## 2025-07-03 PROCEDURE — 83735 ASSAY OF MAGNESIUM: CPT

## 2025-07-03 PROCEDURE — 74176 CT ABD & PELVIS W/O CONTRAST: CPT

## 2025-07-03 PROCEDURE — 82746 ASSAY OF FOLIC ACID SERUM: CPT

## 2025-07-03 PROCEDURE — 76775 US EXAM ABDO BACK WALL LIM: CPT

## 2025-07-03 PROCEDURE — 85025 COMPLETE CBC W/AUTO DIFF WBC: CPT

## 2025-07-03 PROCEDURE — 71250 CT THORAX DX C-: CPT

## 2025-07-03 PROCEDURE — 36415 COLL VENOUS BLD VENIPUNCTURE: CPT

## 2025-07-28 ENCOUNTER — OFFICE VISIT (OUTPATIENT)
Dept: FAMILY MEDICINE CLINIC | Facility: CLINIC | Age: 70
End: 2025-07-28
Payer: MEDICARE

## 2025-07-28 VITALS
OXYGEN SATURATION: 95 % | HEIGHT: 62 IN | HEART RATE: 82 BPM | SYSTOLIC BLOOD PRESSURE: 124 MMHG | DIASTOLIC BLOOD PRESSURE: 82 MMHG | BODY MASS INDEX: 31.34 KG/M2 | TEMPERATURE: 97.4 F | WEIGHT: 170.3 LBS

## 2025-07-28 DIAGNOSIS — J47.0 BRONCHIECTASIS WITH ACUTE LOWER RESPIRATORY INFECTION: ICD-10-CM

## 2025-07-28 DIAGNOSIS — Z13.220 SCREENING FOR LIPID DISORDERS: ICD-10-CM

## 2025-07-28 DIAGNOSIS — Z51.81 ENCOUNTER FOR MEDICATION MONITORING: ICD-10-CM

## 2025-07-28 DIAGNOSIS — Z13.29 SCREENING FOR THYROID DISORDER: ICD-10-CM

## 2025-07-28 DIAGNOSIS — R05.2 SUBACUTE COUGH: ICD-10-CM

## 2025-07-28 DIAGNOSIS — B02.29 POST HERPETIC NEURALGIA: ICD-10-CM

## 2025-07-28 DIAGNOSIS — F51.01 PRIMARY INSOMNIA: Chronic | ICD-10-CM

## 2025-07-28 DIAGNOSIS — M54.42 ACUTE LEFT-SIDED LOW BACK PAIN WITH LEFT-SIDED SCIATICA: ICD-10-CM

## 2025-07-28 DIAGNOSIS — K21.9 GASTROESOPHAGEAL REFLUX DISEASE WITHOUT ESOPHAGITIS: Primary | ICD-10-CM

## 2025-07-28 PROCEDURE — 1125F AMNT PAIN NOTED PAIN PRSNT: CPT | Performed by: NURSE PRACTITIONER

## 2025-07-28 PROCEDURE — 1160F RVW MEDS BY RX/DR IN RCRD: CPT | Performed by: NURSE PRACTITIONER

## 2025-07-28 PROCEDURE — 1159F MED LIST DOCD IN RCRD: CPT | Performed by: NURSE PRACTITIONER

## 2025-07-28 PROCEDURE — 99214 OFFICE O/P EST MOD 30 MIN: CPT | Performed by: NURSE PRACTITIONER

## 2025-07-28 PROCEDURE — 3079F DIAST BP 80-89 MM HG: CPT | Performed by: NURSE PRACTITIONER

## 2025-07-28 PROCEDURE — G2211 COMPLEX E/M VISIT ADD ON: HCPCS | Performed by: NURSE PRACTITIONER

## 2025-07-28 PROCEDURE — 3074F SYST BP LT 130 MM HG: CPT | Performed by: NURSE PRACTITIONER

## 2025-07-28 RX ORDER — BROMPHENIRAMINE MALEATE, PSEUDOEPHEDRINE HYDROCHLORIDE, AND DEXTROMETHORPHAN HYDROBROMIDE 2; 30; 10 MG/5ML; MG/5ML; MG/5ML
5 SYRUP ORAL 4 TIMES DAILY PRN
Qty: 473 ML | Refills: 2 | Status: SHIPPED | OUTPATIENT
Start: 2025-07-28

## 2025-07-28 RX ORDER — ZOLPIDEM TARTRATE 6.25 MG/1
6.25 TABLET, FILM COATED, EXTENDED RELEASE ORAL NIGHTLY PRN
Qty: 90 TABLET | Refills: 0 | Status: SHIPPED | OUTPATIENT
Start: 2025-07-28

## 2025-07-28 RX ORDER — GABAPENTIN 100 MG/1
200 CAPSULE ORAL NIGHTLY
Qty: 180 CAPSULE | Refills: 1 | Status: SHIPPED | OUTPATIENT
Start: 2025-07-28

## 2025-07-28 RX ORDER — LEVALBUTEROL INHALATION SOLUTION 1.25 MG/3ML
1 SOLUTION RESPIRATORY (INHALATION) EVERY 6 HOURS PRN
Qty: 90 EACH | Refills: 2 | Status: SHIPPED | OUTPATIENT
Start: 2025-07-28

## 2025-07-28 RX ORDER — FAMOTIDINE 20 MG/1
20 TABLET, FILM COATED ORAL DAILY
Qty: 90 TABLET | Refills: 1 | Status: SHIPPED | OUTPATIENT
Start: 2025-07-28 | End: 2025-07-28 | Stop reason: SDUPTHER

## 2025-07-28 RX ORDER — FAMOTIDINE 20 MG/1
20 TABLET, FILM COATED ORAL DAILY
Qty: 90 TABLET | Refills: 1 | Status: SHIPPED | OUTPATIENT
Start: 2025-07-28

## 2025-07-28 NOTE — PATIENT INSTRUCTIONS
Insomnia  Insomnia is a sleep disorder that makes it difficult to fall asleep or stay asleep. Insomnia can cause fatigue, low energy, difficulty concentrating, mood swings, and poor performance at work or school.  There are three different ways to classify insomnia:  Difficulty falling asleep.  Difficulty staying asleep.  Waking up too early in the morning.  Any type of insomnia can be long-term (chronic) or short-term (acute). Both are common. Short-term insomnia usually lasts for 3 months or less. Chronic insomnia occurs at least three times a week for longer than 3 months.  What are the causes?  Insomnia may be caused by another condition, situation, or substance, such as:  Having certain mental health conditions, such as anxiety and depression.  Using caffeine, alcohol, tobacco, or drugs.  Having gastrointestinal conditions, such as gastroesophageal reflux disease (GERD).  Having certain medical conditions. These include:  Asthma.  Alzheimer's disease.  Stroke.  Chronic pain.  An overactive thyroid gland (hyperthyroidism).  Other sleep disorders, such as restless legs syndrome and sleep apnea.  Menopause.  Sometimes, the cause of insomnia may not be known.  What increases the risk?  Risk factors for insomnia include:  Gender. Females are affected more often than males.  Age. Insomnia is more common as people get older.  Stress and certain medical and mental health conditions.  Lack of exercise.  Having an irregular work schedule. This may include working night shifts and traveling between different time zones.  What are the signs or symptoms?  If you have insomnia, the main symptom is having trouble falling asleep or having trouble staying asleep. This may lead to other symptoms, such as:  Feeling tired or having low energy.  Feeling nervous about going to sleep.  Not feeling rested in the morning.  Having trouble concentrating.  Feeling irritable, anxious, or depressed.  How is this diagnosed?  This condition  may be diagnosed based on:  Your symptoms and medical history. Your health care provider may ask about:  Your sleep habits.  Any medical conditions you have.  Your mental health.  A physical exam.  How is this treated?  Treatment for insomnia depends on the cause. Treatment may focus on treating an underlying condition that is causing the insomnia. Treatment may also include:  Medicines to help you sleep.  Counseling or therapy.  Lifestyle adjustments to help you sleep better.  Follow these instructions at home:  Eating and drinking    Limit or avoid alcohol, caffeinated beverages, and products that contain nicotine and tobacco, especially close to bedtime. These can disrupt your sleep.  Do not eat a large meal or eat spicy foods right before bedtime. This can lead to digestive discomfort that can make it hard for you to sleep.  Sleep habits    Keep a sleep diary to help you and your health care provider figure out what could be causing your insomnia. Write down:  When you sleep.  When you wake up during the night.  How well you sleep and how rested you feel the next day.  Any side effects of medicines you are taking.  What you eat and drink.  Make your bedroom a dark, comfortable place where it is easy to fall asleep.  Put up shades or blackout curtains to block light from outside.  Use a white noise machine to block noise.  Keep the temperature cool.  Limit screen use before bedtime. This includes:  Not watching TV.  Not using your smartphone, tablet, or computer.  Stick to a routine that includes going to bed and waking up at the same times every day and night. This can help you fall asleep faster. Consider making a quiet activity, such as reading, part of your nighttime routine.  Try to avoid taking naps during the day so that you sleep better at night.  Get out of bed if you are still awake after 15 minutes of trying to sleep. Keep the lights down, but try reading or doing a quiet activity. When you feel  sleepy, go back to bed.  General instructions  Take over-the-counter and prescription medicines only as told by your health care provider.  Exercise regularly as told by your health care provider. However, avoid exercising in the hours right before bedtime.  Use relaxation techniques to manage stress. Ask your health care provider to suggest some techniques that may work well for you. These may include:  Breathing exercises.  Routines to release muscle tension.  Visualizing peaceful scenes.  Make sure that you drive carefully. Do not drive if you feel very sleepy.  Keep all follow-up visits. This is important.  Contact a health care provider if:  You are tired throughout the day.  You have trouble in your daily routine due to sleepiness.  You continue to have sleep problems, or your sleep problems get worse.  Get help right away if:  You have thoughts about hurting yourself or someone else.  Get help right away if you feel like you may hurt yourself or others, or have thoughts about taking your own life. Go to your nearest emergency room or:  Call 911.  Call the National Suicide Prevention Lifeline at 1-401.222.4403 or 038. This is open 24 hours a day.  Text the Crisis Text Line at 181471.  Summary  Insomnia is a sleep disorder that makes it difficult to fall asleep or stay asleep.  Insomnia can be long-term (chronic) or short-term (acute).  Treatment for insomnia depends on the cause. Treatment may focus on treating an underlying condition that is causing the insomnia.  Keep a sleep diary to help you and your health care provider figure out what could be causing your insomnia.  This information is not intended to replace advice given to you by your health care provider. Make sure you discuss any questions you have with your health care provider.  Document Revised: 11/28/2022 Document Reviewed: 11/28/2022  Elsevier Patient Education © 2024 [employer]

## 2025-07-28 NOTE — PROGRESS NOTES
Chief Complaint  Med Refill, Hypertension, Insomnia, and Anemia    Subjective          Jeimy Willard is a 70 y.o. female who presents to [facility]    History of Present Illness  History of Present Illness  The patient presents for a routine follow-up.    She reports satisfactory sleep with Ambien, averaging 7 hours per night, provided she does not experience coughing episodes. She does not experience any side effects from the medication upon waking. She is currently on a regimen of gabapentin, taking two capsules nightly, which she finds beneficial.    She continues to be under the care of a pulmonologist for her chronic lung condition and is currently on tobramycin. She has been experiencing a persistent cough, which led to a sputum culture revealing Pseudomonas. A bronchoscopy was performed by her pulmonologist, and she was subsequently started on tobramycin via nebulizer. She describes the medication as having an unpleasant taste and a viscous consistency that adheres to her teeth and mouth. She is seeking a refill of her cough syrup as her coughing tends to worsen in the late afternoon and at bedtime.    She is also on Pepcid 20 mg, which she finds effective in managing her reflux, although she occasionally experiences breakthrough symptoms, particularly when she eats late or consumes cucumbers.    She has been undergoing extensive lab work due to renal function issues related to her creatinine levels. She is currently receiving iron infusions, with four more sessions remaining. She reports feeling less fatigued in the afternoons since starting the infusions. She has undergone numerous CAT scans, chest x-rays, and CT scans.    Sleep: She reports sleeping 7 hours per night.      Little interest or pleasure in doing things? Not at all   Feeling down, depressed, or hopeless? Not at all   PHQ-2 Total Score 0                Health Maintenance Due   Topic Date Due    COVID-19 Vaccine  (5 - 2024-25 season) 09/01/2024    ANNUAL WELLNESS VISIT  08/01/2025        Review of Systems   Constitutional:  Negative for chills, fatigue and fever.   Respiratory:  Positive for cough. Negative for shortness of breath.    Cardiovascular:  Negative for chest pain and palpitations.   Gastrointestinal:  Negative for constipation, diarrhea, nausea and vomiting.   Musculoskeletal:  Negative for back pain and neck pain.   Skin:  Negative for rash.   Neurological:  Negative for dizziness and headaches.   Psychiatric/Behavioral:  Positive for sleep disturbance. Negative for self-injury and suicidal ideas.           Medical History: has a past medical history of Allergic (2022), Allergic rhinitis, Anemia, Arthritis, Arthritis of neck (Years ago), Asthma (2023), Cancer, Cervical disc disorder (Years ago), Cholelithiasis (2000), Colon polyp, CTS (carpal tunnel syndrome) (Years ago), GERD (gastroesophageal reflux disease), History of transfusion, Hypertension, Injury of back (Sept 2023), Insomnia, Knee swelling, Lumbosacral disc disease (Years ago), Lymphoma (1999), Neuroma of foot (Years ago), Osteoporosis, Seasonal allergies, Sinusitis, Stomach cancer (2005), Stomach disorder, Ulcer (traumatic) of oral mucosa, and Vitamin D deficiency.     Surgical History: has a past surgical history that includes Cholecystectomy; Colonoscopy (02/20/2019); Esophagogastroduodenoscopy (03/13/2020); Gallbladder surgery; Laparoscopic total gastrectomy; Replacement total knee (Left, 2020); Esophagogastroduodenoscopy (N/A, 10/16/2024); Colonoscopy (N/A, 10/16/2024); and Joint replacement (Nov 2020).     Family History: family history includes Alzheimer's disease in her maternal grandfather; Arthritis in her mother; Breast cancer in her mother; Cancer in her brother and mother; Diabetes in her brother, father, and mother; Esophageal cancer in her paternal grandmother; Heart disease in her brother and mother; Heart failure in her mother;  Hypertension in her brother and mother; Kidney disease in her brother and mother; Kidney failure in her mother; Lung cancer in her maternal grandmother; Miscarriages / Stillbirths in her mother and sister; Stroke in her mother.     Social History: reports that she has never smoked. She has never been exposed to tobacco smoke. She has never used smokeless tobacco. She reports that she does not drink alcohol and does not use drugs.    Allergies: Heparin      Current Outpatient Medications:     acetaminophen (TYLENOL) 325 MG tablet, Take 2 tablets by mouth Every 6 (Six) Hours As Needed for Mild Pain., Disp: 360 tablet, Rfl: 0    azelastine (ASTELIN) 0.1 % nasal spray, , Disp: , Rfl:     brompheniramine-pseudoephedrine-DM 30-2-10 MG/5ML syrup, Take 5 mL by mouth 4 (Four) Times a Day As Needed for Allergies., Disp: 473 mL, Rfl: 2    Calcium Carbonate-Vitamin D 600-10 MG-MCG per tablet, Take 1 tablet by mouth 2 (Two) Times a Day., Disp: 180 tablet, Rfl: 1    cetirizine (zyrTEC) 10 MG tablet, , Disp: , Rfl:     cyanocobalamin 1000 MCG/ML injection, , Disp: , Rfl:     Diclofenac Sodium (VOLTAREN) 1 % gel gel, Apply 2 g topically to the appropriate area as directed 4 (Four) Times a Day As Needed (knee pain)., Disp: 100 g, Rfl: 1    diphenhydrAMINE (BENADRYL) 25 mg capsule, Take 1 capsule by mouth Every 6 (Six) Hours As Needed., Disp: , Rfl:     DULoxetine (CYMBALTA) 30 MG capsule, Take 1 capsule by mouth 2 (Two) Times a Day., Disp: 60 capsule, Rfl: 2    EPINEPHrine (EPIPEN) 0.3 MG/0.3ML solution auto-injector injection, , Disp: , Rfl:     Estrogens Conjugated (Premarin) 0.625 MG/GM vaginal cream, Insert 2 g into the vagina As Needed (Vaginal irritation)., Disp: 30 g, Rfl: 2    estrogens, conjugated, (Premarin) 0.3 MG tablet, Take 1 tablet by mouth 2 (Two) Times a Week., Disp: , Rfl:     famotidine (PEPCID) 20 MG tablet, Take 1 tablet by mouth Daily., Disp: 90 tablet, Rfl: 1    fluticasone (FLONASE) 50 MCG/ACT nasal spray, ,  Disp: , Rfl:     folic acid (FOLVITE) 1 MG tablet, Take 1 tablet by mouth Daily., Disp: 90 tablet, Rfl: 1    gabapentin (NEURONTIN) 100 MG capsule, Take 2 capsules by mouth Every Night., Disp: 180 capsule, Rfl: 1    hydrOXYzine (ATARAX) 25 MG tablet, Take 1 tablet by mouth Every 6 (Six) Hours As Needed for Itching., Disp: 30 tablet, Rfl: 1    Iron-Vitamin C (Vitron-C)  MG tablet, , Disp: , Rfl:     levalbuterol (Xopenex) 1.25 MG/3ML nebulizer solution, Take 1 ampule by nebulization Every 6 (Six) Hours As Needed for Wheezing., Disp: 90 each, Rfl: 2    levothyroxine (SYNTHROID, LEVOTHROID) 75 MCG tablet, TAKE 1 TABLET BY MOUTH EVERY MORNING, Disp: 90 tablet, Rfl: 1    lisinopril (PRINIVIL,ZESTRIL) 10 MG tablet, Take 0.5 tablets by mouth Daily., Disp: , Rfl:     multivitamins-minerals (PRESERVISION AREDS 2) capsule capsule, Take 1 capsule by mouth 2 (Two) Times a Day., Disp: 180 capsule, Rfl: 1    olopatadine (PATANOL) 0.1 % ophthalmic solution, 1 drop., Disp: , Rfl:     pantoprazole (PROTONIX) 40 MG EC tablet, Take 1 tablet by mouth Daily., Disp: 90 tablet, Rfl: 1    predniSONE (DELTASONE) 10 MG tablet, , Disp: , Rfl:     Pyridoxine HCl (VITAMIN B-6 PO), , Disp: , Rfl:     Singulair 10 MG tablet, Take 1 tablet by mouth Daily., Disp: , Rfl:     tobramycin PF (David) 300 MG/5ML nebulizer solution, Take 5 mL by nebulization 2 (Two) Times a Day. Use daily for 1 month on, one month off., Disp: 50 mL, Rfl: 5    zolpidem CR (AMBIEN CR) 6.25 MG CR tablet, Take 1 tablet by mouth At Night As Needed for Sleep., Disp: 90 tablet, Rfl: 0  No current facility-administered medications for this visit.      Immunization History   Administered Date(s) Administered    ABRYSVO (RSV, 60+ or pregnant women 32-36 wks) 08/05/2024    COVID-19 (PFIZER) Purple Cap Monovalent 03/10/2021, 03/31/2021, 10/22/2021    Covid-19 (Pfizer) Gray Cap Monovalent 04/16/2022    Flu Vaccine Quad PF >36MO 09/26/2020    FluMist 2-49yrs 09/26/2020    Fluzone  "(or Fluarix & Flulaval for VFC) >6mos 09/17/2021    Fluzone High-Dose 65+YRS 09/06/2024    Fluzone High-Dose 65+yrs 10/04/2023    Fluzone Quad >6mos (Multi-dose) 09/26/2020    Pneumococcal Conjugate 20-Valent (PCV20) 11/23/2022    Shingrix 12/19/2021, 03/11/2022         Objective       Vitals:    07/28/25 0745   BP: 124/82   BP Location: Left arm   Pulse: 82   Temp: 97.4 °F (36.3 °C)   TempSrc: Temporal   SpO2: 95%   Weight: 77.2 kg (170 lb 4.8 oz)   Height: 157.5 cm (62\")      Body mass index is 31.15 kg/m².   Wt Readings from Last 3 Encounters:   07/28/25 77.2 kg (170 lb 4.8 oz)   04/07/25 83.9 kg (185 lb)   02/03/25 84.6 kg (186 lb 9.6 oz)      BP Readings from Last 3 Encounters:   07/28/25 124/82   04/07/25 158/95   02/03/25 130/82             Physical Exam  Vitals reviewed.   Constitutional:       Appearance: Normal appearance. She is well-developed.   HENT:      Head: Normocephalic and atraumatic.   Eyes:      Conjunctiva/sclera: Conjunctivae normal.      Pupils: Pupils are equal, round, and reactive to light.   Cardiovascular:      Rate and Rhythm: Normal rate and regular rhythm.      Heart sounds: Normal heart sounds. No murmur heard.  Pulmonary:      Effort: Pulmonary effort is normal.      Breath sounds: Normal breath sounds. No wheezing or rhonchi.   Abdominal:      General: Bowel sounds are normal. There is no distension.      Palpations: Abdomen is soft.      Tenderness: There is no abdominal tenderness.   Skin:     General: Skin is warm and dry.   Neurological:      Mental Status: She is alert and oriented to person, place, and time.   Psychiatric:         Mood and Affect: Mood and affect normal.         Behavior: Behavior normal.         Thought Content: Thought content normal.         Judgment: Judgment normal.       Physical Exam        Result Review :   Results  Labs   - Sputum culture: Pseudomonas         Common labs          11/13/2024    13:09 3/10/2025    08:59 7/3/2025    07:40   Common Labs "   Glucose   74    BUN   19.0    Creatinine 1.80   1.81    Sodium   139    Potassium   4.4    Chloride   102    Calcium   9.3    Albumin   3.5    Total Bilirubin   0.3    Alkaline Phosphatase   109    AST (SGOT)   27    ALT (SGPT)   18    WBC  9.48  10.84    Hemoglobin  11.4  9.2    Hematocrit  36.4  31.3    Platelets  353  548    Microalbumin, Urine   1.9                     Assessment and Plan        Diagnoses and all orders for this visit:    1. Gastroesophageal reflux disease without esophagitis (Primary)  -     famotidine (PEPCID) 20 MG tablet; Take 1 tablet by mouth Daily.  Dispense: 90 tablet; Refill: 1    2. Subacute cough  -     brompheniramine-pseudoephedrine-DM 30-2-10 MG/5ML syrup; Take 5 mL by mouth 4 (Four) Times a Day As Needed for Allergies.  Dispense: 473 mL; Refill: 2    3. Bronchiectasis with acute lower respiratory infection  Comments:  albuterol bid x 5 days and prn  Orders:  -     brompheniramine-pseudoephedrine-DM 30-2-10 MG/5ML syrup; Take 5 mL by mouth 4 (Four) Times a Day As Needed for Allergies.  Dispense: 473 mL; Refill: 2  -     levalbuterol (Xopenex) 1.25 MG/3ML nebulizer solution; Take 1 ampule by nebulization Every 6 (Six) Hours As Needed for Wheezing.  Dispense: 90 each; Refill: 2    4. Post herpetic neuralgia  -     gabapentin (NEURONTIN) 100 MG capsule; Take 2 capsules by mouth Every Night.  Dispense: 180 capsule; Refill: 1    5. Acute left-sided low back pain with left-sided sciatica  -     gabapentin (NEURONTIN) 100 MG capsule; Take 2 capsules by mouth Every Night.  Dispense: 180 capsule; Refill: 1    6. Primary insomnia  -     zolpidem CR (AMBIEN CR) 6.25 MG CR tablet; Take 1 tablet by mouth At Night As Needed for Sleep.  Dispense: 90 tablet; Refill: 0    7. Encounter for medication monitoring  -     POC Medline 12 Panel Urine Drug Screen    8. Screening for lipid disorders  -     Lipid Panel    9. Screening for thyroid disorder  -     TSH    Other orders  -     Discontinue:  famotidine (PEPCID) 20 MG tablet; Take 1 tablet by mouth Daily.  Dispense: 90 tablet; Refill: 1        Assessment & Plan  1. Insomnia.  - Reports sleeping approximately 7 hours per night with the use of Ambien, without any side effects.  - No medication side effects upon waking.  - Ambien prescription will be sent to pharmacy.    2. Pseudomonas infection.  - Currently on tobramycin via nebulizer for a Pseudomonas infection in her lungs.  - Reports the medication has a nasty taste and sticks to her teeth and mouth.  - Continues with tobramycin as prescribed by pulmonologist.    3. Gastroesophageal Reflux Disease (GERD).  - Taking Pepcid 20 mg, which helps control reflux symptoms.  - Occasionally experiences breakthrough symptoms if eating late or consuming certain foods like cucumbers.  - Pepcid 20 mg prescription will be sent to pharmacy.    4. Sciatica/Post herpetic neuralgia.  - Takes gabapentin at night, which helps her neuropathic pain.  - Gabapentin prescription will be changed to a 180-day supply with a refill, sent to pharmacy.    5. Health Maintenance.  - Last lipid panel was conducted a year ago.  - Urine drug screen will be performed today.  - Routine labs, including CMP, thyroid, and lipid panel, will be ordered.  - Mammogram is scheduled for mid-October.    Obtained a written consent for LEESA query. Discussed the risks and benefits of the use of controlled substances with the patient, including the risk of tolerance and drug dependence.  The patient has been counseled on the need to have an exit strategy, including potentially discontinuing the use of controlled substances.  LEESA has or will be reviewed as soon as it becomes available.    Patient was given instructions and counseling regarding her condition or for health maintenance advice. Please see specific information pulled into the AVS if appropriate.     GUNJAN Malloy    Patient or patient representative verbalized consent for the use  of Ambient Listening during the visit with  GUNJAN Malloy for chart documentation. 7/28/2025  08:04 EDT

## 2025-07-31 ENCOUNTER — LAB (OUTPATIENT)
Facility: HOSPITAL | Age: 70
End: 2025-07-31
Payer: MEDICARE

## 2025-07-31 LAB
CHOLEST SERPL-MCNC: 163 MG/DL (ref 0–200)
HDLC SERPL-MCNC: 73 MG/DL (ref 40–60)
LDLC SERPL CALC-MCNC: 79 MG/DL (ref 0–100)
LDLC/HDLC SERPL: 1.08 {RATIO}
TRIGL SERPL-MCNC: 56 MG/DL (ref 0–150)
TSH SERPL DL<=0.05 MIU/L-ACNC: 0.95 UIU/ML (ref 0.27–4.2)
VLDLC SERPL-MCNC: 11 MG/DL (ref 5–40)

## 2025-07-31 PROCEDURE — 84443 ASSAY THYROID STIM HORMONE: CPT | Performed by: NURSE PRACTITIONER

## 2025-07-31 PROCEDURE — 80061 LIPID PANEL: CPT | Performed by: NURSE PRACTITIONER

## 2025-08-13 ENCOUNTER — OFFICE VISIT (OUTPATIENT)
Dept: FAMILY MEDICINE CLINIC | Facility: CLINIC | Age: 70
End: 2025-08-13
Payer: MEDICARE

## 2025-08-13 VITALS
SYSTOLIC BLOOD PRESSURE: 111 MMHG | WEIGHT: 172.8 LBS | DIASTOLIC BLOOD PRESSURE: 69 MMHG | TEMPERATURE: 97.6 F | HEART RATE: 87 BPM | BODY MASS INDEX: 31.8 KG/M2 | HEIGHT: 62 IN

## 2025-08-13 DIAGNOSIS — D64.9 ANEMIA, UNSPECIFIED TYPE: ICD-10-CM

## 2025-08-13 DIAGNOSIS — F51.01 PRIMARY INSOMNIA: ICD-10-CM

## 2025-08-13 DIAGNOSIS — E03.9 HYPOTHYROIDISM, UNSPECIFIED TYPE: ICD-10-CM

## 2025-08-13 DIAGNOSIS — I10 PRIMARY HYPERTENSION: ICD-10-CM

## 2025-08-13 DIAGNOSIS — N18.32 CHRONIC RENAL IMPAIRMENT, STAGE 3B: ICD-10-CM

## 2025-08-13 DIAGNOSIS — B02.29 POST HERPETIC NEURALGIA: ICD-10-CM

## 2025-08-13 DIAGNOSIS — Z00.00 MEDICARE ANNUAL WELLNESS VISIT, SUBSEQUENT: Primary | ICD-10-CM

## 2025-08-13 DIAGNOSIS — Z91.81 AT HIGH RISK FOR FALLS: ICD-10-CM

## 2025-08-13 DIAGNOSIS — Z13.220 SCREENING FOR LIPID DISORDERS: ICD-10-CM

## 2025-08-23 DIAGNOSIS — B02.29 POST HERPETIC NEURALGIA: ICD-10-CM

## 2025-08-23 DIAGNOSIS — M54.42 ACUTE LEFT-SIDED LOW BACK PAIN WITH LEFT-SIDED SCIATICA: ICD-10-CM

## 2025-08-25 RX ORDER — DULOXETIN HYDROCHLORIDE 30 MG/1
30 CAPSULE, DELAYED RELEASE ORAL 2 TIMES DAILY
Qty: 60 CAPSULE | Refills: 2 | Status: SHIPPED | OUTPATIENT
Start: 2025-08-25

## (undated) DEVICE — Device: Brand: DEFENDO AIR/WATER/SUCTION AND BIOPSY VALVE

## (undated) DEVICE — BLCK/BITE BLOX WO/DENTL/RIM W/STRAP 54F

## (undated) DEVICE — LINER SURG CANSTR SXN S/RIGD 1500CC

## (undated) DEVICE — CONN JET HYDRA H20 AUXILIARY DISP

## (undated) DEVICE — SOLIDIFIER LIQLOC PLS 1500CC BT

## (undated) DEVICE — Device

## (undated) DEVICE — SINGLE-USE BIOPSY FORCEPS: Brand: RADIAL JAW 4

## (undated) DEVICE — SOL IRRG H2O PL/BG 1000ML STRL